# Patient Record
Sex: MALE | Race: WHITE | Employment: FULL TIME | URBAN - METROPOLITAN AREA
[De-identification: names, ages, dates, MRNs, and addresses within clinical notes are randomized per-mention and may not be internally consistent; named-entity substitution may affect disease eponyms.]

---

## 2017-01-19 ENCOUNTER — GENERIC CONVERSION - ENCOUNTER (OUTPATIENT)
Dept: OTHER | Facility: OTHER | Age: 62
End: 2017-01-19

## 2017-06-20 ENCOUNTER — ALLSCRIPTS OFFICE VISIT (OUTPATIENT)
Dept: OTHER | Facility: OTHER | Age: 62
End: 2017-06-20

## 2017-06-20 DIAGNOSIS — I10 ESSENTIAL (PRIMARY) HYPERTENSION: ICD-10-CM

## 2017-09-08 ENCOUNTER — GENERIC CONVERSION - ENCOUNTER (OUTPATIENT)
Dept: OTHER | Facility: OTHER | Age: 62
End: 2017-09-08

## 2017-11-06 ENCOUNTER — GENERIC CONVERSION - ENCOUNTER (OUTPATIENT)
Dept: OTHER | Facility: OTHER | Age: 62
End: 2017-11-06

## 2017-11-06 LAB
A/G RATIO (HISTORICAL): 2 (ref 1.2–2.2)
ALBUMIN SERPL BCP-MCNC: 4.3 G/DL (ref 3.6–4.8)
ALP SERPL-CCNC: 45 IU/L (ref 39–117)
ALT SERPL W P-5'-P-CCNC: 27 IU/L (ref 0–44)
AST SERPL W P-5'-P-CCNC: 22 IU/L (ref 0–40)
BASOPHILS # BLD AUTO: 0 %
BASOPHILS # BLD AUTO: 0 X10E3/UL (ref 0–0.2)
BILIRUB SERPL-MCNC: 0.2 MG/DL (ref 0–1.2)
BUN SERPL-MCNC: 14 MG/DL (ref 8–27)
BUN/CREA RATIO (HISTORICAL): 16 (ref 10–24)
CALCIUM SERPL-MCNC: 8.8 MG/DL (ref 8.6–10.2)
CHLORIDE SERPL-SCNC: 101 MMOL/L (ref 96–106)
CO2 SERPL-SCNC: 25 MMOL/L (ref 18–29)
CREAT SERPL-MCNC: 0.86 MG/DL (ref 0.76–1.27)
DEPRECATED RDW RBC AUTO: 13.7 % (ref 12.3–15.4)
EGFR AFRICAN AMERICAN (HISTORICAL): 107 ML/MIN/1.73
EGFR-AMERICAN CALC (HISTORICAL): 92 ML/MIN/1.73
EOSINOPHIL # BLD AUTO: 0.1 X10E3/UL (ref 0–0.4)
EOSINOPHIL # BLD AUTO: 2 %
GLUCOSE SERPL-MCNC: 104 MG/DL (ref 65–99)
HCT VFR BLD AUTO: 42.6 % (ref 37.5–51)
HGB BLD-MCNC: 14.5 G/DL (ref 12.6–17.7)
IMM.GRANULOCYTES (CD4/8) (HISTORICAL): 0 %
IMM.GRANULOCYTES (CD4/8) (HISTORICAL): 0 X10E3/UL (ref 0–0.1)
LYMPHOCYTES # BLD AUTO: 2.3 X10E3/UL (ref 0.7–3.1)
LYMPHOCYTES # BLD AUTO: 36 %
MCH RBC QN AUTO: 31.9 PG (ref 26.6–33)
MCHC RBC AUTO-ENTMCNC: 34 G/DL (ref 31.5–35.7)
MCV RBC AUTO: 94 FL (ref 79–97)
MONOCYTES # BLD AUTO: 0.7 X10E3/UL (ref 0.1–0.9)
MONOCYTES (HISTORICAL): 11 %
NEUTROPHILS # BLD AUTO: 3.2 X10E3/UL (ref 1.4–7)
NEUTROPHILS # BLD AUTO: 51 %
PLATELET # BLD AUTO: 234 X10E3/UL (ref 150–379)
POTASSIUM SERPL-SCNC: 4.8 MMOL/L (ref 3.5–5.2)
RBC (HISTORICAL): 4.54 X10E6/UL (ref 4.14–5.8)
SODIUM SERPL-SCNC: 141 MMOL/L (ref 134–144)
TOT. GLOBULIN, SERUM (HISTORICAL): 2.1 G/DL (ref 1.5–4.5)
TOTAL PROTEIN (HISTORICAL): 6.4 G/DL (ref 6–8.5)
WBC # BLD AUTO: 6.3 X10E3/UL (ref 3.4–10.8)

## 2017-11-07 LAB
BACTERIA UR QL AUTO: NORMAL
BILIRUB UR QL STRIP: NEGATIVE
CHOLEST SERPL-MCNC: 224 MG/DL (ref 100–199)
CHOLEST/HDLC SERPL: 6.4 RATIO UNITS (ref 0–5)
COLOR UR: YELLOW
COMMENT (HISTORICAL): CLEAR
FECAL OCCULT BLOOD DIAGNOSTIC (HISTORICAL): NEGATIVE
GLUCOSE (HISTORICAL): NEGATIVE
HDLC SERPL-MCNC: 35 MG/DL
KETONES UR STRIP-MCNC: NEGATIVE MG/DL
LDLC SERPL CALC-MCNC: 161 MG/DL (ref 0–99)
LEUKOCYTE ESTERASE UR QL STRIP: NEGATIVE
MICROSCOPIC EXAMINATION (HISTORICAL): NORMAL
MICROSCOPIC EXAMINATION (HISTORICAL): NORMAL
MUCUS THREADS (HISTORICAL): PRESENT
NITRITE UR QL STRIP: NEGATIVE
NON-SQ EPI CELLS URNS QL MICRO: NORMAL /HPF
PH UR STRIP.AUTO: 5 [PH] (ref 5–7.5)
PROT UR STRIP-MCNC: NEGATIVE MG/DL
RBC (HISTORICAL): NORMAL /HPF
SP GR UR STRIP.AUTO: 1.02 (ref 1–1.03)
TRIGL SERPL-MCNC: 140 MG/DL (ref 0–149)
TSH SERPL DL<=0.05 MIU/L-ACNC: 1.21 UIU/ML (ref 0.45–4.5)
URINALYSIS (UA) (HISTORICAL): NORMAL
UROBILINOGEN UR QL STRIP.AUTO: 0.2 EU/DL (ref 0.2–1)
VLDLC SERPL CALC-MCNC: 28 MG/DL (ref 5–40)
WBC # BLD AUTO: NORMAL /HPF

## 2017-12-11 ENCOUNTER — ALLSCRIPTS OFFICE VISIT (OUTPATIENT)
Dept: OTHER | Facility: OTHER | Age: 62
End: 2017-12-11

## 2017-12-12 NOTE — PROGRESS NOTES
Assessment    1  Essential hypertension (401 9) (I10)    Plan  Essential hypertension    · Lisinopril 40 MG Oral Tablet; TAKE 1 TABLET DAILY   · (1) COMPREHENSIVE METABOLIC PANEL; Status:Active; Requested JEP:[id number];    · (1) LDL,DIRECT; Status:Active; Requested ANI:[id number];    · (1) LIPID PANEL, FASTING; Status:Active; Requested [id number]:[id number];    · Follow-up visit in 6 months Evaluation and Treatment  Follow-up  Status: Complete Done: [id number]    Discussion/Summary    The patient's blood pressure stable on his current medication  We have made no changes today  His cholesterol was elevated and he admits to not following his diet  He is going to work on cutting back on the saturated fats in decreasing his portion sizes as well as eating more fruits and vegetables  We will plan on seeing him back as scheduled in 6 months and he will get the lab work done prior to that visit  We will see him back as scheduled  Possible side effects of new medications were reviewed with the patient/guardian today  The treatment plan was reviewed with the patient/guardian  The patient/guardian understands and agrees with the treatment plan      Chief Complaint  Check up Hypertension  Patient is here today for follow up of chronic conditions described in HPI  History of Present Illness  Oliver Ro is 58-year-old male who presents today for follow-up of his hypertension  He is feeling well  The patient denies any chest pain, shortness of breath, or palpitations  There is no edema  There are no headaches or visual changes  There is no lightheadedness, dizziness, or fainting spells  did not take his medication today  are no GI problems  diet has not changed, he is not exercising  is no chest pain or shortness of breath with exertion  The patient presents for follow-up of essential hypertension  The patient states he has been stable with his blood pressure control since the last visit  He has no comorbid illnesses   He has no significant interval events  Symptoms: denies impaired vision,-- denies dyspnea,-- denies chest pain,-- denies intermittent leg claudication,-- denies lower extremity edema-- and-- denies   Associated symptoms include no headache,-- no focal neurologic deficits-- and-- no memory loss  Home monitoring: The patient checks his blood pressure regularly  Blood pressure control has been good  Medications: the patient is adherent with his medication regimen  -- He denies medication side effects  Review of Systems   Constitutional: No fever or chills, feels well, no tiredness, no recent weight gain or weight loss  Eyes: No complaints of eye pain, no red eyes, no discharge from eyes, no itchy eyes  ENT: no complaints of earache, no hearing loss, no nosebleeds, no nasal discharge, no sore throat, no hoarseness  Cardiovascular: No complaints of slow heart rate, no fast heart rate, no chest pain, no palpitations, no leg claudication, no lower extremity  Respiratory: No complaints of shortness of breath, no wheezing, no cough, no SOB on exertion, no orthopnea or PND  Gastrointestinal: No complaints of abdominal pain, no constipation, no nausea or vomiting, no diarrhea or bloody stools  Genitourinary: No complaints of dysuria, no incontinence, no hesitancy, no nocturia, no genital lesion, no testicular pain  Musculoskeletal: No complaints of arthralgia, no myalgias, no joint swelling or stiffness, no limb pain or swelling  Integumentary: No complaints of skin rash or skin lesions, no itching, no skin wound, no dry skin  Neurological: No compliants of headache, no confusion, no convulsions, no numbness or tingling, no dizziness or fainting, no limb weakness, no difficulty walking  Psychiatric: Is not suicidal, no sleep disturbances, no anxiety or depression, no change in personality, no emotional problems    Endocrine: No complaints of proptosis, no hot flashes, no muscle weakness, no erectile dysfunction, no deepening of the voice, no feelings of weakness  Hematologic/Lymphatic: No complaints of swollen glands, no swollen glands in the neck, does not bleed easily, no easy bruising  Active Problems  1  Benign localized hyperplasia of prostate (600 20) (N40 0)   2  Essential hypertension (401 9) (I10)   3  Hyperlipidemia, unspecified (272 4) (E78 5)   4  Nicotine dependence (305 1) (F17 200)   5  Nummular dermatitis (692 9) (L30 0)    Past Medical History  1  History of Colon cancer screening (V76 51) (Z12 11)   2  History of Flu vaccine need (V04 81) (Z23)   3  History of Need for Tdap vaccination (V06 1) (Z23)   4  History of Screening for prostate cancer (V76 44) (Z12 5)   5  History of Vaccine for VZV (varicella-zoster virus) (V05 4) (Z23)    The active problems and past medical history were reviewed and updated today  Family History  Mother    1  Family history of hypertension (V17 49) (Z82 49)  Father    2  Family history of diabetes mellitus (V18 0) (Z83 3)   3  Family history of Parkinsonism (V17 2) (Z82 0)   4  No family history of mental disorder  Sister    5  Family history of CHD (coronary heart disease)   6  Family history of myocardial infarction (V17 3) (Z82 49)    The family history was reviewed and updated today  Social History     · Cigar smoker (305 1) (F17 290)   · History of Current Every Day Smoker (305 1)   · Denies alcohol use causing problems   · Marital History - Currently    · Sedentary Lifestyle (V69 0)   · Working Full Time  The social history was reviewed and updated today  The social history was reviewed and is unchanged  Current Meds   1  Lisinopril 40 MG Oral Tablet; TAKE 1 TABLET DAILY; Therapy: [id number] to (Evaluate:13Jan2018)  Requested for: 75NUT8855; Last Rx:[id number] Ordered    The medication list was reviewed and updated today  Allergies  1   No Known Drug Allergies    Vitals  Vital Signs    Recorded: 58QYK1820 10:45AM   Heart Rate 72   Systolic 140, RUE, Sitting   Diastolic 88, RUE, Sitting   Height 5 ft 7 in   Weight 179 lb    BMI Calculated 28 04   BSA Calculated 1 93       Physical Exam   Constitutional  General appearance: No acute distress, well appearing and well nourished  Eyes  Conjunctiva and lids: No swelling, erythema, or discharge  Pupils and irises: Equal, round and reactive to light  Ears, Nose, Mouth, and Throat  External inspection of ears and nose: Normal    Otoscopic examination: Tympanic membrance translucent with normal light reflex  Canals patent without erythema  Nasal mucosa, septum, and turbinates: Normal without edema or erythema  Oropharynx: Normal with no erythema, edema, exudate or lesions  Pulmonary  Respiratory effort: No increased work of breathing or signs of respiratory distress  Auscultation of lungs: Clear to auscultation, equal breath sounds bilaterally, no wheezes, no rales, no rhonci  Cardiovascular  Palpation of heart: Normal PMI, no thrills  Auscultation of heart: Normal rate and rhythm, normal S1 and S2, without murmurs  Examination of extremities for edema and/or varicosities: Normal    Carotid pulses: Normal    Abdomen  Abdomen: Non-tender, no masses  Liver and spleen: No hepatomegaly or splenomegaly  Lymphatic  Palpation of lymph nodes in neck: No lymphadenopathy  Musculoskeletal  Gait and station: Normal    Digits and nails: Normal without clubbing or cyanosis  Inspection/palpation of joints, bones, and muscles: Normal    Skin  Skin and subcutaneous tissue: Normal without rashes or lesions  Neurologic  Cranial nerves: Cranial nerves 2-12 intact  Reflexes: 2+ and symmetric  Sensation: No sensory loss     Psychiatric  Orientation to person, place and time: Normal    Mood and affect: Normal          Results/Data  (1) CBC/PLT/DIFF [id number] 09:03AM Mikaelagarrett Morenitaeliseo     Test Name Result Flag Reference   WBC 6 3 x10E3/uL  3 4-10 8   RBC 4 54 x10E6/uL  4 14-5 80   Hemoglobin 14 5 g/dL 12 6-17 7   Hematocrit 42 6 %  37 5-51 0   MCV 94 fL  79-97   MCH 31 9 pg  26 6-33 0   MCHC 34 0 g/dL  31 5-35 7   RDW 13 7 %  12 3-15 4   Platelets 630 T62B1/VS  150-379   Neutrophils 51 %  Not Estab  Lymphs 36 %  Not Estab  Monocytes 11 %  Not Estab  Eos 2 %  Not Estab  Basos 0 %  Not Estab  Neutrophils (Absolute) 3 2 x10E3/uL  1 4-7 0   Lymphs (Absolute) 2 3 x10E3/uL  0 7-3 1   Monocytes(Absolute) 0 7 x10E3/uL  0 1-0 9   Eos (Absolute) 0 1 x10E3/uL  0 0-0 4   Baso (Absolute) 0 0 x10E3/uL  0 0-0 2   Immature Granulocytes 0 %  Not Estab  Immature Grans (Abs) 0 0 x10E3/uL  0 0-0 1     (1) COMPREHENSIVE METABOLIC PANEL [id number] 63:14GM Lurlean Stage     Test Name Result Flag Reference   Glucose, Serum 104 mg/dL H 65-99   BUN 14 mg/dL  8-27   Creatinine, Serum 0 86 mg/dL  0 76-1 27   BUN/Creatinine Ratio 16  10-24   Sodium, Serum 141 mmol/L  134-144   Potassium, Serum 4 8 mmol/L  3 5-5 2   Chloride, Serum 101 mmol/L     Carbon Dioxide, Total 25 mmol/L  18-29   Calcium, Serum 8 8 mg/dL  8 6-10 2   Protein, Total, Serum 6 4 g/dL  6 0-8 5   Albumin, Serum 4 3 g/dL  3 6-4 8   Globulin, Total 2 1 g/dL  1 5-4 5   A/G Ratio 2 0  1 2-2 2   Bilirubin, Total 0 2 mg/dL  0 0-1 2   Alkaline Phosphatase, S 45 IU/L     AST (SGOT) 22 IU/L  0-40   ALT (SGPT) 27 IU/L  0-44   eGFR If NonAfricn Am 92 mL/min/1 73  >59   eGFR If Africn Am 107 mL/min/1 73  >59     (1) LIPID PANEL, FASTING [id number] 09:03AM Lurlean Stage     Test Name Result Flag Reference   Cholesterol, Total 224 mg/dL H 100-199   Triglycerides 140 mg/dL  0-149   HDL Cholesterol 35 mg/dL L >39   VLDL Cholesterol Roni 28 mg/dL  5-40   LDL Cholesterol Calc 161 mg/dL H 0-99   T  Chol/HDL Ratio 6 4 ratio units H 0 0-5 0     T  Chol/HDL Ratio                                                            Men  Women                                              1/2 Avg  Risk  3 4    3 3                                                  Avg Risk  5 0    4 4 2X Avg  Risk  9 6    7 1                                               3X Avg  Risk 23 4   11 0     (1) TSH [id number] 09:03AM Mao Pitts     Test Name Result Flag Reference   TSH 1 210 uIU/mL  0 450-4 500       Future Appointments    Date/Time Provider Specialty Site   06/11/2018 09:30 AM Jim Bingham DO Family Medicine [facility], [place], operated by [facility]       Signatures   Electronically signed by : Solange Wright DO; Dec 11 2017  4:55PM EST                       (Author)

## 2018-01-12 VITALS
BODY MASS INDEX: 27.31 KG/M2 | SYSTOLIC BLOOD PRESSURE: 132 MMHG | HEART RATE: 70 BPM | WEIGHT: 174 LBS | HEIGHT: 67 IN | DIASTOLIC BLOOD PRESSURE: 78 MMHG

## 2018-01-22 VITALS
SYSTOLIC BLOOD PRESSURE: 140 MMHG | DIASTOLIC BLOOD PRESSURE: 88 MMHG | WEIGHT: 179 LBS | BODY MASS INDEX: 28.09 KG/M2 | HEART RATE: 72 BPM | HEIGHT: 67 IN

## 2018-06-02 LAB
ALBUMIN SERPL-MCNC: 4.1 G/DL (ref 3.6–4.8)
ALBUMIN/GLOB SERPL: 2.1 {RATIO} (ref 1.2–2.2)
ALP SERPL-CCNC: 42 IU/L (ref 39–117)
ALT SERPL-CCNC: 39 IU/L (ref 0–44)
AST SERPL-CCNC: 41 IU/L (ref 0–40)
BILIRUB SERPL-MCNC: <0.2 MG/DL (ref 0–1.2)
BUN SERPL-MCNC: 15 MG/DL (ref 8–27)
BUN/CREAT SERPL: 19 (ref 10–24)
CALCIUM SERPL-MCNC: 8.7 MG/DL (ref 8.6–10.2)
CHLORIDE SERPL-SCNC: 100 MMOL/L (ref 96–106)
CHOLEST SERPL-MCNC: 157 MG/DL (ref 100–199)
CHOLEST/HDLC SERPL: 4.2 RATIO (ref 0–5)
CO2 SERPL-SCNC: 24 MMOL/L (ref 18–29)
CREAT SERPL-MCNC: 0.79 MG/DL (ref 0.76–1.27)
GLOBULIN SER-MCNC: 2 G/DL (ref 1.5–4.5)
GLUCOSE SERPL-MCNC: 109 MG/DL (ref 65–99)
HDLC SERPL-MCNC: 37 MG/DL
LDLC SERPL CALC-MCNC: 99 MG/DL (ref 0–99)
LDLC SERPL DIRECT ASSAY-MCNC: 98 MG/DL (ref 0–99)
POTASSIUM SERPL-SCNC: 4.6 MMOL/L (ref 3.5–5.2)
PROT SERPL-MCNC: 6.1 G/DL (ref 6–8.5)
SL AMB EGFR AFRICAN AMERICAN: 110 ML/MIN/1.73
SL AMB EGFR NON AFRICAN AMERICAN: 95 ML/MIN/1.73
SL AMB VLDL CHOLESTEROL CALC: 21 MG/DL (ref 5–40)
SODIUM SERPL-SCNC: 139 MMOL/L (ref 134–144)
TRIGL SERPL-MCNC: 103 MG/DL (ref 0–149)

## 2018-06-08 RX ORDER — LISINOPRIL 40 MG/1
TABLET ORAL
Qty: 90 TABLET | Refills: 1 | OUTPATIENT
Start: 2018-06-08

## 2018-06-11 ENCOUNTER — OFFICE VISIT (OUTPATIENT)
Dept: FAMILY MEDICINE CLINIC | Facility: CLINIC | Age: 63
End: 2018-06-11
Payer: COMMERCIAL

## 2018-06-11 VITALS
HEIGHT: 67 IN | DIASTOLIC BLOOD PRESSURE: 84 MMHG | HEART RATE: 64 BPM | BODY MASS INDEX: 28.56 KG/M2 | SYSTOLIC BLOOD PRESSURE: 140 MMHG | WEIGHT: 182 LBS

## 2018-06-11 DIAGNOSIS — E78.2 MIXED HYPERLIPIDEMIA: ICD-10-CM

## 2018-06-11 DIAGNOSIS — F17.290 OTHER TOBACCO PRODUCT NICOTINE DEPENDENCE, UNCOMPLICATED: ICD-10-CM

## 2018-06-11 DIAGNOSIS — I10 ESSENTIAL HYPERTENSION: Primary | ICD-10-CM

## 2018-06-11 PROCEDURE — 99213 OFFICE O/P EST LOW 20 MIN: CPT | Performed by: FAMILY MEDICINE

## 2018-06-11 PROCEDURE — 3008F BODY MASS INDEX DOCD: CPT | Performed by: FAMILY MEDICINE

## 2018-06-11 RX ORDER — LISINOPRIL 40 MG/1
1 TABLET ORAL DAILY
COMMUNITY
Start: 2011-09-28 | End: 2018-07-13 | Stop reason: SDUPTHER

## 2018-06-11 NOTE — PATIENT INSTRUCTIONS

## 2018-06-11 NOTE — PROGRESS NOTES
Assessment/Plan:  Essential hypertension  Hypertension-the patient will continue with his current medication  He does have some element of white coat syndrome  He is getting lower blood pressure readings at home  He will continue to monitor it and will purchase a blood pressure cuff  He will call with readings consistently greater than 140/90  He was again encouraged to quit smoking  We will increase his exercise  We will see him back in the office as scheduled  Hyperlipidemia-patient's lipids look good on his current lab work  He will continue with his diet and exercise  All of his other labs look normal      Diagnoses and all orders for this visit:    Essential hypertension    Mixed hyperlipidemia    Other tobacco product nicotine dependence, uncomplicated    Other orders  -     lisinopril (ZESTRIL) 40 mg tablet; Take 1 tablet by mouth daily       Return in about 3 months (around 9/11/2018)  Subjective:   Chief Complaint   Patient presents with    Follow-up     6 month checkup        Patient ID: Tamara Spring is a 57 y o  male presents today for follow-up of his hypertension  Tamara Spring is a 57 y o  male who presents today for checkup of his hypertension  He is not checking his BP  The patient denies any chest pain, shortness of breath, or palpitations  There is no edema  There are no headaches or visual changes  There is no lightheadedness, dizziness, or fainting spells  There are no headaches  He is watching his diet  There are no GI problems  He is watching his diet  Hypertension   This is a chronic problem  The current episode started more than 1 year ago  The problem is unchanged  The problem is controlled  Pertinent negatives include no anxiety, blurred vision, chest pain, headaches, malaise/fatigue, neck pain, orthopnea, palpitations, peripheral edema, PND, shortness of breath or sweats  Risk factors for coronary artery disease include dyslipidemia   Past treatments include ACE inhibitors  The current treatment provides moderate improvement  The following portions of the patient's history were reviewed and updated as appropriate: allergies, current medications, past family history, past medical history, past social history, past surgical history and problem list   Patient Active Problem List   Diagnosis    Benign localized hyperplasia of prostate    Essential hypertension    Hyperlipidemia, unspecified    Nicotine dependence    Nummular dermatitis     History reviewed  No pertinent past medical history  History reviewed  No pertinent surgical history  No Known Allergies  Family History   Problem Relation Age of Onset    Hypertension Mother     Diabetes Father     Parkinsonism Father     Heart disease Sister      coronary heart disease(CHD)    Heart attack Sister 62     myocardial infarction     Social History     Social History    Marital status:      Spouse name: N/A    Number of children: N/A    Years of education: N/A     Occupational History    Not on file  Social History Main Topics    Smoking status: Current Every Day Smoker     Types: Cigars    Smokeless tobacco: Never Used    Alcohol use No    Drug use: Unknown    Sexual activity: Not on file     Other Topics Concern    Not on file     Social History Narrative    Sendentary lifestyle    , per [employer]    Working full-time         No current outpatient prescriptions on file prior to visit  No current facility-administered medications on file prior to visit  Review of Systems   Constitutional: Negative  Negative for malaise/fatigue  HENT: Negative  Eyes: Negative  Negative for blurred vision  Respiratory: Negative  Negative for shortness of breath  Cardiovascular: Negative  Negative for chest pain, palpitations, orthopnea and PND  Gastrointestinal: Negative  Endocrine: Negative  Genitourinary: Negative  Musculoskeletal: Negative    Negative for neck pain  Skin: Negative  Allergic/Immunologic: Negative  Neurological: Negative  Negative for headaches  Hematological: Negative  Psychiatric/Behavioral: Negative  Objective:  Vitals:    06/11/18 0928 06/11/18 1000   BP: 146/80 140/84   BP Location: Right arm    Patient Position: Sitting    Cuff Size: Standard    Pulse: 66 64   Weight: 82 6 kg (182 lb)    Height: 5' 7" (1 702 m)      Body mass index is 28 51 kg/m²  Wt Readings from Last 3 Encounters:   06/11/18 82 6 kg (182 lb)   12/11/17 81 2 kg (179 lb)   06/20/17 78 9 kg (174 lb)     Temp Readings from Last 3 Encounters:   12/12/16 98 °F (36 7 °C) (Tympanic)   06/01/16 98 5 °F (36 9 °C) (Tympanic)   06/01/15 98 5 °F (36 9 °C) (Tympanic)     BP Readings from Last 3 Encounters:   06/11/18 140/84   12/11/17 140/88   06/20/17 132/78     Pulse Readings from Last 3 Encounters:   06/11/18 64   12/11/17 72   06/20/17 70        Physical Exam   Constitutional: He is oriented to person, place, and time  He appears well-developed and well-nourished  No distress  Eyes: EOM are normal  Pupils are equal, round, and reactive to light  Neck: Normal range of motion  Neck supple  No JVD present  No tracheal deviation present  No thyromegaly present  Cardiovascular: Normal rate, regular rhythm and normal heart sounds  Exam reveals no gallop and no friction rub  No murmur heard  Pulmonary/Chest: Effort normal and breath sounds normal  No stridor  No respiratory distress  He has no wheezes  He has no rales  He exhibits no tenderness  Abdominal: Soft  Bowel sounds are normal  He exhibits no distension and no mass  There is no tenderness  There is no rebound and no guarding  Musculoskeletal: Normal range of motion  Lymphadenopathy:     He has no cervical adenopathy  Neurological: He is alert and oriented to person, place, and time  He has normal reflexes  No cranial nerve deficit  He exhibits normal muscle tone   Coordination normal    Skin: He is not diaphoretic  Nursing note and vitals reviewed  Orders Only on 06/01/2018   Component Date Value    SL AMB GLUCOSE 06/01/2018 109*    BUN 06/01/2018 15     Creatinine, Serum 06/01/2018 0 79     eGFR Non African American 06/01/2018 95     SL AMB EGFR  AMER* 06/01/2018 110     SL AMB BUN/CREATININE RA* 06/01/2018 19     SL AMB SODIUM 06/01/2018 139     SL AMB POTASSIUM 06/01/2018 4 6     SL AMB CHLORIDE 06/01/2018 100     SL AMB CARBON DIOXIDE 06/01/2018 24     CALCIUM 06/01/2018 8 7     SL AMB PROTEIN, TOTAL 06/01/2018 6 1     Serum Albumin 06/01/2018 4 1     Globulin, Total 06/01/2018 2 0     SL AMB ALBUMIN/GLOBULIN * 06/01/2018 2 1     SL AMB BILIRUBIN, TOTAL 06/01/2018 <0 2     Alk Phos Isoenzymes 06/01/2018 42     SL AMB AST 06/01/2018 41*    SL AMB ALT 06/01/2018 39     Cholesterol, Total 06/01/2018 157     Triglycerides 06/01/2018 103     SL AMB HDL CHOLESTEROL 06/01/2018 37*    SL AMB VLDL CHOLESTEROL * 06/01/2018 21     SL AMB LDL-CHOLESTEROL 06/01/2018 99     T   Chol/HDL Ratio 06/01/2018 4 2     LDL Cholesterol 06/01/2018 98

## 2018-06-12 NOTE — ASSESSMENT & PLAN NOTE
Hypertension-the patient will continue with his current medication  He does have some element of white coat syndrome  He is getting lower blood pressure readings at home  He will continue to monitor it and will purchase a blood pressure cuff  He will call with readings consistently greater than 140/90  He was again encouraged to quit smoking  We will increase his exercise  We will see him back in the office as scheduled

## 2018-07-11 ENCOUNTER — TELEPHONE (OUTPATIENT)
Dept: FAMILY MEDICINE CLINIC | Facility: CLINIC | Age: 63
End: 2018-07-11

## 2018-07-11 NOTE — TELEPHONE ENCOUNTER
You saw Sade Paiges 6/11 and asked him to keep an eye on his blood pressure and he is just calling to let you know that it has stayed pretty much the same 152/78 high and 117/62 low    Is there anything you want him to do?      [phone]

## 2018-07-13 DIAGNOSIS — I10 ESSENTIAL HYPERTENSION: Primary | ICD-10-CM

## 2018-07-13 RX ORDER — LISINOPRIL 40 MG/1
40 TABLET ORAL DAILY
Qty: 90 TABLET | Refills: 1 | Status: SHIPPED | OUTPATIENT
Start: 2018-07-13 | End: 2019-01-08 | Stop reason: SDUPTHER

## 2018-07-13 NOTE — TELEPHONE ENCOUNTER
I tried calling the patient on 7/11/2018 and 7/13/2018 to respond to his question  I was connected with his voicemail both times and left a message for him to the call the office

## 2018-09-14 ENCOUNTER — OFFICE VISIT (OUTPATIENT)
Dept: FAMILY MEDICINE CLINIC | Facility: CLINIC | Age: 63
End: 2018-09-14
Payer: COMMERCIAL

## 2018-09-14 VITALS
DIASTOLIC BLOOD PRESSURE: 88 MMHG | SYSTOLIC BLOOD PRESSURE: 140 MMHG | HEART RATE: 64 BPM | WEIGHT: 179.6 LBS | BODY MASS INDEX: 28.13 KG/M2 | TEMPERATURE: 97.4 F

## 2018-09-14 DIAGNOSIS — I10 ESSENTIAL HYPERTENSION: Primary | ICD-10-CM

## 2018-09-14 PROCEDURE — 99213 OFFICE O/P EST LOW 20 MIN: CPT | Performed by: FAMILY MEDICINE

## 2018-09-14 RX ORDER — AMLODIPINE BESYLATE 2.5 MG/1
2.5 TABLET ORAL DAILY
Qty: 30 TABLET | Refills: 5 | Status: SHIPPED | OUTPATIENT
Start: 2018-09-14 | End: 2018-11-08 | Stop reason: SDUPTHER

## 2018-09-14 NOTE — PROGRESS NOTES
Assessment/Plan:  Essential hypertension  The patient's blood pressure is not fully controlled with his current dose of the lisinopril  We are going add on the amlodipine 2 5 mg daily to help stabilize his blood pressure  He will continue to monitor his readings at home and will call us with readings consistently greater than 140/90  We will see him back in the office as scheduled  Diagnoses and all orders for this visit:    Essential hypertension  -     amLODIPine (NORVASC) 2 5 mg tablet; Take 1 tablet (2 5 mg total) by mouth daily       Return in about 3 months (around 12/14/2018) for Recheck  Subjective:   Chief Complaint   Patient presents with    Follow-up     Follow up Hypertension        Patient ID: Elina Chan is a 57 y o  male presents today for a routine checkup of his hypertension  Winifred Caballero is a 57 y o  Male who presents today for a checkup of his hypertension  He was seen by Dr Chyna Urena this week and his reading was 130/80/  He is getting fluctuating readings in the 130-160/70 range at home  The patient denies any chest pain, shortness of breath, or palpitations  There is no edema  There are no headaches or visual changes  There is no lightheadedness, dizziness, or fainting spells  There are no palpitations  He does not need a refill  Hypertension   This is a chronic problem  The current episode started more than 1 year ago  The problem has been waxing and waning since onset  The problem is controlled  Pertinent negatives include no anxiety, blurred vision, chest pain, headaches, malaise/fatigue, neck pain, orthopnea, palpitations, peripheral edema, PND, shortness of breath or sweats  Past treatments include ACE inhibitors  The current treatment provides significant improvement  There are no compliance problems        The following portions of the patient's history were reviewed and updated as appropriate: allergies, current medications, past family history, past medical history, past social history, past surgical history and problem list   Patient Active Problem List   Diagnosis    Benign localized hyperplasia of prostate    Essential hypertension    Hyperlipidemia, unspecified    Nicotine dependence    Nummular dermatitis     History reviewed  No pertinent past medical history  History reviewed  No pertinent surgical history  No Known Allergies  Family History   Problem Relation Age of Onset    Hypertension Mother     Diabetes Father     Parkinsonism Father     Heart disease Sister         coronary heart disease(CHD)    Heart attack Sister 62        myocardial infarction     Social History     Social History    Marital status:      Spouse name: N/A    Number of children: N/A    Years of education: N/A     Occupational History    Not on file  Social History Main Topics    Smoking status: Current Every Day Smoker     Types: Cigars    Smokeless tobacco: Never Used    Alcohol use No    Drug use: Unknown    Sexual activity: Not on file     Other Topics Concern    Not on file     Social History Narrative    Sendentary lifestyle    , per [employer]    Working full-time         Current Outpatient Prescriptions on File Prior to Visit   Medication Sig Dispense Refill    lisinopril (ZESTRIL) 40 mg tablet Take 1 tablet (40 mg total) by mouth daily 90 tablet 1     No current facility-administered medications on file prior to visit  Review of Systems   Constitutional: Negative  Negative for malaise/fatigue  HENT: Negative  Eyes: Negative  Negative for blurred vision  Respiratory: Negative  Negative for shortness of breath  Cardiovascular: Negative  Negative for chest pain, palpitations, orthopnea and PND  Gastrointestinal: Negative  Endocrine: Negative  Genitourinary: Negative  Musculoskeletal: Negative  Negative for neck pain  Skin: Negative  Allergic/Immunologic: Negative  Neurological: Negative  Negative for headaches  Hematological: Negative  Psychiatric/Behavioral: Negative  Objective:  Vitals:    09/14/18 0828   BP: 140/88   BP Location: Right arm   Patient Position: Sitting   Cuff Size: Standard   Pulse: 64   Temp: (!) 97 4 °F (36 3 °C)   TempSrc: Tympanic   Weight: 81 5 kg (179 lb 9 6 oz)     Body mass index is 28 13 kg/m²  Wt Readings from Last 3 Encounters:   09/14/18 81 5 kg (179 lb 9 6 oz)   06/11/18 82 6 kg (182 lb)   12/11/17 81 2 kg (179 lb)     Temp Readings from Last 3 Encounters:   09/14/18 (!) 97 4 °F (36 3 °C) (Tympanic)   12/12/16 98 °F (36 7 °C) (Tympanic)   06/01/16 98 5 °F (36 9 °C) (Tympanic)     BP Readings from Last 3 Encounters:   09/14/18 140/88   06/11/18 140/84   12/11/17 140/88     Pulse Readings from Last 3 Encounters:   09/14/18 64   06/11/18 64   12/11/17 72        Physical Exam   Constitutional: He is oriented to person, place, and time  He appears well-developed and well-nourished  No distress  Eyes: EOM are normal  Pupils are equal, round, and reactive to light  Neck: Normal range of motion  Neck supple  No JVD present  No tracheal deviation present  No thyromegaly present  Cardiovascular: Normal rate, regular rhythm, S1 normal, S2 normal, normal heart sounds, intact distal pulses and normal pulses  Exam reveals no gallop and no friction rub  No murmur heard  Pulmonary/Chest: Effort normal and breath sounds normal  No stridor  No respiratory distress  He has no wheezes  He has no rales  He exhibits no tenderness  Abdominal: Soft  Bowel sounds are normal  He exhibits no distension and no mass  There is no tenderness  There is no rebound and no guarding  Musculoskeletal: Normal range of motion  Lymphadenopathy:     He has no cervical adenopathy  Neurological: He is alert and oriented to person, place, and time  He has normal reflexes  No cranial nerve deficit  He exhibits normal muscle tone  Coordination normal    Skin: He is not diaphoretic  Nursing note and vitals reviewed  No visits with results within 2 Month(s) from this visit  Latest known visit with results is:   Orders Only on 06/01/2018   Component Date Value    Glucose 06/01/2018 109*    BUN 06/01/2018 15     Creatinine 06/01/2018 0 79     eGFR Non African American 06/01/2018 95     SL AMB EGFR  AMER* 06/01/2018 110     SL AMB BUN/CREATININE RA* 06/01/2018 19     Sodium 06/01/2018 139     SL AMB POTASSIUM 06/01/2018 4 6     Chloride 06/01/2018 100     CO2 06/01/2018 24     CALCIUM 06/01/2018 8 7     SL AMB PROTEIN, TOTAL 06/01/2018 6 1     Albumin 06/01/2018 4 1     Globulin, Total 06/01/2018 2 0     SL AMB ALBUMIN/GLOBULIN * 06/01/2018 2 1     TOTAL BILIRUBIN 06/01/2018 <0 2     Alk Phos Isoenzymes 06/01/2018 42     SL AMB AST 06/01/2018 41*    SL AMB ALT 06/01/2018 39     Cholesterol, Total 06/01/2018 157     Triglycerides 06/01/2018 103     HDL 06/01/2018 37*    SL AMB VLDL CHOLESTEROL * 06/01/2018 21     LDL Direct 06/01/2018 99     T   Chol/HDL Ratio 06/01/2018 4 2     LDL Cholesterol 06/01/2018 98

## 2018-09-15 NOTE — ASSESSMENT & PLAN NOTE
The patient's blood pressure is not fully controlled with his current dose of the lisinopril  We are going add on the amlodipine 2 5 mg daily to help stabilize his blood pressure  He will continue to monitor his readings at home and will call us with readings consistently greater than 140/90  We will see him back in the office as scheduled

## 2018-11-08 ENCOUNTER — TELEPHONE (OUTPATIENT)
Dept: FAMILY MEDICINE CLINIC | Facility: CLINIC | Age: 63
End: 2018-11-08

## 2018-11-08 DIAGNOSIS — I10 ESSENTIAL HYPERTENSION: ICD-10-CM

## 2018-11-08 RX ORDER — AMLODIPINE BESYLATE 2.5 MG/1
2.5 TABLET ORAL DAILY
Qty: 90 TABLET | Refills: 1 | Status: SHIPPED | OUTPATIENT
Start: 2018-11-08 | End: 2018-12-13 | Stop reason: SDUPTHER

## 2018-11-08 NOTE — TELEPHONE ENCOUNTER
PT NEEDS AMLODIPINE 2 5MG SEND TO [facility] PT NEEDS 90 DAYS WITH REFILLS

## 2018-12-13 ENCOUNTER — OFFICE VISIT (OUTPATIENT)
Dept: FAMILY MEDICINE CLINIC | Facility: CLINIC | Age: 63
End: 2018-12-13
Payer: COMMERCIAL

## 2018-12-13 VITALS
BODY MASS INDEX: 28.72 KG/M2 | TEMPERATURE: 97.9 F | HEART RATE: 56 BPM | SYSTOLIC BLOOD PRESSURE: 140 MMHG | HEIGHT: 67 IN | WEIGHT: 183 LBS | DIASTOLIC BLOOD PRESSURE: 84 MMHG

## 2018-12-13 DIAGNOSIS — I10 ESSENTIAL HYPERTENSION: Primary | ICD-10-CM

## 2018-12-13 PROCEDURE — 3008F BODY MASS INDEX DOCD: CPT | Performed by: FAMILY MEDICINE

## 2018-12-13 PROCEDURE — 99213 OFFICE O/P EST LOW 20 MIN: CPT | Performed by: FAMILY MEDICINE

## 2018-12-13 RX ORDER — AMLODIPINE BESYLATE 2.5 MG/1
5 TABLET ORAL DAILY
Qty: 180 TABLET | Refills: 0 | OUTPATIENT
Start: 2018-12-13 | End: 2019-01-14 | Stop reason: SDUPTHER

## 2018-12-13 NOTE — PROGRESS NOTES
Assessment/Plan:  Essential hypertension  The patient's blood pressure is still not fully controlled with his current medication  I am going to have him increase the amlodipine to a total of 5 mg daily-he will take 2 of his 2 5 mg pills daily  He will continue to monitor his blood pressure at home and will call us with his readings in 2 weeks  He will continue with his healthy diet and exercise and will try to lose some weight  We will see him back as scheduled  Diagnoses and all orders for this visit:    Essential hypertension  -     amLODIPine (NORVASC) 2 5 mg tablet; Take 2 tablets (5 mg total) by mouth daily       Return in about 3 months (around 3/13/2019) for Recheck  Subjective:   Chief Complaint   Patient presents with    Follow-up     Check up 3 months Hypertension        Patient ID: Eduin Vu is a 70 y o  male presents today for follow-up of his hypertension  Eduin Vu is a 70 y o  male who presents today for follow-up of his hypertension  At his last visit, we added on amlodipine 2 5 mg to improve his blood pressure control  He is here today for follow-up  His BP at home is in the 130-150/80  The patient denies any chest pain, shortness of breath, or palpitations  There is no edema  There are no headaches or visual changes  There is no lightheadedness, dizziness, or fainting spells  There is no edema  There are no headaches  There are no other problems  There are no GI problems  Hypertension   This is a chronic problem  The current episode started more than 1 year ago  The problem is unchanged  The problem is controlled  Pertinent negatives include no anxiety, blurred vision, chest pain, headaches, malaise/fatigue, neck pain, orthopnea, palpitations, peripheral edema, PND, shortness of breath or sweats  The current treatment provides significant improvement  There are no compliance problems        The following portions of the patient's history were reviewed and updated as appropriate: allergies, current medications, past family history, past medical history, past social history, past surgical history and problem list   Patient Active Problem List   Diagnosis    Benign localized hyperplasia of prostate    Essential hypertension    Hyperlipidemia, unspecified    Nicotine dependence    Nummular dermatitis     History reviewed  No pertinent past medical history  History reviewed  No pertinent surgical history  No Known Allergies  Family History   Problem Relation Age of Onset    Hypertension Mother     Diabetes Father     Parkinsonism Father     Heart disease Sister         coronary heart disease(CHD)    Heart attack Sister 62        myocardial infarction     Social History     Social History    Marital status:      Spouse name: N/A    Number of children: N/A    Years of education: N/A     Occupational History    Not on file  Social History Main Topics    Smoking status: Current Every Day Smoker     Types: Cigars    Smokeless tobacco: Never Used    Alcohol use No    Drug use: Unknown    Sexual activity: Not on file     Other Topics Concern    Not on file     Social History Narrative    Sendentary lifestyle    , per [employer]    Working full-time         Current Outpatient Prescriptions on File Prior to Visit   Medication Sig Dispense Refill    lisinopril (ZESTRIL) 40 mg tablet Take 1 tablet (40 mg total) by mouth daily 90 tablet 1     No current facility-administered medications on file prior to visit  Review of Systems   Constitutional: Negative  Negative for malaise/fatigue  HENT: Negative  Eyes: Negative  Negative for blurred vision  Respiratory: Negative  Negative for shortness of breath  Cardiovascular: Negative  Negative for chest pain, palpitations, orthopnea and PND  Gastrointestinal: Negative  Endocrine: Negative  Genitourinary: Negative  Musculoskeletal: Negative  Negative for neck pain  Skin: Negative  Allergic/Immunologic: Negative  Neurological: Negative  Negative for headaches  Hematological: Negative  Psychiatric/Behavioral: Negative  Objective:  Vitals:    12/13/18 0846   BP: 140/84   BP Location: Right arm   Patient Position: Sitting   Cuff Size: Standard   Pulse: 56   Temp: 97 9 °F (36 6 °C)   TempSrc: Tympanic   Weight: 83 kg (183 lb)   Height: 5' 7" (1 702 m)     Body mass index is 28 66 kg/m²  Wt Readings from Last 3 Encounters:   12/13/18 83 kg (183 lb)   09/14/18 81 5 kg (179 lb 9 6 oz)   06/11/18 82 6 kg (182 lb)     Temp Readings from Last 3 Encounters:   12/13/18 97 9 °F (36 6 °C) (Tympanic)   09/14/18 (!) 97 4 °F (36 3 °C) (Tympanic)   12/12/16 98 °F (36 7 °C) (Tympanic)     BP Readings from Last 3 Encounters:   12/13/18 140/84   09/14/18 140/88   06/11/18 140/84     Pulse Readings from Last 3 Encounters:   12/13/18 56   09/14/18 64   06/11/18 64        Physical Exam   Constitutional: He is oriented to person, place, and time  He appears well-developed and well-nourished  No distress  Eyes: Pupils are equal, round, and reactive to light  EOM are normal    Neck: Normal range of motion  Neck supple  No JVD present  No tracheal deviation present  No thyromegaly present  Cardiovascular: Normal rate, regular rhythm and normal heart sounds  Exam reveals no gallop and no friction rub  No murmur heard  Pulmonary/Chest: Effort normal and breath sounds normal  No stridor  No respiratory distress  He has no wheezes  He has no rales  He exhibits no tenderness  Abdominal: Soft  Bowel sounds are normal  He exhibits no distension and no mass  There is no tenderness  There is no rebound and no guarding  Musculoskeletal: Normal range of motion  Lymphadenopathy:     He has no cervical adenopathy  Neurological: He is alert and oriented to person, place, and time  He has normal reflexes  No cranial nerve deficit  He exhibits normal muscle tone   Coordination normal    Skin: Skin is warm and dry  No rash noted  He is not diaphoretic  No erythema  No pallor  Nursing note and vitals reviewed  No visits with results within 2 Month(s) from this visit  Latest known visit with results is:   Orders Only on 06/01/2018   Component Date Value    Glucose, Random 06/01/2018 109*    BUN 06/01/2018 15     Creatinine 06/01/2018 0 79     eGFR Non African American 06/01/2018 95     SL AMB EGFR  AMER* 06/01/2018 110     SL AMB BUN/CREATININE RA* 06/01/2018 19     Sodium 06/01/2018 139     Potassium 06/01/2018 4 6     Chloride 06/01/2018 100     CO2 06/01/2018 24     CALCIUM 06/01/2018 8 7     SL AMB PROTEIN, TOTAL 06/01/2018 6 1     Albumin 06/01/2018 4 1     Globulin, Total 06/01/2018 2 0     Albumin/Globulin Ratio 06/01/2018 2 1     TOTAL BILIRUBIN 06/01/2018 <0 2     Alk Phos Isoenzymes 06/01/2018 42     SL AMB AST 06/01/2018 41*    SL AMB ALT 06/01/2018 39     Cholesterol, Total 06/01/2018 157     Triglycerides 06/01/2018 103     HDL 06/01/2018 37*    VLDL Cholesterol Calcula* 06/01/2018 21     LDL Direct 06/01/2018 99     T   Chol/HDL Ratio 06/01/2018 4 2     LDL Cholesterol 06/01/2018 98

## 2018-12-14 NOTE — ASSESSMENT & PLAN NOTE
The patient's blood pressure is still not fully controlled with his current medication  I am going to have him increase the amlodipine to a total of 5 mg daily-he will take 2 of his 2 5 mg pills daily  He will continue to monitor his blood pressure at home and will call us with his readings in 2 weeks  He will continue with his healthy diet and exercise and will try to lose some weight  We will see him back as scheduled

## 2019-01-08 DIAGNOSIS — I10 ESSENTIAL HYPERTENSION: ICD-10-CM

## 2019-01-08 RX ORDER — LISINOPRIL 40 MG/1
TABLET ORAL
Qty: 90 TABLET | Refills: 1 | Status: SHIPPED | OUTPATIENT
Start: 2019-01-08 | End: 2019-08-05 | Stop reason: SDUPTHER

## 2019-01-14 DIAGNOSIS — I10 ESSENTIAL HYPERTENSION: ICD-10-CM

## 2019-01-14 RX ORDER — AMLODIPINE BESYLATE 5 MG/1
5 TABLET ORAL DAILY
Qty: 90 TABLET | Refills: 1 | Status: SHIPPED | OUTPATIENT
Start: 2019-01-14 | End: 2019-07-18 | Stop reason: SDUPTHER

## 2019-03-18 ENCOUNTER — OFFICE VISIT (OUTPATIENT)
Dept: FAMILY MEDICINE CLINIC | Facility: CLINIC | Age: 64
End: 2019-03-18
Payer: COMMERCIAL

## 2019-03-18 VITALS
HEIGHT: 67 IN | SYSTOLIC BLOOD PRESSURE: 134 MMHG | HEART RATE: 64 BPM | WEIGHT: 181 LBS | RESPIRATION RATE: 18 BRPM | DIASTOLIC BLOOD PRESSURE: 80 MMHG | BODY MASS INDEX: 28.41 KG/M2 | TEMPERATURE: 98.6 F

## 2019-03-18 DIAGNOSIS — E78.2 MIXED HYPERLIPIDEMIA: ICD-10-CM

## 2019-03-18 DIAGNOSIS — Z11.59 ENCOUNTER FOR HEPATITIS C SCREENING TEST FOR LOW RISK PATIENT: ICD-10-CM

## 2019-03-18 DIAGNOSIS — F17.290 OTHER TOBACCO PRODUCT NICOTINE DEPENDENCE, UNCOMPLICATED: ICD-10-CM

## 2019-03-18 DIAGNOSIS — I10 ESSENTIAL HYPERTENSION: Primary | ICD-10-CM

## 2019-03-18 DIAGNOSIS — Z23 NEED FOR VACCINATION: ICD-10-CM

## 2019-03-18 DIAGNOSIS — E66.3 OVERWEIGHT (BMI 25.0-29.9): ICD-10-CM

## 2019-03-18 PROCEDURE — 3079F DIAST BP 80-89 MM HG: CPT | Performed by: FAMILY MEDICINE

## 2019-03-18 PROCEDURE — 99214 OFFICE O/P EST MOD 30 MIN: CPT | Performed by: FAMILY MEDICINE

## 2019-03-18 PROCEDURE — 3075F SYST BP GE 130 - 139MM HG: CPT | Performed by: FAMILY MEDICINE

## 2019-03-18 PROCEDURE — 1160F RVW MEDS BY RX/DR IN RCRD: CPT

## 2019-03-18 PROCEDURE — 90471 IMMUNIZATION ADMIN: CPT

## 2019-03-18 PROCEDURE — 3008F BODY MASS INDEX DOCD: CPT | Performed by: FAMILY MEDICINE

## 2019-03-18 PROCEDURE — 90670 PCV13 VACCINE IM: CPT

## 2019-03-18 PROCEDURE — 1101F PT FALLS ASSESS-DOCD LE1/YR: CPT | Performed by: FAMILY MEDICINE

## 2019-03-18 NOTE — ASSESSMENT & PLAN NOTE
The patient will go for the lab work as ordered  He will continue work on improving his diet and exercise  We will see him back as scheduled

## 2019-03-18 NOTE — ASSESSMENT & PLAN NOTE
The patient's blood pressure is reasonably controlled with his current medication  He is getting normal readings in the morning but it is going up after smoking cigars  He is going to work on smoking cessation and that should help to improve his blood pressure  We will continue him on the current medication  He will continue to work on watching his diet and increasing his exercise  He will go for the lab work as ordered and we will follow up with the results  We will see him back in the office as scheduled

## 2019-03-18 NOTE — ASSESSMENT & PLAN NOTE
The patient will go for the lung cancer screening CT as ordered  He was again instructed to quit smoking as follows: You should stop smoking  Smoking increases your risk for cardiovascular disease, lung cancer, and chronic lung disease (COPD)  There are many strategies that can be used including behavioral modification, nicotine replacement, and medications  When you are ready to stop please ask your provider for assistance to develop a personalized plan

## 2019-03-18 NOTE — PROGRESS NOTES
Assessment/Plan:  Essential hypertension  The patient's blood pressure is reasonably controlled with his current medication  He is getting normal readings in the morning but it is going up after smoking cigars  He is going to work on smoking cessation and that should help to improve his blood pressure  We will continue him on the current medication  He will continue to work on watching his diet and increasing his exercise  He will go for the lab work as ordered and we will follow up with the results  We will see him back in the office as scheduled  Hyperlipidemia, unspecified  The patient will go for the lab work as ordered  He will continue work on improving his diet and exercise  We will see him back as scheduled  Nicotine dependence  The patient will go for the lung cancer screening CT as ordered  He was again instructed to quit smoking as follows: You should stop smoking  Smoking increases your risk for cardiovascular disease, lung cancer, and chronic lung disease (COPD)  There are many strategies that can be used including behavioral modification, nicotine replacement, and medications  When you are ready to stop please ask your provider for assistance to develop a personalized plan  Diagnoses and all orders for this visit:    Essential hypertension  -     Comprehensive metabolic panel; Future  -     LDL cholesterol, direct; Future  -     Lipid panel; Future  -     CBC and differential; Future  -     TSH, 3rd generation with Free T4 reflex; Future  -     Urinalysis with reflex to microscopic; Future  -     Comprehensive metabolic panel  -     LDL cholesterol, direct  -     Lipid panel  -     CBC and differential  -     TSH, 3rd generation with Free T4 reflex  -     Urinalysis with reflex to microscopic    Mixed hyperlipidemia  -     Comprehensive metabolic panel; Future  -     LDL cholesterol, direct; Future  -     Lipid panel;  Future  -     CBC and differential; Future  -     TSH, 3rd generation with Free T4 reflex; Future  -     Urinalysis with reflex to microscopic; Future  -     Comprehensive metabolic panel  -     LDL cholesterol, direct  -     Lipid panel  -     CBC and differential  -     TSH, 3rd generation with Free T4 reflex  -     Urinalysis with reflex to microscopic    Other tobacco product nicotine dependence, uncomplicated  -     CT lung screening program; Future    Need for vaccination  -     PNEUMOCOCCAL CONJUGATE VACCINE 13-VALENT GREATER THAN 6 MONTHS    Encounter for hepatitis C screening test for low risk patient  -     Hepatitis C antibody; Future  -     Hepatitis C antibody    Overweight (BMI 25 0-29  9)    Other orders  -     Cancel: PSA, ultrasensitive; Future       Return in about 4 months (around 7/18/2019) for Recheck  Subjective:   Chief Complaint   Patient presents with    Follow-up     3 month checkup        Patient ID: Mal Rosario is a 70 y o  male presents today for a routine checkup  Mal Rosario is a 70 y o  male who presents today for checkup of his hypertension hyperlipidemia  At his last visit, we increased his amlodipine to 5 mg daily to better control his hypertension  He is here today for follow-up  He is checking his BP at home and it is 120/80 in the AM   It will be high during the day up to160/80 at work- after smoking  The patient denies any chest pain, shortness of breath, or palpitations  There is no edema  There are no headaches or visual changes  There is no lightheadedness, dizziness, or fainting spells  There are no headaches or blurry vision  He is feeling well otherwise  He is actively trying to quit smoking and wants to get down on his cigar use  Hypertension   This is a chronic problem  The current episode started more than 1 year ago  The problem is unchanged  The problem is controlled   Pertinent negatives include no anxiety, blurred vision, chest pain, headaches, malaise/fatigue, neck pain, orthopnea, palpitations, peripheral edema, PND, shortness of breath or sweats  Past treatments include ACE inhibitors and calcium channel blockers  The current treatment provides significant improvement  There are no compliance problems  The following portions of the patient's history were reviewed and updated as appropriate: allergies, current medications, past family history, past medical history, past social history, past surgical history and problem list   Patient Active Problem List   Diagnosis    Benign localized hyperplasia of prostate    Essential hypertension    Hyperlipidemia, unspecified    Nicotine dependence    Nummular dermatitis     History reviewed  No pertinent past medical history  History reviewed  No pertinent surgical history  No Known Allergies  Family History   Problem Relation Age of Onset    Hypertension Mother     Diabetes Father     Parkinsonism Father     Heart disease Sister         coronary heart disease(CHD)    Heart attack Sister 62        myocardial infarction     Social History     Socioeconomic History    Marital status:      Spouse name: Not on file    Number of children: Not on file    Years of education: Not on file    Highest education level: Not on file   Occupational History    Not on file   Social Needs    Financial resource strain: Not on file    Food insecurity:     Worry: Not on file     Inability: Not on file    Transportation needs:     Medical: Not on file     Non-medical: Not on file   Tobacco Use    Smoking status: Current Every Day Smoker     Types: Cigars    Smokeless tobacco: Never Used    Tobacco comment: 5 cigars a day for 30 years       Substance and Sexual Activity    Alcohol use: Not on file    Drug use: Never    Sexual activity: Not on file   Lifestyle    Physical activity:     Days per week: Not on file     Minutes per session: Not on file    Stress: Not on file   Relationships    Social connections:     Talks on phone: Not on file     Gets together: Not on file     Attends [employer] service: Not on file     Active member of club or organization: Not on file     Attends meetings of clubs or organizations: Not on file     Relationship status: Not on file    Intimate partner violence:     Fear of current or ex partner: Not on file     Emotionally abused: Not on file     Physically abused: Not on file     Forced sexual activity: Not on file   Other Topics Concern    Not on file   Social History Narrative    Sendentary lifestyle    , per [employer]    Working full-time     Current Outpatient Medications on File Prior to Visit   Medication Sig Dispense Refill    amLODIPine (NORVASC) 5 mg tablet Take 1 tablet (5 mg total) by mouth daily 90 tablet 1    lisinopril (ZESTRIL) 40 mg tablet TAKE 1 TABLET BY MOUTH EVERY DAY 90 tablet 1     No current facility-administered medications on file prior to visit  Review of Systems   Constitutional: Negative  Negative for malaise/fatigue  HENT: Negative  Eyes: Negative  Negative for blurred vision  Respiratory: Negative  Negative for shortness of breath  Cardiovascular: Negative  Negative for chest pain, palpitations, orthopnea and PND  Gastrointestinal: Negative  Endocrine: Negative  Genitourinary: Negative  Musculoskeletal: Negative  Negative for neck pain  Skin: Negative  Allergic/Immunologic: Negative  Neurological: Negative  Negative for headaches  Hematological: Negative  Psychiatric/Behavioral: Negative  Objective:  Vitals:    03/18/19 0845 03/18/19 0905   BP: 140/80 134/80   BP Location: Left arm    Patient Position: Sitting    Cuff Size: Standard    Pulse: 78 64   Resp: 18    Temp: 98 6 °F (37 °C)    TempSrc: Tympanic    Weight: 82 1 kg (181 lb)    Height: 5' 7" (1 702 m)      Body mass index is 28 35 kg/m²    Wt Readings from Last 3 Encounters:   03/18/19 82 1 kg (181 lb)   12/13/18 83 kg (183 lb)   09/14/18 81 5 kg (179 lb 9 6 oz)     Temp Readings from Last 3 Encounters:   03/18/19 98 6 °F (37 °C) (Tympanic)   12/13/18 97 9 °F (36 6 °C) (Tympanic)   09/14/18 (!) 97 4 °F (36 3 °C) (Tympanic)     BP Readings from Last 3 Encounters:   03/18/19 134/80   12/13/18 140/84   09/14/18 140/88     Pulse Readings from Last 3 Encounters:   03/18/19 64   12/13/18 56   09/14/18 64        Physical Exam   Constitutional: He is oriented to person, place, and time  He appears well-developed and well-nourished  No distress  Eyes: Pupils are equal, round, and reactive to light  EOM are normal    Neck: Normal range of motion  Neck supple  No JVD present  No tracheal deviation present  No thyromegaly present  Cardiovascular: Normal rate, regular rhythm and normal heart sounds  Exam reveals no gallop and no friction rub  No murmur heard  Pulmonary/Chest: Effort normal and breath sounds normal  No stridor  No respiratory distress  He has no wheezes  He has no rales  He exhibits no tenderness  Abdominal: Soft  Bowel sounds are normal  He exhibits no distension and no mass  There is no tenderness  There is no rebound and no guarding  Musculoskeletal: Normal range of motion  Lymphadenopathy:     He has no cervical adenopathy  Neurological: He is alert and oriented to person, place, and time  He has normal reflexes  He displays normal reflexes  No cranial nerve deficit  He exhibits normal muscle tone  Coordination normal    Skin: Skin is warm and dry  No rash noted  He is not diaphoretic  No erythema  No pallor  Nursing note and vitals reviewed  No visits with results within 2 Month(s) from this visit     Latest known visit with results is:   Orders Only on 06/01/2018   Component Date Value    Glucose, Random 06/01/2018 109*    BUN 06/01/2018 15     Creatinine 06/01/2018 0 79     eGFR Non African American 06/01/2018 95     eGFR  06/01/2018 110     SL AMB BUN/CREATININE RA* 06/01/2018 19     Sodium 06/01/2018 139     Potassium 06/01/2018 4 6  Chloride 06/01/2018 100     CO2 06/01/2018 24     CALCIUM 06/01/2018 8 7     Protein, Total 06/01/2018 6 1     Albumin 06/01/2018 4 1     Globulin, Total 06/01/2018 2 0     Albumin/Globulin Ratio 06/01/2018 2 1     TOTAL BILIRUBIN 06/01/2018 <0 2     Alk Phos Isoenzymes 06/01/2018 42     AST 06/01/2018 41*    ALT 06/01/2018 39     Cholesterol, Total 06/01/2018 157     Triglycerides 06/01/2018 103     HDL 06/01/2018 37*    VLDL Cholesterol Calcula* 06/01/2018 21     LDL Direct 06/01/2018 99     T  Chol/HDL Ratio 06/01/2018 4 2     LDL Cholesterol 06/01/2018 98       BMI Counseling: Body mass index is 28 35 kg/m²  Discussed the patient's BMI with him  The BMI is above average  BMI counseling and education was provided to the patient  Nutrition recommendations include reducing portion sizes, decreasing overall calorie intake, 3-5 servings of fruits/vegetables daily, consuming healthier snacks, moderation in carbohydrate intake, increasing intake of lean protein, reducing intake of saturated fat and trans fat and reducing intake of cholesterol  Exercise recommendations include exercising 3-5 times per week and strength training exercises

## 2019-05-30 LAB
ALBUMIN SERPL-MCNC: 4.6 G/DL (ref 3.6–4.8)
ALBUMIN/GLOB SERPL: 2 {RATIO} (ref 1.2–2.2)
ALP SERPL-CCNC: 46 IU/L (ref 39–117)
ALT SERPL-CCNC: 28 IU/L (ref 0–44)
APPEARANCE UR: CLEAR
AST SERPL-CCNC: 25 IU/L (ref 0–40)
BASOPHILS # BLD AUTO: 0 X10E3/UL (ref 0–0.2)
BASOPHILS NFR BLD AUTO: 0 %
BILIRUB SERPL-MCNC: 0.3 MG/DL (ref 0–1.2)
BILIRUB UR QL STRIP: NEGATIVE
BUN SERPL-MCNC: 16 MG/DL (ref 8–27)
BUN/CREAT SERPL: 18 (ref 10–24)
CALCIUM SERPL-MCNC: 9.1 MG/DL (ref 8.6–10.2)
CHLORIDE SERPL-SCNC: 101 MMOL/L (ref 96–106)
CHOLEST SERPL-MCNC: 201 MG/DL (ref 100–199)
CHOLEST/HDLC SERPL: 5.7 RATIO (ref 0–5)
CO2 SERPL-SCNC: 24 MMOL/L (ref 20–29)
COLOR UR: YELLOW
CREAT SERPL-MCNC: 0.9 MG/DL (ref 0.76–1.27)
EOSINOPHIL # BLD AUTO: 0.2 X10E3/UL (ref 0–0.4)
EOSINOPHIL NFR BLD AUTO: 2 %
ERYTHROCYTE [DISTWIDTH] IN BLOOD BY AUTOMATED COUNT: 13.9 % (ref 12.3–15.4)
GLOBULIN SER-MCNC: 2.3 G/DL (ref 1.5–4.5)
GLUCOSE SERPL-MCNC: 100 MG/DL (ref 65–99)
GLUCOSE UR QL: NEGATIVE
HCT VFR BLD AUTO: 40.8 % (ref 37.5–51)
HCV AB S/CO SERPL IA: <0.1 S/CO RATIO (ref 0–0.9)
HDLC SERPL-MCNC: 35 MG/DL
HGB BLD-MCNC: 13.7 G/DL (ref 13–17.7)
HGB UR QL STRIP: NEGATIVE
IMM GRANULOCYTES # BLD: 0 X10E3/UL (ref 0–0.1)
IMM GRANULOCYTES NFR BLD: 1 %
KETONES UR QL STRIP: NEGATIVE
LDLC SERPL CALC-MCNC: 138 MG/DL (ref 0–99)
LDLC SERPL DIRECT ASSAY-MCNC: 134 MG/DL (ref 0–99)
LEUKOCYTE ESTERASE UR QL STRIP: NEGATIVE
LYMPHOCYTES # BLD AUTO: 2.9 X10E3/UL (ref 0.7–3.1)
LYMPHOCYTES NFR BLD AUTO: 33 %
MCH RBC QN AUTO: 31.2 PG (ref 26.6–33)
MCHC RBC AUTO-ENTMCNC: 33.6 G/DL (ref 31.5–35.7)
MCV RBC AUTO: 93 FL (ref 79–97)
MICRO URNS: NORMAL
MONOCYTES # BLD AUTO: 0.9 X10E3/UL (ref 0.1–0.9)
MONOCYTES NFR BLD AUTO: 10 %
NEUTROPHILS # BLD AUTO: 4.8 X10E3/UL (ref 1.4–7)
NEUTROPHILS NFR BLD AUTO: 54 %
NITRITE UR QL STRIP: NEGATIVE
PH UR STRIP: 5.5 [PH] (ref 5–7.5)
PLATELET # BLD AUTO: 265 X10E3/UL (ref 150–450)
POTASSIUM SERPL-SCNC: 4.4 MMOL/L (ref 3.5–5.2)
PROT SERPL-MCNC: 6.9 G/DL (ref 6–8.5)
PROT UR QL STRIP: NEGATIVE
RBC # BLD AUTO: 4.39 X10E6/UL (ref 4.14–5.8)
SL AMB EGFR AFRICAN AMERICAN: 103 ML/MIN/1.73
SL AMB EGFR NON AFRICAN AMERICAN: 89 ML/MIN/1.73
SL AMB VLDL CHOLESTEROL CALC: 28 MG/DL (ref 5–40)
SODIUM SERPL-SCNC: 138 MMOL/L (ref 134–144)
SP GR UR: 1.02 (ref 1–1.03)
TRIGL SERPL-MCNC: 141 MG/DL (ref 0–149)
TSH SERPL DL<=0.005 MIU/L-ACNC: 3.18 UIU/ML (ref 0.45–4.5)
UROBILINOGEN UR STRIP-ACNC: 0.2 EU/DL (ref 0.2–1)
WBC # BLD AUTO: 8.8 X10E3/UL (ref 3.4–10.8)

## 2019-07-11 DIAGNOSIS — I10 ESSENTIAL HYPERTENSION: ICD-10-CM

## 2019-07-11 RX ORDER — LISINOPRIL 40 MG/1
TABLET ORAL
Qty: 90 TABLET | Refills: 1 | OUTPATIENT
Start: 2019-07-11

## 2019-07-11 RX ORDER — AMLODIPINE BESYLATE 5 MG/1
TABLET ORAL
Qty: 90 TABLET | Refills: 1 | OUTPATIENT
Start: 2019-07-11

## 2019-07-18 ENCOUNTER — OFFICE VISIT (OUTPATIENT)
Dept: FAMILY MEDICINE CLINIC | Facility: CLINIC | Age: 64
End: 2019-07-18
Payer: COMMERCIAL

## 2019-07-18 VITALS
RESPIRATION RATE: 98 BRPM | DIASTOLIC BLOOD PRESSURE: 78 MMHG | BODY MASS INDEX: 27.91 KG/M2 | TEMPERATURE: 96.2 F | HEIGHT: 67 IN | HEART RATE: 68 BPM | SYSTOLIC BLOOD PRESSURE: 130 MMHG | WEIGHT: 177.8 LBS

## 2019-07-18 DIAGNOSIS — E78.2 MIXED HYPERLIPIDEMIA: ICD-10-CM

## 2019-07-18 DIAGNOSIS — F17.290 OTHER TOBACCO PRODUCT NICOTINE DEPENDENCE, UNCOMPLICATED: ICD-10-CM

## 2019-07-18 DIAGNOSIS — I10 ESSENTIAL HYPERTENSION: Primary | ICD-10-CM

## 2019-07-18 DIAGNOSIS — Z23 NEED FOR VACCINATION: ICD-10-CM

## 2019-07-18 PROCEDURE — 90471 IMMUNIZATION ADMIN: CPT | Performed by: FAMILY MEDICINE

## 2019-07-18 PROCEDURE — 99214 OFFICE O/P EST MOD 30 MIN: CPT | Performed by: FAMILY MEDICINE

## 2019-07-18 PROCEDURE — 4004F PT TOBACCO SCREEN RCVD TLK: CPT | Performed by: FAMILY MEDICINE

## 2019-07-18 PROCEDURE — 3075F SYST BP GE 130 - 139MM HG: CPT | Performed by: FAMILY MEDICINE

## 2019-07-18 PROCEDURE — 3008F BODY MASS INDEX DOCD: CPT | Performed by: FAMILY MEDICINE

## 2019-07-18 PROCEDURE — 90715 TDAP VACCINE 7 YRS/> IM: CPT | Performed by: FAMILY MEDICINE

## 2019-07-18 PROCEDURE — 1160F RVW MEDS BY RX/DR IN RCRD: CPT | Performed by: FAMILY MEDICINE

## 2019-07-18 RX ORDER — AMLODIPINE BESYLATE 5 MG/1
5 TABLET ORAL DAILY
Qty: 90 TABLET | Refills: 1 | Status: SHIPPED | OUTPATIENT
Start: 2019-07-18 | End: 2020-01-28 | Stop reason: SDUPTHER

## 2019-07-18 NOTE — PROGRESS NOTES
Assessment/Plan:  Essential hypertension  The patient's blood pressure is under good control with his current medication  He will continue on same medication  He will continue to work on his diet and to reduce his carbohydrate intake and increase his exercise  He will continue with his same medications  We will see him back in the office as scheduled  Hyperlipidemia, unspecified  Patient's lipids were elevated on his current lab work  His calculated 10 year ASCVD risk is 25 1% which puts him at moderate risk  I did advise him that he should be on a statin for his hyperlipidemia due to his strong family history with first-degree relative as well as his elevated score  The patient adamantly refuses to take a statin and wants to manage his cholesterol with lifestyle modification  He is aware of his increased risk for a heart attack or stroke based on his numbers but still refuses to take a statin  We will continue to work on his diet and exercise  Nicotine dependence  Tobacco Cessation Counseling: Tobacco cessation counseling and education was provided  The patient is sincerely urged to quit consumption of tobacco  He is not ready to quit tobacco  The numerous health risks of tobacco consumption were discussed  If he decides to quit, there are  anumber of helpful adjunctive aids, and he can see me to discuss nicotine replacement therapy, chantix, or bupropion anytime in the future  The patient is aware that he needs to quit smoking  He still refuses to do this  Patient will follow up as scheduled     Diagnoses and all orders for this visit:    Essential hypertension  -     amLODIPine (NORVASC) 5 mg tablet; Take 1 tablet (5 mg total) by mouth daily    Mixed hyperlipidemia    Other tobacco product nicotine dependence, uncomplicated    Need for vaccination  -     TDAP VACCINE GREATER THAN OR EQUAL TO 6YO IM       Return in about 6 months (around 1/18/2020) for Recheck       Subjective:   Chief Complaint   Patient presents with    Follow-up     4 month follow up, patient had blood work done         Patient ID: Iggy Deutsch is a 70 y o  male presents today for a routine checkup  Iggy Deutsch is a 70 y o  male who presents today to follow-up on his hypertension, hyperlipidemia, and nicotine dependence  He is here today to review his recent blood work  The patient denies any chest pain, shortness of breath, or palpitations  There is no edema  There are no headaches or visual changes  There is no lightheadedness, dizziness, or fainting spells  There are no problems with the medications  He is still smoking 1 ppd  He needs refills today  Hypertension   This is a chronic problem  The current episode started more than 1 year ago  The problem is unchanged  The problem is controlled  Pertinent negatives include no anxiety, blurred vision, chest pain, headaches, malaise/fatigue, neck pain, orthopnea, palpitations, peripheral edema, PND, shortness of breath or sweats  Past treatments include calcium channel blockers and ACE inhibitors  The current treatment provides significant improvement  The following portions of the patient's history were reviewed and updated as appropriate: allergies, current medications, past family history, past medical history, past social history, past surgical history and problem list   Patient Active Problem List   Diagnosis    Benign localized hyperplasia of prostate    Essential hypertension    Hyperlipidemia, unspecified    Nicotine dependence    Nummular dermatitis     No past medical history on file  No past surgical history on file    No Known Allergies  Family History   Problem Relation Age of Onset    Hypertension Mother     Diabetes Father     Parkinsonism Father     Heart disease Sister         coronary heart disease(CHD)    Heart attack Sister 62        myocardial infarction     Social History     Socioeconomic History    Marital status:  Spouse name: Not on file    Number of children: Not on file    Years of education: Not on file    Highest education level: Not on file   Occupational History    Not on file   Social Needs    Financial resource strain: Not on file    Food insecurity:     Worry: Not on file     Inability: Not on file    Transportation needs:     Medical: Not on file     Non-medical: Not on file   Tobacco Use    Smoking status: Current Every Day Smoker     Types: Cigars    Smokeless tobacco: Never Used    Tobacco comment: 5 cigars a day for 30 years  Substance and Sexual Activity    Alcohol use: Yes     Alcohol/week: 7 0 standard drinks     Types: 7 Cans of beer per week     Frequency: Monthly or less     Drinks per session: 3 or 4     Binge frequency: Less than monthly    Drug use: Never    Sexual activity: Not Currently   Lifestyle    Physical activity:     Days per week: Not on file     Minutes per session: Not on file    Stress: Not on file   Relationships    Social connections:     Talks on phone: Not on file     Gets together: Not on file     Attends Yarsani service: Not on file     Active member of club or organization: Not on file     Attends meetings of clubs or organizations: Not on file     Relationship status: Not on file    Intimate partner violence:     Fear of current or ex partner: Not on file     Emotionally abused: Not on file     Physically abused: Not on file     Forced sexual activity: Not on file   Other Topics Concern    Not on file   Social History Narrative    Sendentary lifestyle    , per [employer]    Working full-time     Current Outpatient Medications on File Prior to Visit   Medication Sig Dispense Refill    lisinopril (ZESTRIL) 40 mg tablet TAKE 1 TABLET BY MOUTH EVERY DAY 90 tablet 1    [DISCONTINUED] amLODIPine (NORVASC) 5 mg tablet Take 1 tablet (5 mg total) by mouth daily 90 tablet 1     No current facility-administered medications on file prior to visit  Review of Systems   Constitutional: Negative  Negative for malaise/fatigue  HENT: Negative  Eyes: Negative  Negative for blurred vision  Respiratory: Negative  Negative for shortness of breath  Cardiovascular: Negative  Negative for chest pain, palpitations, orthopnea and PND  Gastrointestinal: Negative  Endocrine: Negative  Genitourinary: Negative  Musculoskeletal: Negative  Negative for neck pain  Skin: Negative  Allergic/Immunologic: Negative  Neurological: Negative  Negative for headaches  Hematological: Negative  Psychiatric/Behavioral: Negative  Objective:  Vitals:    07/18/19 0853 07/18/19 0937   BP: 132/84 130/78   BP Location: Right arm    Patient Position: Sitting    Cuff Size: Standard    Pulse: 62 68   Resp: (!) 98    Temp: (!) 96 2 °F (35 7 °C)    TempSrc: Tympanic    Weight: 80 6 kg (177 lb 12 8 oz)    Height: 5' 7" (1 702 m)      Body mass index is 27 85 kg/m²  Physical Exam   Constitutional: He is oriented to person, place, and time  He appears well-developed and well-nourished  No distress  Eyes: Pupils are equal, round, and reactive to light  EOM are normal    Neck: Normal range of motion  Neck supple  No JVD present  No tracheal deviation present  No thyromegaly present  Cardiovascular: Normal rate, regular rhythm and normal heart sounds  Exam reveals no gallop and no friction rub  No murmur heard  Pulmonary/Chest: Effort normal and breath sounds normal  No stridor  No respiratory distress  He has no wheezes  He has no rales  He exhibits no tenderness  Abdominal: Soft  Bowel sounds are normal  He exhibits no distension and no mass  There is no tenderness  There is no rebound and no guarding  Musculoskeletal: Normal range of motion  Lymphadenopathy:     He has no cervical adenopathy  Neurological: He is alert and oriented to person, place, and time  He has normal reflexes  He displays normal reflexes  No cranial nerve deficit   He exhibits normal muscle tone  Coordination normal    Skin: Skin is warm and dry  No rash noted  He is not diaphoretic  No erythema  No pallor  Nursing note and vitals reviewed  No visits with results within 2 Month(s) from this visit  Latest known visit with results is:   Office Visit on 03/18/2019   Component Date Value    Glucose, Random 05/29/2019 100*    BUN 05/29/2019 16     Creatinine 05/29/2019 0 90     eGFR Non African American 05/29/2019 89     eGFR  05/29/2019 103     SL AMB BUN/CREATININE RA* 05/29/2019 18     Sodium 05/29/2019 138     Potassium 05/29/2019 4 4     Chloride 05/29/2019 101     CO2 05/29/2019 24     CALCIUM 05/29/2019 9 1     Protein, Total 05/29/2019 6 9     Albumin 05/29/2019 4 6     Globulin, Total 05/29/2019 2 3     Albumin/Globulin Ratio 05/29/2019 2 0     TOTAL BILIRUBIN 05/29/2019 0 3     Alk Phos Isoenzymes 05/29/2019 46     AST 05/29/2019 25     ALT 05/29/2019 28     LDL Cholesterol 05/29/2019 134*    Cholesterol, Total 05/29/2019 201*    Triglycerides 05/29/2019 141     HDL 05/29/2019 35*    VLDL Cholesterol Calcula* 05/29/2019 28     LDL Direct 05/29/2019 138*    T   Chol/HDL Ratio 05/29/2019 5 7*    White Blood Cell Count 05/29/2019 8 8     Red Blood Cell Count 05/29/2019 4 39     Hemoglobin 05/29/2019 13 7     HCT 05/29/2019 40 8     MCV 05/29/2019 93     MCH 05/29/2019 31 2     MCHC 05/29/2019 33 6     RDW 05/29/2019 13 9     Platelet Count 17/30/7092 265     Neutrophils 05/29/2019 54     Lymphocytes 05/29/2019 33     Monocytes 05/29/2019 10     Eosinophils 05/29/2019 2     Basophils PCT 05/29/2019 0     Neutrophils (Absolute) 05/29/2019 4 8     Lymphocytes (Absolute) 05/29/2019 2 9     Monocytes (Absolute) 05/29/2019 0 9     Eosinophils (Absolute) 05/29/2019 0 2     Basophils ABS 05/29/2019 0 0     Immature Granulocytes 05/29/2019 1     Immature Granulocytes (A* 05/29/2019 0 0     TSH 05/29/2019 3 180     Specific Gravity 05/29/2019 1 020     Ph 05/29/2019 5 5     Color UA 05/29/2019 Yellow     Urine Appearance 05/29/2019 Clear     Leukocyte Esterase 05/29/2019 Negative     Protein 05/29/2019 Negative     Glucose, 24 HR Urine 05/29/2019 Negative     Ketone, Urine 05/29/2019 Negative     Blood, Urine 05/29/2019 Negative     Bilirubin, Urine 05/29/2019 Negative     Urobilinogen Urine 05/29/2019 0 2     SL AMB NITRITES URINE, Q* 05/29/2019 Negative     Microscopic Examination 05/29/2019 Comment     HEP C AB 05/29/2019 <0 1       The 10-year ASCVD risk score (Jose R Styles et al , 2013) is: 25 1%    Values used to calculate the score:      Age: 70 years      Sex: Male      Is Non- : No      Diabetic: No      Tobacco smoker: Yes      Systolic Blood Pressure: 719 mmHg      Is BP treated: Yes      HDL Cholesterol: 35 mg/dL      Total Cholesterol: 201 mg/dL

## 2019-07-18 NOTE — ASSESSMENT & PLAN NOTE
The patient's blood pressure is under good control with his current medication  He will continue on same medication  He will continue to work on his diet and to reduce his carbohydrate intake and increase his exercise  He will continue with his same medications  We will see him back in the office as scheduled

## 2019-07-18 NOTE — ASSESSMENT & PLAN NOTE
Tobacco Cessation Counseling: Tobacco cessation counseling and education was provided  The patient is sincerely urged to quit consumption of tobacco  He is not ready to quit tobacco  The numerous health risks of tobacco consumption were discussed  If he decides to quit, there are  anumber of helpful adjunctive aids, and he can see me to discuss nicotine replacement therapy, chantix, or bupropion anytime in the future  The patient is aware that he needs to quit smoking  He still refuses to do this

## 2019-07-18 NOTE — ASSESSMENT & PLAN NOTE
Patient's lipids were elevated on his current lab work  His calculated 10 year ASCVD risk is 25 1% which puts him at moderate risk  I did advise him that he should be on a statin for his hyperlipidemia due to his strong family history with first-degree relative as well as his elevated score  The patient adamantly refuses to take a statin and wants to manage his cholesterol with lifestyle modification  He is aware of his increased risk for a heart attack or stroke based on his numbers but still refuses to take a statin  We will continue to work on his diet and exercise

## 2019-07-18 NOTE — PATIENT INSTRUCTIONS
Heart Healthy Diet   WHAT YOU NEED TO KNOW:   A heart healthy diet is an eating plan low in total fat, unhealthy fats, and sodium (salt)  A heart healthy diet helps decrease your risk for heart disease and stroke  Limit the amount of fat you eat to 25% to 35% of your total daily calories  Limit sodium to less than 2,300 mg each day  DISCHARGE INSTRUCTIONS:   Healthy fats:  Healthy fats can help improve cholesterol levels  The risk for heart disease is decreased when cholesterol levels are normal  Choose healthy fats, such as the following:  · Unsaturated fat  is found in foods such as soybean, canola, olive, corn, and safflower oils  It is also found in soft tub margarine that is made with liquid vegetable oil  · Omega-3 fat  is found in certain fish, such as salmon, tuna, and trout, and in walnuts and flaxseed  Unhealthy fats:  Unhealthy fats can cause unhealthy cholesterol levels in your blood and increase your risk of heart disease  Limit unhealthy fats, such as the following:  · Cholesterol  is found in animal foods, such as eggs and lobster, and in dairy products made from whole milk  Limit cholesterol to less than 300 milligrams (mg) each day  You may need to limit cholesterol to 200 mg each day if you have heart disease  · Saturated fat  is found in meats, such as hercules and hamburger  It is also found in chicken or turkey skin, whole milk, and butter  Limit saturated fat to less than 7% of your total daily calories  Limit saturated fat to less than 6% if you have heart disease or are at increased risk for it  · Trans fat  is found in packaged foods, such as potato chips and cookies  It is also in hard margarine, some fried foods, and shortening  Avoid trans fats as much as possible    Heart healthy foods and drinks to include:  Ask your dietitian or healthcare provider how many servings to have from each of the following food groups:  · Grains:      ¨ Whole-wheat breads, cereals, and pastas, and brown rice    ¨ Low-fat, low-sodium crackers and chips    · Vegetables:      ¨ Broccoli, green beans, green peas, and spinach    ¨ Collards, kale, and lima beans    ¨ Carrots, sweet potatoes, tomatoes, and peppers    ¨ Canned vegetables with no salt added    · Fruits:      ¨ Bananas, peaches, pears, and pineapple    ¨ Grapes, raisins, and dates    ¨ Oranges, tangerines, grapefruit, orange juice, and grapefruit juice    ¨ Apricots, mangoes, melons, and papaya    ¨ Raspberries and strawberries    ¨ Canned fruit with no added sugar    · Low-fat dairy products:      ¨ Nonfat (skim) milk, 1% milk, and low-fat almond, cashew, or soy milks fortified with calcium    ¨ Low-fat cheese, regular or frozen yogurt, and cottage cheese    · Meats and proteins , such as lean cuts of beef and pork (loin, leg, round), skinless chicken and turkey, legumes, soy products, egg whites, and nuts  Foods and drinks to limit or avoid:  Ask your dietitian or healthcare provider about these and other foods that are high in unhealthy fat, sodium, and sugar:  · Snack or packaged foods , such as frozen dinners, cookies, macaroni and cheese, and cereals with more than 300 mg of sodium per serving    · Canned or dry mixes  for cakes, soups, sauces, or gravies    · Vegetables with added sodium , such as instant potatoes, vegetables with added sauces, or regular canned vegetables    · Other foods high in sodium , such as ketchup, barbecue sauce, salad dressing, pickles, olives, soy sauce, and miso    · High-fat dairy foods  such as whole or 2% milk, cream cheese, or sour cream, and cheeses     · High-fat protein foods  such as high-fat cuts of beef (T-bone steaks, ribs), chicken or turkey with skin, and organ meats, such as liver    · Cured or smoked meats , such as hot dogs, hercules, and sausage    · Unhealthy fats and oils , such as butter, stick margarine, shortening, and cooking oils such as coconut or palm oil    · Food and drinks high in sugar , such as soft drinks (soda), sports drinks, sweetened tea, candy, cake, cookies, pies, and doughnuts  Other diet guidelines to follow:   · Eat more foods containing omega-3 fats  Eat fish high in omega-3 fats at least 2 times a week  · Limit alcohol  Too much alcohol can damage your heart and raise your blood pressure  Women should limit alcohol to 1 drink a day  Men should limit alcohol to 2 drinks a day  A drink of alcohol is 12 ounces of beer, 5 ounces of wine, or 1½ ounces of liquor  · Choose low-sodium foods  High-sodium foods can lead to high blood pressure  Add little or no salt to food you prepare  Use herbs and spices in place of salt  · Eat more fiber  to help lower cholesterol levels  Eat at least 5 servings of fruits and vegetables each day  Eat 3 ounces of whole-grain foods each day  Legumes (beans) are also a good source of fiber  Lifestyle guidelines:   · Do not smoke  Nicotine and other chemicals in cigarettes and cigars can cause lung and heart damage  Ask your healthcare provider for information if you currently smoke and need help to quit  E-cigarettes or smokeless tobacco still contain nicotine  Talk to your healthcare provider before you use these products  · Exercise regularly  to help you maintain a healthy weight and improve your blood pressure and cholesterol levels  Ask your healthcare provider about the best exercise plan for you  Do not start an exercise program without asking your healthcare provider  Follow up with your healthcare provider as directed:  Write down your questions so you remember to ask them during your visits  © 2017 [employer] [employer] Information is for End User's use only and may not be sold, redistributed or otherwise used for commercial purposes  All illustrations and images included in CareNotes® are the copyrighted property of A D [employer] M [employer] Inc  or Nish [employer]wood  The above information is an  only   It is not intended as medical advice for individual conditions or treatments  Talk to your doctor, nurse or pharmacist before following any medical regimen to see if it is safe and effective for you

## 2019-08-05 DIAGNOSIS — I10 ESSENTIAL HYPERTENSION: ICD-10-CM

## 2019-08-05 RX ORDER — LISINOPRIL 40 MG/1
40 TABLET ORAL DAILY
Qty: 90 TABLET | Refills: 1 | Status: SHIPPED | OUTPATIENT
Start: 2019-08-05 | End: 2020-01-28 | Stop reason: SDUPTHER

## 2019-08-05 NOTE — TELEPHONE ENCOUNTER
pcp dr Cassi Bernstein, last seen with her on 7/18/19  Refill needs to go to [facility]    Thank you

## 2020-01-20 ENCOUNTER — OFFICE VISIT (OUTPATIENT)
Dept: FAMILY MEDICINE CLINIC | Facility: CLINIC | Age: 65
End: 2020-01-20
Payer: COMMERCIAL

## 2020-01-20 VITALS
TEMPERATURE: 97.2 F | BODY MASS INDEX: 29.51 KG/M2 | WEIGHT: 188 LBS | SYSTOLIC BLOOD PRESSURE: 120 MMHG | HEIGHT: 67 IN | HEART RATE: 68 BPM | DIASTOLIC BLOOD PRESSURE: 78 MMHG | RESPIRATION RATE: 12 BRPM

## 2020-01-20 DIAGNOSIS — Z12.5 SCREENING FOR PROSTATE CANCER: ICD-10-CM

## 2020-01-20 DIAGNOSIS — E78.2 MIXED HYPERLIPIDEMIA: ICD-10-CM

## 2020-01-20 DIAGNOSIS — F17.290 OTHER TOBACCO PRODUCT NICOTINE DEPENDENCE, UNCOMPLICATED: ICD-10-CM

## 2020-01-20 DIAGNOSIS — I10 ESSENTIAL HYPERTENSION: Primary | ICD-10-CM

## 2020-01-20 PROCEDURE — 3008F BODY MASS INDEX DOCD: CPT | Performed by: FAMILY MEDICINE

## 2020-01-20 PROCEDURE — 4004F PT TOBACCO SCREEN RCVD TLK: CPT | Performed by: FAMILY MEDICINE

## 2020-01-20 PROCEDURE — 1160F RVW MEDS BY RX/DR IN RCRD: CPT | Performed by: FAMILY MEDICINE

## 2020-01-20 PROCEDURE — 3078F DIAST BP <80 MM HG: CPT | Performed by: FAMILY MEDICINE

## 2020-01-20 PROCEDURE — 3074F SYST BP LT 130 MM HG: CPT | Performed by: FAMILY MEDICINE

## 2020-01-20 PROCEDURE — 99214 OFFICE O/P EST MOD 30 MIN: CPT | Performed by: FAMILY MEDICINE

## 2020-01-20 NOTE — ASSESSMENT & PLAN NOTE
Patient will continue to work on improving his diet and exercise    We will see him back as scheduled for his physical

## 2020-01-20 NOTE — ASSESSMENT & PLAN NOTE
Tobacco Cessation Counseling: Tobacco cessation counseling and education was provided  The patient is sincerely urged to quit consumption of tobacco  He is not ready to quit tobacco  The numerous health risks of tobacco consumption were discussed  If he decides to quit, there are a number of helpful adjunctive aids, and he can see me to discuss nicotine replacement therapy,   chantix, or bupropion anytime in the future  The patient refuses to quit smoking  He also refuses to go for the lung cancer screening CT as previously ordered    We will see him back as scheduled

## 2020-01-20 NOTE — ASSESSMENT & PLAN NOTE
The patient's blood pressure is well controlled with his current medication  He will continue with the amlodipine and the lisinopril  He was encouraged again to continue with his diet and exercise  We will see him back as scheduled in 6 months for physical and he will get the lab work done prior to that appointment

## 2020-01-20 NOTE — PROGRESS NOTES
Assessment/Plan:  Essential hypertension  The patient's blood pressure is well controlled with his current medication  He will continue with the amlodipine and the lisinopril  He was encouraged again to continue with his diet and exercise  We will see him back as scheduled in 6 months for physical and he will get the lab work done prior to that appointment  Hyperlipidemia, unspecified  Patient will continue to work on improving his diet and exercise  We will see him back as scheduled for his physical     Nicotine dependence  Tobacco Cessation Counseling: Tobacco cessation counseling and education was provided  The patient is sincerely urged to quit consumption of tobacco  He is not ready to quit tobacco  The numerous health risks of tobacco consumption were discussed  If he decides to quit, there are a number of helpful adjunctive aids, and he can see me to discuss nicotine replacement therapy,   chantix, or bupropion anytime in the future  The patient refuses to quit smoking  He also refuses to go for the lung cancer screening CT as previously ordered  We will see him back as scheduled           Diagnoses and all orders for this visit:    Essential hypertension  -     CBC and differential; Future  -     Comprehensive metabolic panel; Future  -     LDL cholesterol, direct; Future  -     Lipid panel; Future  -     TSH, 3rd generation with Free T4 reflex; Future  -     UA (URINE) with reflex to Scope; Future  -     CBC and differential  -     Comprehensive metabolic panel  -     LDL cholesterol, direct  -     Lipid panel  -     TSH, 3rd generation with Free T4 reflex  -     UA (URINE) with reflex to Scope    Mixed hyperlipidemia  -     CBC and differential; Future  -     Comprehensive metabolic panel; Future  -     LDL cholesterol, direct; Future  -     Lipid panel; Future  -     TSH, 3rd generation with Free T4 reflex; Future  -     UA (URINE) with reflex to Scope;  Future  -     CBC and differential  - Comprehensive metabolic panel  -     LDL cholesterol, direct  -     Lipid panel  -     TSH, 3rd generation with Free T4 reflex  -     UA (URINE) with reflex to Scope    Other tobacco product nicotine dependence, uncomplicated    Screening for prostate cancer  -     PSA, ultrasensitive; Future  -     PSA, ultrasensitive       Return in about 6 months (around 7/20/2020) for Annual physical    I believe that the patient's mild shoulder discomfort was a mild strain of the left shoulder  He currently is symptom free  I advised him that we will monitor it at this point and he should follow-up with any recurrent  Subjective:   Chief Complaint   Patient presents with    Follow-up     6 month checkup        Patient ID: Christine Noriega is a 75 y o  male presents today for a routine checkup  Christine Noriega is a 75 y o  male who presents today for follow-up of his hypertension, hyperlipidemia, and nicotine dependence  He has had left shoulder pain on and off for a month- it is aching  There was no injury  There is no loss of range of motion  The strap of his lunch pain irritates it  There is no radiation  He is left hand dominant  He works at [employer]  There is no stiffness  There is no loss of range of motion  The patient denies any chest pain, shortness of breath, or palpitations  There is no edema  There are no headaches or visual changes  There is no lightheadedness, dizziness, or fainting spells  He is still smoking  Hypertension   This is a chronic problem  The current episode started more than 1 year ago  The problem is unchanged  The problem is controlled  Pertinent negatives include no anxiety, blurred vision, chest pain, headaches, malaise/fatigue, neck pain, orthopnea, palpitations, peripheral edema, PND, shortness of breath or sweats       The following portions of the patient's history were reviewed and updated as appropriate: allergies, current medications, past family history, past medical history, past social history, past surgical history and problem list   Patient Active Problem List   Diagnosis    Benign localized hyperplasia of prostate    Essential hypertension    Hyperlipidemia, unspecified    Nicotine dependence    Nummular dermatitis     History reviewed  No pertinent past medical history  History reviewed  No pertinent surgical history  No Known Allergies  Family History   Problem Relation Age of Onset    Hypertension Mother     Diabetes Father     Parkinsonism Father     Heart disease Sister         coronary heart disease(CHD)    Heart attack Sister 62        myocardial infarction     Social History     Socioeconomic History    Marital status:      Spouse name: Not on file    Number of children: Not on file    Years of education: Not on file    Highest education level: Not on file   Occupational History    Not on file   Social Needs    Financial resource strain: Not hard at all   PeopleJam insecurity:     Worry: Never true     Inability: Never true   [facility] needs:     Medical: No     Non-medical: No   Tobacco Use    Smoking status: Current Every Day Smoker     Types: Cigars    Smokeless tobacco: Never Used    Tobacco comment: 5 cigars a day for 30 years  Substance and Sexual Activity    Alcohol use: Yes     Alcohol/week: 7 0 standard drinks     Types: 7 Cans of beer per week     Frequency: Monthly or less     Drinks per session: 3 or 4     Binge frequency: Less than monthly    Drug use: Never    Sexual activity: Not Currently   Lifestyle    Physical activity:     Days per week: 0 days     Minutes per session: 0 min    Stress:  To some extent   Relationships    Social connections:     Talks on phone: More than three times a week     Gets together: More than three times a week     Attends [employer] service: Never     Active member of club or organization: No     Attends meetings of clubs or organizations: Never     Relationship status:     Intimate partner violence:     Fear of current or ex partner: No     Emotionally abused: No     Physically abused: No     Forced sexual activity: No   Other Topics Concern    Not on file   Social History Narrative    Sendentary lifestyle    , per [employer]    Working full-time     Current Outpatient Medications on File Prior to Visit   Medication Sig Dispense Refill    amLODIPine (NORVASC) 5 mg tablet Take 1 tablet (5 mg total) by mouth daily 90 tablet 1    lisinopril (ZESTRIL) 40 mg tablet Take 1 tablet (40 mg total) by mouth daily 90 tablet 1     No current facility-administered medications on file prior to visit  Review of Systems   Constitutional: Negative  Negative for malaise/fatigue  HENT: Negative  Eyes: Negative  Negative for blurred vision  Respiratory: Negative  Negative for shortness of breath  Cardiovascular: Negative  Negative for chest pain, palpitations, orthopnea and PND  Gastrointestinal: Negative  Endocrine: Negative  Genitourinary: Negative  Musculoskeletal: Negative  Negative for neck pain  Skin: Negative  Allergic/Immunologic: Negative  Neurological: Negative  Negative for headaches  Hematological: Negative  Psychiatric/Behavioral: Negative  Objective:  Vitals:    01/20/20 1105 01/20/20 1132   BP: 114/72 120/78   BP Location: Left arm    Patient Position: Sitting    Cuff Size: Standard    Pulse: 66 68   Resp: 12    Temp: (!) 97 2 °F (36 2 °C)    TempSrc: Tympanic    Weight: 85 3 kg (188 lb)    Height: 5' 7" (1 702 m)      Body mass index is 29 44 kg/m²  Physical Exam   Constitutional: He is oriented to person, place, and time  He appears well-developed and well-nourished  No distress  Eyes: Pupils are equal, round, and reactive to light  EOM are normal    Neck: Normal range of motion  Neck supple  No JVD present  No tracheal deviation present  No thyromegaly present     Cardiovascular: Normal rate, regular rhythm and normal heart sounds  Exam reveals no gallop and no friction rub  No murmur heard  Pulmonary/Chest: Effort normal and breath sounds normal  No stridor  No respiratory distress  He has no wheezes  He has no rales  He exhibits no tenderness  Abdominal: Soft  Bowel sounds are normal  He exhibits no distension and no mass  There is no tenderness  There is no rebound and no guarding  Musculoskeletal: Normal range of motion  Lymphadenopathy:     He has no cervical adenopathy  Neurological: He is alert and oriented to person, place, and time  He has normal reflexes  He displays normal reflexes  No cranial nerve deficit  He exhibits normal muscle tone  Coordination normal    Skin: Skin is warm and dry  No rash noted  He is not diaphoretic  No erythema  No pallor  Nursing note and vitals reviewed  No visits with results within 2 Month(s) from this visit  Latest known visit with results is:   Office Visit on 03/18/2019   Component Date Value    Glucose, Random 05/29/2019 100*    BUN 05/29/2019 16     Creatinine 05/29/2019 0 90     eGFR Non African American 05/29/2019 89     eGFR  05/29/2019 103     SL AMB BUN/CREATININE RA* 05/29/2019 18     Sodium 05/29/2019 138     Potassium 05/29/2019 4 4     Chloride 05/29/2019 101     CO2 05/29/2019 24     CALCIUM 05/29/2019 9 1     Protein, Total 05/29/2019 6 9     Albumin 05/29/2019 4 6     Globulin, Total 05/29/2019 2 3     Albumin/Globulin Ratio 05/29/2019 2 0     TOTAL BILIRUBIN 05/29/2019 0 3     Alk Phos Isoenzymes 05/29/2019 46     AST 05/29/2019 25     ALT 05/29/2019 28     LDL Cholesterol 05/29/2019 134*    Cholesterol, Total 05/29/2019 201*    Triglycerides 05/29/2019 141     HDL 05/29/2019 35*    VLDL Cholesterol Calcula* 05/29/2019 28     LDL Direct 05/29/2019 138*    T   Chol/HDL Ratio 05/29/2019 5 7*    White Blood Cell Count 05/29/2019 8 8     Red Blood Cell Count 05/29/2019 4 39     Hemoglobin 05/29/2019 13 7     HCT 05/29/2019 40 8     MCV 05/29/2019 93     MCH 05/29/2019 31 2     MCHC 05/29/2019 33 6     RDW 05/29/2019 13 9     Platelet Count 88/14/1419 265     Neutrophils 05/29/2019 54     Lymphocytes 05/29/2019 33     Monocytes 05/29/2019 10     Eosinophils 05/29/2019 2     Basophils PCT 05/29/2019 0     Neutrophils (Absolute) 05/29/2019 4 8     Lymphocytes (Absolute) 05/29/2019 2 9     Monocytes (Absolute) 05/29/2019 0 9     Eosinophils (Absolute) 05/29/2019 0 2     Basophils ABS 05/29/2019 0 0     Immature Granulocytes 05/29/2019 1     Immature Granulocytes (A* 05/29/2019 0 0     TSH 05/29/2019 3 180     Specific Gravity 05/29/2019 1 020     Ph 05/29/2019 5 5     Color UA 05/29/2019 Yellow     Urine Appearance 05/29/2019 Clear     Leukocyte Esterase 05/29/2019 Negative     Protein 05/29/2019 Negative     Glucose, 24 HR Urine 05/29/2019 Negative     Ketone, Urine 05/29/2019 Negative     Blood, Urine 05/29/2019 Negative     Bilirubin, Urine 05/29/2019 Negative     Urobilinogen Urine 05/29/2019 0 2     SL AMB NITRITES URINE, Q* 05/29/2019 Negative     Microscopic Examination 05/29/2019 Comment     HEP C AB 05/29/2019 <0 1       BMI Counseling: Body mass index is 29 44 kg/m²  The BMI is above normal  Nutrition recommendations include decreasing portion sizes, encouraging healthy choices of fruits and vegetables, decreasing fast food intake, consuming healthier snacks, limiting drinks that contain sugar, moderation in carbohydrate intake, increasing intake of lean protein, reducing intake of saturated and trans fat and reducing intake of cholesterol  Exercise recommendations include exercising 3-5 times per week and strength training exercises  No pharmacotherapy was ordered

## 2020-01-24 DIAGNOSIS — I10 ESSENTIAL HYPERTENSION: ICD-10-CM

## 2020-01-24 RX ORDER — LISINOPRIL 40 MG/1
TABLET ORAL
Qty: 90 TABLET | Refills: 0 | OUTPATIENT
Start: 2020-01-24

## 2020-01-24 RX ORDER — AMLODIPINE BESYLATE 5 MG/1
TABLET ORAL
Qty: 90 TABLET | Refills: 0 | OUTPATIENT
Start: 2020-01-24

## 2020-01-28 DIAGNOSIS — I10 ESSENTIAL HYPERTENSION: ICD-10-CM

## 2020-01-28 RX ORDER — LISINOPRIL 40 MG/1
40 TABLET ORAL DAILY
Qty: 90 TABLET | Refills: 1 | Status: SHIPPED | OUTPATIENT
Start: 2020-01-28 | End: 2020-07-23

## 2020-01-28 RX ORDER — AMLODIPINE BESYLATE 5 MG/1
5 TABLET ORAL DAILY
Qty: 90 TABLET | Refills: 1 | Status: SHIPPED | OUTPATIENT
Start: 2020-01-28 | End: 2020-07-23

## 2020-06-26 LAB
ALBUMIN SERPL-MCNC: 4.8 G/DL (ref 3.8–4.8)
ALBUMIN/GLOB SERPL: 2 {RATIO} (ref 1.2–2.2)
ALP SERPL-CCNC: 46 IU/L (ref 39–117)
ALT SERPL-CCNC: 30 IU/L (ref 0–44)
APPEARANCE UR: CLEAR
AST SERPL-CCNC: 24 IU/L (ref 0–40)
BASOPHILS # BLD AUTO: 0.1 X10E3/UL (ref 0–0.2)
BASOPHILS NFR BLD AUTO: 1 %
BILIRUB SERPL-MCNC: 0.4 MG/DL (ref 0–1.2)
BILIRUB UR QL STRIP: NEGATIVE
BUN SERPL-MCNC: 19 MG/DL (ref 8–27)
BUN/CREAT SERPL: 20 (ref 10–24)
CALCIUM SERPL-MCNC: 9.1 MG/DL (ref 8.6–10.2)
CHLORIDE SERPL-SCNC: 101 MMOL/L (ref 96–106)
CHOLEST SERPL-MCNC: 201 MG/DL (ref 100–199)
CHOLEST/HDLC SERPL: 5.3 RATIO (ref 0–5)
CO2 SERPL-SCNC: 24 MMOL/L (ref 20–29)
COLOR UR: YELLOW
CREAT SERPL-MCNC: 0.96 MG/DL (ref 0.76–1.27)
EOSINOPHIL # BLD AUTO: 0.1 X10E3/UL (ref 0–0.4)
EOSINOPHIL NFR BLD AUTO: 2 %
ERYTHROCYTE [DISTWIDTH] IN BLOOD BY AUTOMATED COUNT: 13.1 % (ref 11.6–15.4)
GLOBULIN SER-MCNC: 2.4 G/DL (ref 1.5–4.5)
GLUCOSE SERPL-MCNC: 110 MG/DL (ref 65–99)
GLUCOSE UR QL: NEGATIVE
HCT VFR BLD AUTO: 43 % (ref 37.5–51)
HDLC SERPL-MCNC: 38 MG/DL
HGB BLD-MCNC: 14.7 G/DL (ref 13–17.7)
HGB UR QL STRIP: NEGATIVE
IMM GRANULOCYTES # BLD: 0 X10E3/UL (ref 0–0.1)
IMM GRANULOCYTES NFR BLD: 0 %
KETONES UR QL STRIP: NEGATIVE
LDLC SERPL CALC-MCNC: 140 MG/DL (ref 0–99)
LDLC SERPL DIRECT ASSAY-MCNC: 135 MG/DL (ref 0–99)
LEUKOCYTE ESTERASE UR QL STRIP: NEGATIVE
LYMPHOCYTES # BLD AUTO: 2.4 X10E3/UL (ref 0.7–3.1)
LYMPHOCYTES NFR BLD AUTO: 37 %
MCH RBC QN AUTO: 32.1 PG (ref 26.6–33)
MCHC RBC AUTO-ENTMCNC: 34.2 G/DL (ref 31.5–35.7)
MCV RBC AUTO: 94 FL (ref 79–97)
MICRO URNS: NORMAL
MONOCYTES # BLD AUTO: 0.8 X10E3/UL (ref 0.1–0.9)
MONOCYTES NFR BLD AUTO: 12 %
NEUTROPHILS # BLD AUTO: 3.1 X10E3/UL (ref 1.4–7)
NEUTROPHILS NFR BLD AUTO: 48 %
NITRITE UR QL STRIP: NEGATIVE
PH UR STRIP: 5 [PH] (ref 5–7.5)
PLATELET # BLD AUTO: 264 X10E3/UL (ref 150–450)
POTASSIUM SERPL-SCNC: 4.3 MMOL/L (ref 3.5–5.2)
PROT SERPL-MCNC: 7.2 G/DL (ref 6–8.5)
PROT UR QL STRIP: NEGATIVE
PSA SERPL DL<=0.01 NG/ML-MCNC: 8.99 NG/ML (ref 0–4)
RBC # BLD AUTO: 4.58 X10E6/UL (ref 4.14–5.8)
SL AMB EGFR AFRICAN AMERICAN: 95 ML/MIN/1.73
SL AMB EGFR NON AFRICAN AMERICAN: 82 ML/MIN/1.73
SL AMB VLDL CHOLESTEROL CALC: 23 MG/DL (ref 5–40)
SODIUM SERPL-SCNC: 140 MMOL/L (ref 134–144)
SP GR UR: 1.02 (ref 1–1.03)
TRIGL SERPL-MCNC: 117 MG/DL (ref 0–149)
TSH SERPL DL<=0.005 MIU/L-ACNC: 1.69 UIU/ML (ref 0.45–4.5)
UROBILINOGEN UR STRIP-ACNC: 0.2 MG/DL (ref 0.2–1)
WBC # BLD AUTO: 6.4 X10E3/UL (ref 3.4–10.8)

## 2020-07-21 ENCOUNTER — OFFICE VISIT (OUTPATIENT)
Dept: FAMILY MEDICINE CLINIC | Facility: CLINIC | Age: 65
End: 2020-07-21
Payer: COMMERCIAL

## 2020-07-21 VITALS
SYSTOLIC BLOOD PRESSURE: 124 MMHG | BODY MASS INDEX: 28.28 KG/M2 | TEMPERATURE: 98.2 F | OXYGEN SATURATION: 97 % | RESPIRATION RATE: 16 BRPM | HEART RATE: 68 BPM | HEIGHT: 67 IN | WEIGHT: 180.2 LBS | DIASTOLIC BLOOD PRESSURE: 78 MMHG

## 2020-07-21 DIAGNOSIS — Z23 NEED FOR VACCINATION: ICD-10-CM

## 2020-07-21 DIAGNOSIS — Z12.11 SCREENING FOR COLON CANCER: ICD-10-CM

## 2020-07-21 DIAGNOSIS — F17.290 OTHER TOBACCO PRODUCT NICOTINE DEPENDENCE, UNCOMPLICATED: ICD-10-CM

## 2020-07-21 DIAGNOSIS — E78.2 MIXED HYPERLIPIDEMIA: Primary | ICD-10-CM

## 2020-07-21 DIAGNOSIS — Z00.00 ANNUAL PHYSICAL EXAM: Primary | ICD-10-CM

## 2020-07-21 PROCEDURE — 1160F RVW MEDS BY RX/DR IN RCRD: CPT | Performed by: FAMILY MEDICINE

## 2020-07-21 PROCEDURE — 3008F BODY MASS INDEX DOCD: CPT | Performed by: FAMILY MEDICINE

## 2020-07-21 PROCEDURE — 1101F PT FALLS ASSESS-DOCD LE1/YR: CPT | Performed by: FAMILY MEDICINE

## 2020-07-21 PROCEDURE — 3288F FALL RISK ASSESSMENT DOCD: CPT | Performed by: FAMILY MEDICINE

## 2020-07-21 PROCEDURE — 3074F SYST BP LT 130 MM HG: CPT | Performed by: FAMILY MEDICINE

## 2020-07-21 PROCEDURE — 90471 IMMUNIZATION ADMIN: CPT | Performed by: FAMILY MEDICINE

## 2020-07-21 PROCEDURE — 4040F PNEUMOC VAC/ADMIN/RCVD: CPT | Performed by: FAMILY MEDICINE

## 2020-07-21 PROCEDURE — 99397 PER PM REEVAL EST PAT 65+ YR: CPT | Performed by: FAMILY MEDICINE

## 2020-07-21 PROCEDURE — 3078F DIAST BP <80 MM HG: CPT | Performed by: FAMILY MEDICINE

## 2020-07-21 PROCEDURE — 90732 PPSV23 VACC 2 YRS+ SUBQ/IM: CPT | Performed by: FAMILY MEDICINE

## 2020-07-21 RX ORDER — ROSUVASTATIN CALCIUM 10 MG/1
10 TABLET, COATED ORAL DAILY
Qty: 30 TABLET | Refills: 5 | Status: SHIPPED | OUTPATIENT
Start: 2020-07-21 | End: 2021-01-22 | Stop reason: SDUPTHER

## 2020-07-21 NOTE — PROGRESS NOTES
[address] [facility], [place], operated by [facility]    NAME: Rosario Mejia  AGE: 75 y o  SEX: male  : 1955     DATE: 2020     Assessment and Plan:     Problem List Items Addressed This Visit        Other    Nicotine dependence     Tobacco Cessation Counseling: Tobacco cessation counseling and education was provided  The patient is sincerely urged to quit consumption of tobacco  He is not ready to quit tobacco  The numerous health risks of tobacco consumption were discussed  If he decides to quit, there are a number of helpful adjunctive aids, and he can see me to discuss nicotine replacement therapy, chantix, or bupropion anytime in the future  Relevant Orders    CT lung screening program      Other Visit Diagnoses     Annual physical exam    -  Primary    Need for vaccination        Relevant Orders    PNEUMOCOCCAL POLYSACCHARIDE VACCINE 23-VALENT =>1YO SQ IM (Completed)      The patient had a normal exam today in the office  He was again strongly advised to quit smoking  He is retiring next month and is going to work on quitting  He is also going to work on improving his diet and exercise  His lipids were again markedly elevated and his calculated ASCVD risk is high at 23 2  I did advise statin therapy for him  We will be starting him on Crestor 10 mg daily  We will plan on rechecking his lipids and LFTs again in 2 months  We will see him back as scheduled in 6 months  The patient is going to be following up with his urologist and will have a full exam at that time  He is refusing the Shingrix shingles vaccine  Immunizations and preventive care screenings were discussed with patient today  Appropriate education was printed on patient's after visit summary  Counseling:  Dental Health: discussed importance of regular tooth brushing, flossing, and dental visits    Injury prevention: discussed safety/seat belts, safety helmets, smoke detectors, carbon dioxide detectors, and smoking near bedding or upholstery  · Exercise: the importance of regular exercise/physical activity was discussed  Recommend exercise 3-5 times per week for at least 30 minutes  Return in about 6 months (around 1/21/2021) for Recheck  Chief Complaint:     Chief Complaint   Patient presents with    Physical Exam     No new or ongoing issues to be addressed today per patient  History of Present Illness:     Adult Annual Physical   Patient here for a comprehensive physical exam  The patient reports no problems  Kaiden Smith is a 75 y o  male who presents today for complete physical  he   has been feeling well and has no complaints today  The patient denies any chest pain, shortness of breath, or palpitations  There is no edema  There are no headaches or visual changes  There is no lightheadedness, dizziness, or fainting spells  There are no GI symptoms  The patient goes for dental exams every 6 months and sees his eye doctor  The patient is watching his diet and follows a regular exercise program    He is going to be retiring in August 2020  Diet and Physical Activity  · Diet/Nutrition: well balanced diet, frequent junk food, low fat diet and consuming 3-5 servings of fruits/vegetables daily  · Exercise: no formal exercise  Depression Screening  PHQ-9 Depression Screening    PHQ-9:    Frequency of the following problems over the past two weeks:       Little interest or pleasure in doing things:  0 - not at all  Feeling down, depressed, or hopeless:  0 - not at all  PHQ-2 Score:  0       General Health  · Sleep: sleeps well  · Hearing: normal - bilateral   · Vision: no vision problems, goes for regular eye exams and most recent eye exam >1 year ago  · Dental: regular dental visits and brushes teeth twice daily   Health  · Symptoms include: none     Review of Systems:     Review of Systems   Constitutional: Negative  HENT: Negative  Eyes: Negative  Respiratory: Negative  Cardiovascular: Negative  Gastrointestinal: Negative  Endocrine: Negative  Genitourinary: Negative  Musculoskeletal: Negative  Skin: Negative  Allergic/Immunologic: Negative  Neurological: Negative  Hematological: Negative  Psychiatric/Behavioral: Negative  Past Medical History:     History reviewed  No pertinent past medical history  Past Surgical History:     History reviewed  No pertinent surgical history  Family History:     Family History   Problem Relation Age of Onset   [facility] Hypertension Mother     Diabetes Father     Parkinsonism Father     Heart disease Sister         coronary heart disease(CHD)    Heart attack Sister 62        myocardial infarction      Social History:     E-Cigarette/Vaping    E-Cigarette Use Never User      E-Cigarette/Vaping Substances    Nicotine No     THC No     CBD No     Flavoring No     Other No     Unknown No      Social History     Socioeconomic History    Marital status:      Spouse name: None    Number of children: 2    Years of education: None    Highest education level: None   Occupational History    Occupation: [employer]   Social Needs    Financial resource strain: Not hard at all   Andela insecurity:     Worry: Never true     Inability: Never true    Transportation needs:     Medical: No     Non-medical: No   Tobacco Use    Smoking status: Current Every Day Smoker     Types: Cigars    Smokeless tobacco: Never Used    Tobacco comment: 5 cigars a day for 30 years  Substance and Sexual Activity    Alcohol use:  Yes     Alcohol/week: 7 0 standard drinks     Types: 7 Cans of beer per week     Frequency: Monthly or less     Drinks per session: 3 or 4     Binge frequency: Less than monthly    Drug use: Never    Sexual activity: Not Currently   Lifestyle    Physical activity:     Days per week: 0 days     Minutes per session: 0 min    Stress: Not at all   Relationships    Social connections:     Talks on phone: More than three times a week     Gets together: Once a week     Attends Sikh service: Never     Active member of club or organization: No     Attends meetings of clubs or organizations: Never     Relationship status:     Intimate partner violence:     Fear of current or ex partner: No     Emotionally abused: No     Physically abused: No     Forced sexual activity: No   Other Topics Concern    None   Social History Narrative    Sendentary lifestyle    , per [employer]    Working full-time      Current Medications:     Current Outpatient Medications   Medication Sig Dispense Refill    amLODIPine (NORVASC) 5 mg tablet Take 1 tablet (5 mg total) by mouth daily 90 tablet 1    lisinopril (ZESTRIL) 40 mg tablet Take 1 tablet (40 mg total) by mouth daily 90 tablet 1    rosuvastatin (CRESTOR) 10 MG tablet Take 1 tablet (10 mg total) by mouth daily 30 tablet 5     No current facility-administered medications for this visit  Allergies:     No Known Allergies   Physical Exam:     /78   Pulse 68   Temp 98 2 °F (36 8 °C) (Tympanic)   Resp 16   Ht 5' 7" (1 702 m)   Wt 81 7 kg (180 lb 3 2 oz)   SpO2 97%   BMI 28 22 kg/m²     Physical Exam   Constitutional: He is oriented to person, place, and time  He appears well-developed and well-nourished  No distress  Eyes: Pupils are equal, round, and reactive to light  EOM are normal    Neck: Normal range of motion  Neck supple  No JVD present  No tracheal deviation present  No thyromegaly present  Cardiovascular: Normal rate, regular rhythm and normal heart sounds  Exam reveals no gallop and no friction rub  No murmur heard  Pulmonary/Chest: Effort normal and breath sounds normal  No stridor  No respiratory distress  He has no wheezes  He has no rales  He exhibits no tenderness  Abdominal: Soft  Bowel sounds are normal  He exhibits no distension and no mass  There is no tenderness  There is no rebound and no guarding  Musculoskeletal: Normal range of motion  Lymphadenopathy:     He has no cervical adenopathy  Neurological: He is alert and oriented to person, place, and time  He has normal reflexes  He displays normal reflexes  No cranial nerve deficit  He exhibits normal muscle tone  Coordination normal    Skin: Skin is warm and dry  No rash noted  He is not diaphoretic  No erythema  No pallor  Nursing note and vitals reviewed        Kavon Mejias DO   [address] [facility]

## 2020-07-21 NOTE — PATIENT INSTRUCTIONS

## 2020-07-23 DIAGNOSIS — I10 ESSENTIAL HYPERTENSION: ICD-10-CM

## 2020-07-23 RX ORDER — LISINOPRIL 40 MG/1
TABLET ORAL
Qty: 90 TABLET | Refills: 1 | Status: SHIPPED | OUTPATIENT
Start: 2020-07-23 | End: 2021-01-22 | Stop reason: SDUPTHER

## 2020-07-23 RX ORDER — AMLODIPINE BESYLATE 5 MG/1
TABLET ORAL
Qty: 90 TABLET | Refills: 1 | Status: SHIPPED | OUTPATIENT
Start: 2020-07-23 | End: 2021-01-22 | Stop reason: SDUPTHER

## 2020-08-27 LAB
ALBUMIN SERPL-MCNC: 4.5 G/DL (ref 3.8–4.8)
ALBUMIN/GLOB SERPL: 2 {RATIO} (ref 1.2–2.2)
ALP SERPL-CCNC: 46 IU/L (ref 39–117)
ALT SERPL-CCNC: 29 IU/L (ref 0–44)
AST SERPL-CCNC: 25 IU/L (ref 0–40)
BILIRUB SERPL-MCNC: 0.3 MG/DL (ref 0–1.2)
BUN SERPL-MCNC: 19 MG/DL (ref 8–27)
BUN/CREAT SERPL: 21 (ref 10–24)
CALCIUM SERPL-MCNC: 9.1 MG/DL (ref 8.6–10.2)
CHLORIDE SERPL-SCNC: 103 MMOL/L (ref 96–106)
CHOLEST SERPL-MCNC: 156 MG/DL (ref 100–199)
CHOLEST/HDLC SERPL: 3.9 RATIO (ref 0–5)
CO2 SERPL-SCNC: 23 MMOL/L (ref 20–29)
CREAT SERPL-MCNC: 0.9 MG/DL (ref 0.76–1.27)
GLOBULIN SER-MCNC: 2.2 G/DL (ref 1.5–4.5)
GLUCOSE SERPL-MCNC: 101 MG/DL (ref 65–99)
HDLC SERPL-MCNC: 40 MG/DL
LDLC SERPL CALC-MCNC: 89 MG/DL (ref 0–99)
LDLC SERPL DIRECT ASSAY-MCNC: 88 MG/DL (ref 0–99)
POTASSIUM SERPL-SCNC: 4.6 MMOL/L (ref 3.5–5.2)
PROT SERPL-MCNC: 6.7 G/DL (ref 6–8.5)
SL AMB EGFR AFRICAN AMERICAN: 103 ML/MIN/1.73
SL AMB EGFR NON AFRICAN AMERICAN: 89 ML/MIN/1.73
SL AMB VLDL CHOLESTEROL CALC: 27 MG/DL (ref 5–40)
SODIUM SERPL-SCNC: 138 MMOL/L (ref 134–144)
TRIGL SERPL-MCNC: 135 MG/DL (ref 0–149)

## 2020-09-12 DIAGNOSIS — E78.2 MIXED HYPERLIPIDEMIA: ICD-10-CM

## 2020-09-12 RX ORDER — ROSUVASTATIN CALCIUM 10 MG/1
TABLET, COATED ORAL
Qty: 90 TABLET | Refills: 2 | OUTPATIENT
Start: 2020-09-12

## 2021-01-10 LAB — EXT SARS-COV-2: NEGATIVE

## 2021-01-15 DIAGNOSIS — I10 ESSENTIAL HYPERTENSION: ICD-10-CM

## 2021-01-15 RX ORDER — AMLODIPINE BESYLATE 5 MG/1
TABLET ORAL
Qty: 90 TABLET | Refills: 1 | OUTPATIENT
Start: 2021-01-15

## 2021-01-15 RX ORDER — LISINOPRIL 40 MG/1
TABLET ORAL
Qty: 90 TABLET | Refills: 1 | OUTPATIENT
Start: 2021-01-15

## 2021-01-22 ENCOUNTER — OFFICE VISIT (OUTPATIENT)
Dept: FAMILY MEDICINE CLINIC | Facility: CLINIC | Age: 66
End: 2021-01-22
Payer: COMMERCIAL

## 2021-01-22 VITALS
BODY MASS INDEX: 30.42 KG/M2 | OXYGEN SATURATION: 100 % | SYSTOLIC BLOOD PRESSURE: 126 MMHG | DIASTOLIC BLOOD PRESSURE: 82 MMHG | WEIGHT: 193.8 LBS | TEMPERATURE: 98.2 F | RESPIRATION RATE: 16 BRPM | HEART RATE: 69 BPM | HEIGHT: 67 IN

## 2021-01-22 DIAGNOSIS — E78.2 MIXED HYPERLIPIDEMIA: ICD-10-CM

## 2021-01-22 DIAGNOSIS — I10 ESSENTIAL HYPERTENSION: Primary | ICD-10-CM

## 2021-01-22 DIAGNOSIS — F17.290 OTHER TOBACCO PRODUCT NICOTINE DEPENDENCE, UNCOMPLICATED: ICD-10-CM

## 2021-01-22 PROCEDURE — 99214 OFFICE O/P EST MOD 30 MIN: CPT | Performed by: FAMILY MEDICINE

## 2021-01-22 PROCEDURE — 1160F RVW MEDS BY RX/DR IN RCRD: CPT | Performed by: FAMILY MEDICINE

## 2021-01-22 PROCEDURE — 3008F BODY MASS INDEX DOCD: CPT | Performed by: FAMILY MEDICINE

## 2021-01-22 PROCEDURE — 4004F PT TOBACCO SCREEN RCVD TLK: CPT | Performed by: FAMILY MEDICINE

## 2021-01-22 PROCEDURE — 3074F SYST BP LT 130 MM HG: CPT | Performed by: FAMILY MEDICINE

## 2021-01-22 PROCEDURE — 3079F DIAST BP 80-89 MM HG: CPT | Performed by: FAMILY MEDICINE

## 2021-01-22 PROCEDURE — 3725F SCREEN DEPRESSION PERFORMED: CPT | Performed by: FAMILY MEDICINE

## 2021-01-22 RX ORDER — LISINOPRIL 40 MG/1
40 TABLET ORAL DAILY
Qty: 90 TABLET | Refills: 1 | Status: SHIPPED | OUTPATIENT
Start: 2021-01-22 | End: 2021-07-29 | Stop reason: SDUPTHER

## 2021-01-22 RX ORDER — ROSUVASTATIN CALCIUM 10 MG/1
10 TABLET, COATED ORAL DAILY
Qty: 30 TABLET | Refills: 5 | Status: SHIPPED | OUTPATIENT
Start: 2021-01-22 | End: 2021-07-29 | Stop reason: SDUPTHER

## 2021-01-22 RX ORDER — AMLODIPINE BESYLATE 5 MG/1
5 TABLET ORAL DAILY
Qty: 90 TABLET | Refills: 1 | Status: SHIPPED | OUTPATIENT
Start: 2021-01-22 | End: 2021-07-29 | Stop reason: SDUPTHER

## 2021-01-22 NOTE — PATIENT INSTRUCTIONS
Tomi piña    We are committed to getting you vaccinated as soon as possible and will be closely following CDC and [facility]  guidelines as they are released and revised  Please refer to our COVID-19 vaccine webpage for the most up to date information on the vaccine and our distribution efforts

## 2021-01-25 NOTE — ASSESSMENT & PLAN NOTE
The patient's blood pressure is well controlled with his current medication  We have made no changes today  He will continue to work on his healthy diet and exercise  We will see him back in the office as scheduled

## 2021-03-04 DIAGNOSIS — Z23 ENCOUNTER FOR IMMUNIZATION: ICD-10-CM

## 2021-03-25 ENCOUNTER — IMMUNIZATIONS (OUTPATIENT)
Dept: FAMILY MEDICINE CLINIC | Facility: HOSPITAL | Age: 66
End: 2021-03-25

## 2021-03-25 DIAGNOSIS — Z23 ENCOUNTER FOR IMMUNIZATION: Primary | ICD-10-CM

## 2021-03-25 PROCEDURE — 0001A SARS-COV-2 / COVID-19 MRNA VACCINE (PFIZER-BIONTECH) 30 MCG: CPT

## 2021-03-25 PROCEDURE — 91300 SARS-COV-2 / COVID-19 MRNA VACCINE (PFIZER-BIONTECH) 30 MCG: CPT

## 2021-04-15 ENCOUNTER — IMMUNIZATIONS (OUTPATIENT)
Dept: FAMILY MEDICINE CLINIC | Facility: HOSPITAL | Age: 66
End: 2021-04-15

## 2021-04-15 DIAGNOSIS — Z23 ENCOUNTER FOR IMMUNIZATION: Primary | ICD-10-CM

## 2021-04-15 PROCEDURE — 0002A SARS-COV-2 / COVID-19 MRNA VACCINE (PFIZER-BIONTECH) 30 MCG: CPT

## 2021-04-15 PROCEDURE — 91300 SARS-COV-2 / COVID-19 MRNA VACCINE (PFIZER-BIONTECH) 30 MCG: CPT

## 2021-06-25 LAB
ALBUMIN SERPL-MCNC: 4.5 G/DL (ref 3.8–4.8)
ALBUMIN/GLOB SERPL: 1.7 {RATIO} (ref 1.2–2.2)
ALP SERPL-CCNC: 51 IU/L (ref 48–121)
ALT SERPL-CCNC: 33 IU/L (ref 0–44)
APPEARANCE UR: CLEAR
AST SERPL-CCNC: 24 IU/L (ref 0–40)
BASOPHILS # BLD AUTO: 0.1 X10E3/UL (ref 0–0.2)
BASOPHILS NFR BLD AUTO: 1 %
BILIRUB SERPL-MCNC: 0.4 MG/DL (ref 0–1.2)
BILIRUB UR QL STRIP: NEGATIVE
BUN SERPL-MCNC: 20 MG/DL (ref 8–27)
BUN/CREAT SERPL: 22 (ref 10–24)
CALCIUM SERPL-MCNC: 9.5 MG/DL (ref 8.6–10.2)
CHLORIDE SERPL-SCNC: 99 MMOL/L (ref 96–106)
CHOLEST SERPL-MCNC: 167 MG/DL (ref 100–199)
CHOLEST/HDLC SERPL: 4.8 RATIO (ref 0–5)
CO2 SERPL-SCNC: 25 MMOL/L (ref 20–29)
COLOR UR: YELLOW
CREAT SERPL-MCNC: 0.93 MG/DL (ref 0.76–1.27)
EOSINOPHIL # BLD AUTO: 0.1 X10E3/UL (ref 0–0.4)
EOSINOPHIL NFR BLD AUTO: 2 %
ERYTHROCYTE [DISTWIDTH] IN BLOOD BY AUTOMATED COUNT: 13.3 % (ref 11.6–15.4)
GLOBULIN SER-MCNC: 2.6 G/DL (ref 1.5–4.5)
GLUCOSE SERPL-MCNC: 107 MG/DL (ref 65–99)
GLUCOSE UR QL: NEGATIVE
HCT VFR BLD AUTO: 43.5 % (ref 37.5–51)
HDLC SERPL-MCNC: 35 MG/DL
HGB BLD-MCNC: 14.7 G/DL (ref 13–17.7)
HGB UR QL STRIP: NEGATIVE
IMM GRANULOCYTES # BLD: 0.1 X10E3/UL (ref 0–0.1)
IMM GRANULOCYTES NFR BLD: 1 %
KETONES UR QL STRIP: NEGATIVE
LDLC SERPL CALC-MCNC: 105 MG/DL (ref 0–99)
LDLC SERPL DIRECT ASSAY-MCNC: 95 MG/DL (ref 0–99)
LEUKOCYTE ESTERASE UR QL STRIP: NEGATIVE
LYMPHOCYTES # BLD AUTO: 3 X10E3/UL (ref 0.7–3.1)
LYMPHOCYTES NFR BLD AUTO: 37 %
MCH RBC QN AUTO: 31.4 PG (ref 26.6–33)
MCHC RBC AUTO-ENTMCNC: 33.8 G/DL (ref 31.5–35.7)
MCV RBC AUTO: 93 FL (ref 79–97)
MICRO URNS: NORMAL
MONOCYTES # BLD AUTO: 0.9 X10E3/UL (ref 0.1–0.9)
MONOCYTES NFR BLD AUTO: 12 %
NEUTROPHILS # BLD AUTO: 3.9 X10E3/UL (ref 1.4–7)
NEUTROPHILS NFR BLD AUTO: 47 %
NITRITE UR QL STRIP: NEGATIVE
PH UR STRIP: 6 [PH] (ref 5–7.5)
PLATELET # BLD AUTO: 283 X10E3/UL (ref 150–450)
POTASSIUM SERPL-SCNC: 5 MMOL/L (ref 3.5–5.2)
PROT SERPL-MCNC: 7.1 G/DL (ref 6–8.5)
PROT UR QL STRIP: NEGATIVE
RBC # BLD AUTO: 4.68 X10E6/UL (ref 4.14–5.8)
SL AMB EGFR AFRICAN AMERICAN: 98 ML/MIN/1.73
SL AMB EGFR NON AFRICAN AMERICAN: 85 ML/MIN/1.73
SL AMB VLDL CHOLESTEROL CALC: 27 MG/DL (ref 5–40)
SODIUM SERPL-SCNC: 137 MMOL/L (ref 134–144)
SP GR UR: 1.01 (ref 1–1.03)
TRIGL SERPL-MCNC: 150 MG/DL (ref 0–149)
TSH SERPL DL<=0.005 MIU/L-ACNC: 1.61 UIU/ML (ref 0.45–4.5)
UROBILINOGEN UR STRIP-ACNC: 0.2 MG/DL (ref 0.2–1)
WBC # BLD AUTO: 8.1 X10E3/UL (ref 3.4–10.8)

## 2021-07-29 ENCOUNTER — OFFICE VISIT (OUTPATIENT)
Dept: FAMILY MEDICINE CLINIC | Facility: CLINIC | Age: 66
End: 2021-07-29
Payer: COMMERCIAL

## 2021-07-29 VITALS
HEART RATE: 68 BPM | BODY MASS INDEX: 30.37 KG/M2 | TEMPERATURE: 96.7 F | WEIGHT: 189 LBS | RESPIRATION RATE: 18 BRPM | HEIGHT: 66 IN | OXYGEN SATURATION: 99 % | DIASTOLIC BLOOD PRESSURE: 70 MMHG | SYSTOLIC BLOOD PRESSURE: 120 MMHG

## 2021-07-29 DIAGNOSIS — Z00.00 WELCOME TO MEDICARE PREVENTIVE VISIT: Primary | ICD-10-CM

## 2021-07-29 DIAGNOSIS — Z13.6 ENCOUNTER FOR SCREENING FOR CARDIOVASCULAR DISORDERS: ICD-10-CM

## 2021-07-29 DIAGNOSIS — I10 ESSENTIAL HYPERTENSION: ICD-10-CM

## 2021-07-29 DIAGNOSIS — E78.2 MIXED HYPERLIPIDEMIA: ICD-10-CM

## 2021-07-29 DIAGNOSIS — Z23 NEED FOR VACCINATION: ICD-10-CM

## 2021-07-29 PROCEDURE — G0402 INITIAL PREVENTIVE EXAM: HCPCS | Performed by: FAMILY MEDICINE

## 2021-07-29 PROCEDURE — 4004F PT TOBACCO SCREEN RCVD TLK: CPT | Performed by: FAMILY MEDICINE

## 2021-07-29 PROCEDURE — 3008F BODY MASS INDEX DOCD: CPT | Performed by: FAMILY MEDICINE

## 2021-07-29 PROCEDURE — 3288F FALL RISK ASSESSMENT DOCD: CPT | Performed by: FAMILY MEDICINE

## 2021-07-29 PROCEDURE — 1125F AMNT PAIN NOTED PAIN PRSNT: CPT | Performed by: FAMILY MEDICINE

## 2021-07-29 PROCEDURE — 1101F PT FALLS ASSESS-DOCD LE1/YR: CPT | Performed by: FAMILY MEDICINE

## 2021-07-29 PROCEDURE — 3074F SYST BP LT 130 MM HG: CPT | Performed by: FAMILY MEDICINE

## 2021-07-29 PROCEDURE — 1160F RVW MEDS BY RX/DR IN RCRD: CPT | Performed by: FAMILY MEDICINE

## 2021-07-29 PROCEDURE — 3725F SCREEN DEPRESSION PERFORMED: CPT | Performed by: FAMILY MEDICINE

## 2021-07-29 PROCEDURE — 1170F FXNL STATUS ASSESSED: CPT | Performed by: FAMILY MEDICINE

## 2021-07-29 PROCEDURE — 3078F DIAST BP <80 MM HG: CPT | Performed by: FAMILY MEDICINE

## 2021-07-29 RX ORDER — ZOSTER VACCINE RECOMBINANT, ADJUVANTED 50 MCG/0.5
KIT INTRAMUSCULAR
Qty: 1 EACH | Refills: 1 | Status: SHIPPED | OUTPATIENT
Start: 2021-07-29 | End: 2022-08-03 | Stop reason: ALTCHOICE

## 2021-07-29 RX ORDER — LISINOPRIL 40 MG/1
40 TABLET ORAL DAILY
Qty: 90 TABLET | Refills: 1 | Status: SHIPPED | OUTPATIENT
Start: 2021-07-29 | End: 2022-01-27 | Stop reason: SDUPTHER

## 2021-07-29 RX ORDER — AMLODIPINE BESYLATE 5 MG/1
5 TABLET ORAL DAILY
Qty: 90 TABLET | Refills: 1 | Status: SHIPPED | OUTPATIENT
Start: 2021-07-29 | End: 2022-01-27 | Stop reason: SDUPTHER

## 2021-07-29 RX ORDER — ROSUVASTATIN CALCIUM 10 MG/1
10 TABLET, COATED ORAL DAILY
Qty: 90 TABLET | Refills: 1 | Status: SHIPPED | OUTPATIENT
Start: 2021-07-29 | End: 2022-01-27 | Stop reason: SDUPTHER

## 2021-07-29 NOTE — PATIENT INSTRUCTIONS
Medicare Preventive Visit Patient Instructions  Thank you for completing your Welcome to Medicare Visit or Medicare Annual Wellness Visit today  Your next wellness visit will be due in one year (7/30/2022)  The screening/preventive services that you may require over the next 5-10 years are detailed below  Some tests may not apply to you based off risk factors and/or age  Screening tests ordered at today's visit but not completed yet may show as past due  Also, please note that scanned in results may not display below  Preventive Screenings:  Service Recommendations Previous Testing/Comments   Colorectal Cancer Screening  · Colonoscopy    · Fecal Occult Blood Test (FOBT)/Fecal Immunochemical Test (FIT)  · Fecal DNA/Cologuard Test  · Flexible Sigmoidoscopy Age: 54-65 years old   Colonoscopy: every 10 years (May be performed more frequently if at higher risk)  OR  FOBT/FIT: every 1 year  OR  Cologuard: every 3 years  OR  Sigmoidoscopy: every 5 years  Screening may be recommended earlier than age 48 if at higher risk for colorectal cancer  Also, an individualized decision between you and your healthcare provider will decide whether screening between the ages of 74-80 would be appropriate   Colonoscopy: 11/28/2011  FOBT/FIT: Not on file  Cologuard: Not on file  Sigmoidoscopy: Not on file          Prostate Cancer Screening Individualized decision between patient and health care provider in men between ages of 53-78   Medicare will cover every 12 months beginning on the day after your 50th birthday PSA: No results in last 5 years           Hepatitis C Screening Once for adults born between 1945 and 1965  More frequently in patients at high risk for Hepatitis C Hep C Antibody: 05/29/2019        Diabetes Screening 1-2 times per year if you're at risk for diabetes or have pre-diabetes Fasting glucose: No results in last 5 years   A1C: No results in last 5 years        Cholesterol Screening Once every 5 years if you don't have a lipid disorder  May order more often based on risk factors  Lipid panel: 06/24/2021           Other Preventive Screenings Covered by Medicare:  1  Abdominal Aortic Aneurysm (AAA) Screening: covered once if your at risk  You're considered to be at risk if you have a family history of AAA or a male between the age of 73-68 who smoking at least 100 cigarettes in your lifetime  2  Lung Cancer Screening: covers low dose CT scan once per year if you meet all of the following conditions: (1) Age 50-69; (2) No signs or symptoms of lung cancer; (3) Current smoker or have quit smoking within the last 15 years; (4) You have a tobacco smoking history of at least 30 pack years (packs per day x number of years you smoked); (5) You get a written order from a healthcare provider  3  Glaucoma Screening: covered annually if you're considered high risk: (1) You have diabetes OR (2) Family history of glaucoma OR (3)  aged 48 and older OR (3)  American aged 72 and older  3  Osteoporosis Screening: covered every 2 years if you meet one of the following conditions: (1) Have a vertebral abnormality; (2) On glucocorticoid therapy for more than 3 months; (3) Have primary hyperparathyroidism; (4) On osteoporosis medications and need to assess response to drug therapy  5  HIV Screening: covered annually if you're between the age of 12-76  Also covered annually if you are younger than 13 and older than 72 with risk factors for HIV infection  For pregnant patients, it is covered up to 3 times per pregnancy      Immunizations:  Immunization Recommendations   Influenza Vaccine Annual influenza vaccination during flu season is recommended for all persons aged >= 6 months who do not have contraindications   Pneumococcal Vaccine (Prevnar and Pneumovax)  * Prevnar = PCV13  * Pneumovax = PPSV23 Adults 25-60 years old: 1-3 doses may be recommended based on certain risk factors  Adults 72 years old: Prevnar (PCV13) vaccine recommended followed by Pneumovax (PPSV23) vaccine  If already received PPSV23 since turning 65, then PCV13 recommended at least one year after PPSV23 dose  Hepatitis B Vaccine 3 dose series if at intermediate or high risk (ex: diabetes, end stage renal disease, liver disease)   Tetanus (Td) Vaccine - COST NOT COVERED BY MEDICARE PART B Following completion of primary series, a booster dose should be given every 10 years to maintain immunity against tetanus  Td may also be given as tetanus wound prophylaxis  Tdap Vaccine - COST NOT COVERED BY MEDICARE PART B Recommended at least once for all adults  For pregnant patients, recommended with each pregnancy  Shingles Vaccine (Shingrix) - COST NOT COVERED BY MEDICARE PART B  2 shot series recommended in those aged 48 and above     Health Maintenance Due:      Topic Date Due    Colorectal Cancer Screening  11/28/2016    Hepatitis C Screening  Completed     Immunizations Due:      Topic Date Due    Influenza Vaccine (1) 09/01/2021     Advance Directives   What are advance directives? Advance directives are legal documents that state your wishes and plans for medical care  These plans are made ahead of time in case you lose your ability to make decisions for yourself  Advance directives can apply to any medical decision, such as the treatments you want, and if you want to donate organs  What are the types of advance directives? There are many types of advance directives, and each state has rules about how to use them  You may choose a combination of any of the following:  · Living will: This is a written record of the treatment you want  You can also choose which treatments you do not want, which to limit, and which to stop at a certain time  This includes surgery, medicine, IV fluid, and tube feedings  · Durable power of  for healthcare [place] [facility]):   This is a written record that states who you want to make healthcare choices for you when you are unable to make them for yourself  This person, called a proxy, is usually a family member or a friend  You may choose more than 1 proxy  · Do not resuscitate (DNR) order:  A DNR order is used in case your heart stops beating or you stop breathing  It is a request not to have certain forms of treatment, such as CPR  A DNR order may be included in other types of advance directives  · Medical directive: This covers the care that you want if you are in a coma, near death, or unable to make decisions for yourself  You can list the treatments you want for each condition  Treatment may include pain medicine, surgery, blood transfusions, dialysis, IV or tube feedings, and a ventilator (breathing machine)  · Values history: This document has questions about your views, beliefs, and how you feel and think about life  This information can help others choose the care that you would choose  Why are advance directives important? An advance directive helps you control your care  Although spoken wishes may be used, it is better to have your wishes written down  Spoken wishes can be misunderstood, or not followed  Treatments may be given even if you do not want them  An advance directive may make it easier for your family to make difficult choices about your care  Cigarette Smoking and Your Health   Risks to your health if you smoke:  Nicotine and other chemicals found in tobacco damage every cell in your body  Even if you are a light smoker, you have an increased risk for cancer, heart disease, and lung disease  If you are pregnant or have diabetes, smoking increases your risk for complications  Benefits to your health if you stop smoking:   · You decrease respiratory symptoms such as coughing, wheezing, and shortness of breath  · You reduce your risk for cancers of the lung, mouth, throat, kidney, bladder, pancreas, stomach, and cervix  If you already have cancer, you increase the benefits of chemotherapy   You also reduce your risk for cancer returning or a second cancer from developing  · You reduce your risk for heart disease, blood clots, heart attack, and stroke  · You reduce your risk for lung infections, and diseases such as pneumonia, asthma, chronic bronchitis, and emphysema  · Your circulation improves  More oxygen can be delivered to your body  If you have diabetes, you lower your risk for complications, such as kidney, artery, and eye diseases  You also lower your risk for nerve damage  Nerve damage can lead to amputations, poor vision, and blindness  · You improve your body's ability to heal and to fight infections  For more information and support to stop smoking:   · [employer]  Phone: 5- 885 - [phone]  Web Address: An[place]  Weight Management   Why it is important to manage your weight:  Being overweight increases your risk of health conditions such as heart disease, high blood pressure, type 2 diabetes, and certain types of cancer  It can also increase your risk for osteoarthritis, sleep apnea, and other respiratory problems  Aim for a slow, steady weight loss  Even a small amount of weight loss can lower your risk of health problems  How to lose weight safely:  A safe and healthy way to lose weight is to eat fewer calories and get regular exercise  You can lose up about 1 pound a week by decreasing the number of calories you eat by 500 calories each day  Healthy meal plan for weight management:  A healthy meal plan includes a variety of foods, contains fewer calories, and helps you stay healthy  A healthy meal plan includes the following:  · Eat whole-grain foods more often  A healthy meal plan should contain fiber  Fiber is the part of grains, fruits, and vegetables that is not broken down by your body  Whole-grain foods are healthy and provide extra fiber in your diet  Some examples of whole-grain foods are whole-wheat breads and pastas, oatmeal, brown rice, and bulgur    · Eat a variety of vegetables every day  Include dark, leafy greens such as spinach, kale, polo greens, and mustard greens  Eat yellow and orange vegetables such as carrots, sweet potatoes, and winter squash  · Eat a variety of fruits every day  Choose fresh or canned fruit (canned in its own juice or light syrup) instead of juice  Fruit juice has very little or no fiber  · Eat low-fat dairy foods  Drink fat-free (skim) milk or 1% milk  Eat fat-free yogurt and low-fat cottage cheese  Try low-fat cheeses such as mozzarella and other reduced-fat cheeses  · Choose meat and other protein foods that are low in fat  Choose beans or other legumes such as split peas or lentils  Choose fish, skinless poultry (chicken or turkey), or lean cuts of red meat (beef or pork)  Before you cook meat or poultry, cut off any visible fat  · Use less fat and oil  Try baking foods instead of frying them  Add less fat, such as margarine, sour cream, regular salad dressing and mayonnaise to foods  Eat fewer high-fat foods  Some examples of high-fat foods include french fries, doughnuts, ice cream, and cakes  · Eat fewer sweets  Limit foods and drinks that are high in sugar  This includes candy, cookies, regular soda, and sweetened drinks  Exercise:  Exercise at least 30 minutes per day on most days of the week  Some examples of exercise include walking, biking, dancing, and swimming  You can also fit in more physical activity by taking the stairs instead of the elevator or parking farther away from stores  Ask your healthcare provider about the best exercise plan for you  © Copyright [employer] 2018 Information is for End User's use only and may not be sold, redistributed or otherwise used for commercial purposes   All illustrations and images included in CareNotes® are the copyrighted property of A D A M , [employer]  or [address] [place] [address] [employer]

## 2021-07-29 NOTE — PROGRESS NOTES
Assessment and Plan:     Problem List Items Addressed This Visit        Cardiovascular and Mediastinum    Essential hypertension    Relevant Medications    lisinopril (ZESTRIL) 40 mg tablet    amLODIPine (NORVASC) 5 mg tablet    rosuvastatin (CRESTOR) 10 MG tablet       Other    Hyperlipidemia, unspecified    Relevant Medications    rosuvastatin (CRESTOR) 10 MG tablet      Other Visit Diagnoses     Welcome to Medicare preventive visit    -  Primary    Encounter for screening for cardiovascular disorders        Relevant Orders    POCT ECG (Completed)    Ambulatory referral for colonoscopy    Need for vaccination        Relevant Medications    Zoster Vac Recomb Adjuvanted (Shingrix) 50 MCG/0 5ML SUSR      The patient is stable on his current medications  We have made no changes today  We will see him back in the office as scheduled  Preventive health issues were discussed with patient, and age appropriate screening tests were ordered as noted in patient's After Visit Summary  Personalized health advice and appropriate referrals for health education or preventive services given if needed, as noted in patient's After Visit Summary  History of Present Illness:     Patient presents for Welcome to Medicare visit  Ciara Muller is a 77 y o  male who presents today for a complete physical  he   has been feeling well and has no complaints today  The patient denies any chest pain, shortness of breath, or palpitations  There is no edema  There are no headaches or visual changes  There is no lightheadedness, dizziness, or fainting spells  There are no GI symptoms  The patient goes for dental exams every 6 months and sees his eye doctor  The patient is watching his diet and follows a regular exercise program    The patient currently denies any nausea, vomiting, or GERD symptoms  he has normal bowel movements and normal urine output  he has a normal appetite    He continues to see his urologist regularly  Patient Care Team:  Arjun Esqueda DO as PCP - General  Amarilis Sanderson MD (Urology)     Review of Systems:     Review of Systems   Constitutional: Negative  HENT: Negative  Eyes: Negative  Respiratory: Negative  Cardiovascular: Negative  Gastrointestinal: Negative  Endocrine: Negative  Genitourinary: Negative  Musculoskeletal: Negative  Skin: Negative  Allergic/Immunologic: Negative  Neurological: Negative  Hematological: Negative  Psychiatric/Behavioral: Negative  Problem List:     Patient Active Problem List   Diagnosis    Benign localized hyperplasia of prostate    Essential hypertension    Hyperlipidemia, unspecified    Nicotine dependence    Nummular dermatitis      Past Medical and Surgical History:     History reviewed  No pertinent past medical history  History reviewed  No pertinent surgical history  Family History:     Family History   Problem Relation Age of Onset    Hypertension Mother     Diabetes Father     Parkinsonism Father     Heart disease Sister         coronary heart disease(CHD)    Heart attack Sister 62        myocardial infarction      Social History:     Social History     Socioeconomic History    Marital status:      Spouse name: None    Number of children: 2    Years of education: None    Highest education level: None   Occupational History    Occupation: [employer]   Tobacco Use    Smoking status: Current Every Day Smoker     Types: Cigars    Smokeless tobacco: Never Used    Tobacco comment: 5 cigars a day for 30 years  Vaping Use    Vaping Use: Never used   Substance and Sexual Activity    Alcohol use:  Yes     Alcohol/week: 7 0 standard drinks     Types: 7 Cans of beer per week    Drug use: Never    Sexual activity: Not Currently   Other Topics Concern    None   Social History Narrative    Sendentary lifestyle    , per [employer]    Working full-time     Social Determinants of Health     Financial Resource Strain: Low Risk     Difficulty of Paying Living Expenses: Not hard at all   Food Insecurity: No Food Insecurity    Worried About Running Out of Food in the Last Year: Never true    Shari of Food in the Last Year: Never true   Transportation Needs: No Transportation Needs    Lack of Transportation (Medical): No    Lack of Transportation (Non-Medical): No   Physical Activity: Inactive    Days of Exercise per Week: 0 days    Minutes of Exercise per Session: 0 min   Stress: No Stress Concern Present    Feeling of Stress : Not at all   Social Connections: Socially Isolated    Frequency of Communication with Friends and Family: More than three times a week    Frequency of Social Gatherings with Friends and Family: Once a week    Attends Anabaptism Services: Never    Active Member of Clubs or Organizations: No    Attends Club or Organization Meetings: Never    Marital Status:    Intimate Partner Violence: Not At Risk    Fear of Current or Ex-Partner: No    Emotionally Abused: No    Physically Abused: No    Sexually Abused: No      Medications and Allergies:     Current Outpatient Medications   Medication Sig Dispense Refill    amLODIPine (NORVASC) 5 mg tablet Take 1 tablet (5 mg total) by mouth daily 90 tablet 1    lisinopril (ZESTRIL) 40 mg tablet Take 1 tablet (40 mg total) by mouth daily 90 tablet 1    rosuvastatin (CRESTOR) 10 MG tablet Take 1 tablet (10 mg total) by mouth daily 90 tablet 1    Zoster Vac Recomb Adjuvanted (Shingrix) 50 MCG/0 5ML SUSR Administer 1 dose intramuscularly now and repeat for a 2nd dose in 2-6 months 1 each 1     No current facility-administered medications for this visit       No Known Allergies   Immunizations:     Immunization History   Administered Date(s) Administered    Pneumococcal Conjugate 13-Valent 03/18/2019    Pneumococcal Polysaccharide PPV23 05/27/2014, 07/21/2020    SARS-CoV-2 / COVID-19 mRNA IM (Pfizer-BioNTech) 03/25/2021, 04/15/2021    Tdap 07/18/2019      Health Maintenance:         Topic Date Due    Colorectal Cancer Screening  11/28/2016    Hepatitis C Screening  Completed         Topic Date Due    Influenza Vaccine (1) 09/01/2021      Medicare Screening Tests and Risk Assessments:     Adriane Shepherd is here for his Welcome to Medicare visit  Health Risk Assessment:   Patient rates overall health as very good  Patient feels that their physical health rating is same  Patient is satisfied with their life  Eyesight was rated as same  Hearing was rated as same  Patient feels that their emotional and mental health rating is same  Patients states they are never, rarely angry  Patient states they are sometimes unusually tired/fatigued  Pain experienced in the last 7 days has been none  Depression Screening:   PHQ-2 Score: 0      Fall Risk Screening: In the past year, patient has experienced: no history of falling in past year      Home Safety:  Patient does not have trouble with stairs inside or outside of their home  Patient has working smoke alarms and has working carbon monoxide detector  Home safety hazards include: none  Nutrition:   Current diet is Regular, Limited junk food and Low Carb  Medications:   Patient is currently taking over-the-counter supplements  OTC medications include: see medication list  Patient is able to manage medications  Activities of Daily Living (ADLs)/Instrumental Activities of Daily Living (IADLs):   Walk and transfer into and out of bed and chair?: Yes  Dress and groom yourself?: Yes    Bathe or shower yourself?: Yes    Feed yourself? Yes  Do your laundry/housekeeping?: Yes  Manage your money, pay your bills and track your expenses?: Yes  Make your own meals?: Yes    Do your own shopping?: Yes    Previous Hospitalizations:   Any hospitalizations or ED visits within the last 12 months?: No      Advance Care Planning:   Living will: No    Durable POA for healthcare:  No Advanced directive: No    Advanced directive counseling given: Yes    Five wishes given: Yes    Patient declined ACP directive: No    End of Life Decisions reviewed with patient: No    Provider agrees with end of life decisions: No      Cognitive Screening:   Provider or family/friend/caregiver concerned regarding cognition?: No    PREVENTIVE SCREENINGS      Cardiovascular Screening:    General: Screening Not Indicated and History Lipid Disorder      Diabetes Screening:     General: Screening Current      Colorectal Cancer Screening:     General: Screening Current      Prostate Cancer Screening:    General: Risks and Benefits Discussed and Screening Current      Osteoporosis Screening:    General: Screening Not Indicated      Abdominal Aortic Aneurysm (AAA) Screening:    Risk factors include: age between 73-69 yo and tobacco use        General: Screening Not Indicated      Lung Cancer Screening:     General: Screening Not Indicated      Hepatitis C Screening:    General: Screening Current    Screening, Brief Intervention, and Referral to Treatment (SBIRT)    Screening  Typical number of drinks in a day: 1  Typical number of drinks in a week: 7  Interpretation: Low risk drinking behavior  AUDIT-C Screenin) How often did you have a drink containing alcohol in the past year? 2 to 3 times a week  2) How many drinks did you have on a typical day when you were drinking in the past year? 1 to 2  3) How often did you have 6 or more drinks on one occasion in the past year? never    AUDIT-C Score: 3  Interpretation: Score 0-3 (male): Negative screen for alcohol misuse    Single Item Drug Screening:  How often have you used an illegal drug (including marijuana) or a prescription medication for non-medical reasons in the past year? never    Single Item Drug Screen Score: 0  Interpretation: Negative screen for possible drug use disorder    Brief Intervention  Alcohol & drug use screenings were reviewed   No concerns regarding substance use disorder identified  Other Counseling Topics:   Car/seat belt/driving safety, skin self-exam, sunscreen and calcium and vitamin D intake and regular weightbearing exercise  Visual Acuity Screening    Right eye Left eye Both eyes   Without correction:      With correction: 20/25 20/40 20/25   Comments: Has reading glasses- needs to see the eye doctor  He needs to see them  Physical Exam:     /70 (BP Location: Right arm, Patient Position: Sitting, Cuff Size: Standard)   Pulse 68   Temp (!) 96 7 °F (35 9 °C) (Tympanic)   Resp 18   Ht 5' 6" (1 676 m)   Wt 85 7 kg (189 lb)   SpO2 99%   BMI 30 51 kg/m²     Physical Exam  Vitals and nursing note reviewed  Constitutional:       Appearance: He is well-developed  HENT:      Head: Normocephalic and atraumatic  Right Ear: External ear normal       Left Ear: External ear normal       Nose: Nose normal       Mouth/Throat:      Pharynx: No oropharyngeal exudate  Eyes:      Conjunctiva/sclera: Conjunctivae normal       Pupils: Pupils are equal, round, and reactive to light  Neck:      Thyroid: No thyromegaly  Trachea: No tracheal deviation  Cardiovascular:      Rate and Rhythm: Normal rate and regular rhythm  Heart sounds: Normal heart sounds  No murmur heard  No friction rub  No gallop  Pulmonary:      Effort: Pulmonary effort is normal  No respiratory distress  Breath sounds: Normal breath sounds  No stridor  No wheezing or rales  Chest:      Chest wall: No tenderness  Abdominal:      General: Bowel sounds are normal  There is no distension  Palpations: Abdomen is soft  There is no mass  Tenderness: There is no abdominal tenderness  There is no guarding or rebound  Hernia: No hernia is present  Genitourinary:     Penis: Normal  No tenderness  Prostate: Normal       Rectum: Normal  Guaiac result negative     Musculoskeletal:         General: No tenderness or deformity  Normal range of motion  Cervical back: Normal range of motion and neck supple  Lymphadenopathy:      Cervical: No cervical adenopathy  Skin:     General: Skin is warm and dry  Coloration: Skin is not pale  Findings: No erythema or rash  Neurological:      Mental Status: He is alert and oriented to person, place, and time  Cranial Nerves: No cranial nerve deficit  Sensory: No sensory deficit  Motor: No abnormal muscle tone  Coordination: Coordination normal       Deep Tendon Reflexes: Reflexes normal    Psychiatric:         Behavior: Behavior normal          Thought Content: Thought content normal          Judgment: Judgment normal         EKG: normal EKG, normal sinus rhythm, unchanged from previous tracings, LBBB      King Verar, DO

## 2021-07-30 PROCEDURE — G0403 EKG FOR INITIAL PREVENT EXAM: HCPCS | Performed by: FAMILY MEDICINE

## 2022-01-20 ENCOUNTER — RA CDI HCC (OUTPATIENT)
Dept: OTHER | Facility: HOSPITAL | Age: 67
End: 2022-01-20

## 2022-01-20 NOTE — PROGRESS NOTES
[facility] 73  coding opportunities       Chart reviewed, no opportunity found: CHART REVIEWED, NO OPPORTUNITY FOUND                        Patients insurance company: [employer] (Medicare Advantage and Commercial)

## 2022-01-27 ENCOUNTER — OFFICE VISIT (OUTPATIENT)
Dept: FAMILY MEDICINE CLINIC | Facility: CLINIC | Age: 67
End: 2022-01-27
Payer: COMMERCIAL

## 2022-01-27 VITALS
DIASTOLIC BLOOD PRESSURE: 78 MMHG | TEMPERATURE: 98 F | BODY MASS INDEX: 31.76 KG/M2 | OXYGEN SATURATION: 98 % | SYSTOLIC BLOOD PRESSURE: 124 MMHG | HEIGHT: 66 IN | HEART RATE: 68 BPM | RESPIRATION RATE: 18 BRPM | WEIGHT: 197.6 LBS

## 2022-01-27 DIAGNOSIS — F17.290 OTHER TOBACCO PRODUCT NICOTINE DEPENDENCE, UNCOMPLICATED: ICD-10-CM

## 2022-01-27 DIAGNOSIS — I10 ESSENTIAL HYPERTENSION: Primary | ICD-10-CM

## 2022-01-27 DIAGNOSIS — E78.2 MIXED HYPERLIPIDEMIA: ICD-10-CM

## 2022-01-27 PROCEDURE — 3078F DIAST BP <80 MM HG: CPT | Performed by: FAMILY MEDICINE

## 2022-01-27 PROCEDURE — 99214 OFFICE O/P EST MOD 30 MIN: CPT | Performed by: FAMILY MEDICINE

## 2022-01-27 PROCEDURE — 3008F BODY MASS INDEX DOCD: CPT | Performed by: FAMILY MEDICINE

## 2022-01-27 PROCEDURE — 3074F SYST BP LT 130 MM HG: CPT | Performed by: FAMILY MEDICINE

## 2022-01-27 PROCEDURE — 1160F RVW MEDS BY RX/DR IN RCRD: CPT | Performed by: FAMILY MEDICINE

## 2022-01-27 PROCEDURE — 4004F PT TOBACCO SCREEN RCVD TLK: CPT | Performed by: FAMILY MEDICINE

## 2022-01-27 PROCEDURE — 3725F SCREEN DEPRESSION PERFORMED: CPT | Performed by: FAMILY MEDICINE

## 2022-01-27 RX ORDER — ROSUVASTATIN CALCIUM 10 MG/1
10 TABLET, COATED ORAL DAILY
Qty: 90 TABLET | Refills: 1 | Status: SHIPPED | OUTPATIENT
Start: 2022-01-27 | End: 2022-07-31

## 2022-01-27 RX ORDER — AMLODIPINE BESYLATE 5 MG/1
5 TABLET ORAL DAILY
Qty: 90 TABLET | Refills: 1 | Status: SHIPPED | OUTPATIENT
Start: 2022-01-27 | End: 2022-07-31

## 2022-01-27 RX ORDER — LISINOPRIL 40 MG/1
40 TABLET ORAL DAILY
Qty: 90 TABLET | Refills: 1 | Status: SHIPPED | OUTPATIENT
Start: 2022-01-27 | End: 2022-07-31

## 2022-01-27 NOTE — PROGRESS NOTES
Assessment/Plan:  Problem List Items Addressed This Visit        Cardiovascular and Mediastinum    Essential hypertension - Primary     The patient's blood pressure is well controlled on his current medication  We have made no changes today  He will continue to work on improving his diet and exercise  We will see him back as scheduled  Relevant Medications    amLODIPine (NORVASC) 5 mg tablet    lisinopril (ZESTRIL) 40 mg tablet    rosuvastatin (CRESTOR) 10 MG tablet    Other Relevant Orders    Comprehensive metabolic panel    LDL cholesterol, direct    Lipid panel       Other    Hyperlipidemia, unspecified     The patient will continue with the current dose of the rosuvastatin  We have made no changes today  We will see him back as scheduled  Relevant Medications    rosuvastatin (CRESTOR) 10 MG tablet    Other Relevant Orders    Comprehensive metabolic panel    LDL cholesterol, direct    Lipid panel    Nicotine dependence     Tobacco Cessation Counseling: Tobacco cessation counseling and education was provided  The patient is sincerely urged to quit consumption of tobacco  He is not ready to quit tobacco  The numerous health risks of tobacco consumption were discussed  If he decides to quit, there are a number of helpful adjunctive aids, and he can see me to discuss nicotine replacement therapy, chantix, or bupropion anytime in the future  Return in about 6 months (around 7/27/2022) for Annual physical    I spent 20 minutes during the visit reviewing the history from the patient, performing the examination, discussing the findings with the patient, providing counseling and education, and making a plan  I spent 5 minutes ordering referrals and testing and documenting  Subjective:   Chief Complaint   Patient presents with    Follow-up     Routine 6 month follow up  No new issues  Pt's mom recently passed away          Patient ID: Waqar Hartmann is a 74 y o  male presents today for a routine checkup  Kailyn Myers is a 74 y o  male who presents today for a follow-up of his hypertension and  hyperlipidemia  He is tolerating his medication well  Unfortunately, he is dealing with the recent death of his mother  The patient denies any chest pain, shortness of breath, or palpitations  There is no edema  There are no headaches or visual changes  There is no lightheadedness, dizziness, or fainting spells  The patient currently denies any nausea, vomiting, or GERD symptoms  he has normal bowel movements and normal urine output  he has a normal appetite  Hypertension  The current episode started more than 1 year ago  The problem is unchanged  The problem is controlled  Pertinent negatives include no anxiety, blurred vision, chest pain, headaches, malaise/fatigue, neck pain, orthopnea, palpitations, peripheral edema, PND, shortness of breath or sweats  The current treatment provides significant improvement  There are no compliance problems  The following portions of the patient's history were reviewed and updated as appropriate: allergies, current medications, past family history, past medical history, past social history, past surgical history and problem list   Patient Active Problem List   Diagnosis    Benign localized hyperplasia of prostate    Essential hypertension    Hyperlipidemia, unspecified    Nicotine dependence    Nummular dermatitis     History reviewed  No pertinent past medical history  History reviewed  No pertinent surgical history    No Known Allergies  Family History   Problem Relation Age of Onset    Hypertension Mother     Diabetes Father     Parkinsonism Father     Heart disease Sister         coronary heart disease(CHD)    Heart attack Sister 62        myocardial infarction     Social History     Socioeconomic History    Marital status:      Spouse name: Not on file    Number of children: 2    Years of education: Not on file    Highest education level: Not on file   Occupational History    Occupation: [employer]   Tobacco Use    Smoking status: Current Every Day Smoker     Types: Cigars    Smokeless tobacco: Never Used    Tobacco comment: 5 cigars a day for 30 years  Vaping Use    Vaping Use: Never used   Substance and Sexual Activity    Alcohol use: Yes     Alcohol/week: 7 0 standard drinks     Types: 7 Cans of beer per week    Drug use: Never    Sexual activity: Not Currently   Other Topics Concern    Not on file   Social History Narrative    Sendentary lifestyle    , per [employer]    Working full-time     Social [employer]     Financial Resource Strain: Not on file   Food Insecurity: Not on file   Transportation Needs: Not on file   Physical Activity: Not on file   Stress: Not on file   Social Connections: Not on file   Intimate Partner Violence: Not At Risk    Fear of Current or Ex-Partner: No    Emotionally Abused: No    Physically Abused: No    Sexually Abused: No   Housing Stability: Not on file     Current Outpatient Medications on File Prior to Visit   Medication Sig Dispense Refill    Zoster Vac Recomb Adjuvanted (Shingrix) 50 MCG/0 5ML SUSR Administer 1 dose intramuscularly now and repeat for a 2nd dose in 2-6 months 1 each 1     No current facility-administered medications on file prior to visit  Review of Systems   Constitutional: Negative  Negative for malaise/fatigue  HENT: Negative  Eyes: Negative  Negative for blurred vision  Respiratory: Negative  Negative for shortness of breath  Cardiovascular: Negative  Negative for chest pain, palpitations, orthopnea and PND  Gastrointestinal: Negative  Endocrine: Negative  Genitourinary: Negative  Musculoskeletal: Negative  Negative for neck pain  Skin: Negative  Allergic/Immunologic: Negative  Neurological: Negative  Negative for headaches  Hematological: Negative  Psychiatric/Behavioral: Negative  Objective:  Vitals:    01/27/22 0849 01/27/22 0921   BP: 130/78 124/78   BP Location: Right arm    Patient Position: Sitting    Cuff Size: Standard    Pulse: 69 68   Resp: 18    Temp: 98 °F (36 7 °C)    TempSrc: Tympanic    SpO2: 98%    Weight: 89 6 kg (197 lb 9 6 oz)    Height: 5' 6" (1 676 m)      Body mass index is 31 89 kg/m²  Physical Exam  Vitals and nursing note reviewed  Constitutional:       General: He is not in acute distress  Appearance: He is well-developed  He is not diaphoretic  Eyes:      Pupils: Pupils are equal, round, and reactive to light  Neck:      Thyroid: No thyromegaly  Vascular: No JVD  Trachea: No tracheal deviation  Cardiovascular:      Rate and Rhythm: Normal rate and regular rhythm  Heart sounds: Normal heart sounds  No murmur heard  No friction rub  No gallop  Pulmonary:      Effort: Pulmonary effort is normal  No respiratory distress  Breath sounds: Normal breath sounds  No stridor  No wheezing or rales  Chest:      Chest wall: No tenderness  Abdominal:      General: Bowel sounds are normal  There is no distension  Palpations: Abdomen is soft  There is no mass  Tenderness: There is no abdominal tenderness  There is no guarding or rebound  Musculoskeletal:         General: Normal range of motion  Cervical back: Normal range of motion and neck supple  Lymphadenopathy:      Cervical: No cervical adenopathy  Skin:     General: Skin is warm and dry  Coloration: Skin is not pale  Findings: No erythema or rash  Neurological:      Mental Status: He is alert and oriented to person, place, and time  Cranial Nerves: No cranial nerve deficit  Motor: No abnormal muscle tone  Coordination: Coordination normal       Deep Tendon Reflexes: Reflexes are normal and symmetric  Reflexes normal            BMI Counseling: Body mass index is 31 89 kg/m²   The BMI is above normal  Nutrition recommendations include decreasing portion sizes, encouraging healthy choices of fruits and vegetables, decreasing fast food intake, consuming healthier snacks, limiting drinks that contain sugar, moderation in carbohydrate intake, increasing intake of lean protein, reducing intake of saturated and trans fat and reducing intake of cholesterol  Exercise recommendations include exercising 3-5 times per week and strength training exercises  Rationale for BMI follow-up plan is due to patient being overweight or obese  Depression Screening and Follow-up Plan: Patient was screened for depression during today's encounter  They screened negative with a PHQ-2 score of 1

## 2022-01-31 NOTE — ASSESSMENT & PLAN NOTE
The patient's blood pressure is well controlled on his current medication  We have made no changes today  He will continue to work on improving his diet and exercise  We will see him back as scheduled

## 2022-01-31 NOTE — ASSESSMENT & PLAN NOTE
The patient will continue with the current dose of the rosuvastatin  We have made no changes today  We will see him back as scheduled

## 2022-04-05 NOTE — PROGRESS NOTES
Assessment/Plan:  Problem List Items Addressed This Visit        Cardiovascular and Mediastinum    Essential hypertension - Primary     The patient's blood pressure is well controlled with his current medication  We have made no changes today  He will continue to work on his healthy diet and exercise  We will see him back in the office as scheduled  Relevant Medications    amLODIPine (NORVASC) 5 mg tablet    lisinopril (ZESTRIL) 40 mg tablet    rosuvastatin (CRESTOR) 10 MG tablet    Other Relevant Orders    CBC and differential    Comprehensive metabolic panel    LDL cholesterol, direct    Lipid panel    TSH, 3rd generation with Free T4 reflex    UA (URINE) with reflex to Scope       Other    Hyperlipidemia, unspecified     The patient will continue on the current dose of his rosuvastatin along with his healthy lifestyle  We will see him back in the office as scheduled  Relevant Medications    rosuvastatin (CRESTOR) 10 MG tablet    Other Relevant Orders    CBC and differential    Comprehensive metabolic panel    LDL cholesterol, direct    Lipid panel    TSH, 3rd generation with Free T4 reflex    UA (URINE) with reflex to Scope    Nicotine dependence     Tobacco Cessation Counseling: Tobacco cessation counseling and education was provided  The patient is sincerely urged to quit consumption of tobacco  He is not ready to quit tobacco  The numerous health risks of tobacco consumption were discussed  If he decides to quit, there are a number of helpful adjunctive aids, and he can see me to discuss nicotine replacement therapy, chantix, or bupropion anytime in the future  Return in about 6 months (around 7/22/2021) for Annual physical-AWV  Subjective:   Chief Complaint   Patient presents with    Follow-up     No new or ongoing issues to be addressed today per patient   Medication Refill     Patient needs refills for his Amlodepine - he's not sure about the other two meds  Patient ID: Yina Junior is a 77 y o  male presents today for a routine checkup  Yina Junior is a 77 y o  male who presents today for routine checkup of his hypertension and hyperlipidemia  The patient denies any chest pain, shortness of breath, or palpitations  There is no edema  There are no headaches or visual changes  There is no lightheadedness, dizziness, or fainting spells  He is still smoking  The patient currently denies any nausea, vomiting, or GERD symptoms  he has normal bowel movements and normal urine output  he has a normal appetite  Hypertension  This is a chronic problem  The current episode started more than 1 year ago  The problem is unchanged  The problem is controlled  Pertinent negatives include no anxiety, blurred vision, chest pain, headaches, malaise/fatigue, neck pain, orthopnea, palpitations, peripheral edema, PND, shortness of breath or sweats  The following portions of the patient's history were reviewed and updated as appropriate: allergies, current medications, past family history, past medical history, past social history, past surgical history and problem list   Patient Active Problem List   Diagnosis    Benign localized hyperplasia of prostate    Essential hypertension    Hyperlipidemia, unspecified    Nicotine dependence    Nummular dermatitis     No past medical history on file  No past surgical history on file    No Known Allergies  Family History   Problem Relation Age of Onset    Hypertension Mother     Diabetes Father     Parkinsonism Father     Heart disease Sister         coronary heart disease(CHD)    Heart attack Sister 62        myocardial infarction     Social History     Socioeconomic History    Marital status:      Spouse name: Not on file    Number of children: 2    Years of education: Not on file    Highest education level: Not on file   Occupational History    Occupation: [address] [place] [address] resource strain: Not hard at all   Rajiv-[place] insecurity     Worry: Never true     Inability: Never true    Transportation needs     Medical: No     Non-medical: No   Tobacco Use    Smoking status: Current Every Day Smoker     Types: Cigars    Smokeless tobacco: Never Used    Tobacco comment: 5 cigars a day for 30 years  Substance and Sexual Activity    Alcohol use: Yes     Alcohol/week: 7 0 standard drinks     Types: 7 Cans of beer per week     Frequency: Monthly or less     Drinks per session: 3 or 4     Binge frequency: Less than monthly    Drug use: Never    Sexual activity: Not Currently   Lifestyle    Physical activity     Days per week: 0 days     Minutes per session: 0 min    Stress: Not at all   Relationships    Social connections     Talks on phone: More than three times a week     Gets together: Once a week     Attends [employer] service: Never     Active member of club or organization: No     Attends meetings of clubs or organizations: Never     Relationship status:     Intimate partner violence     Fear of current or ex partner: No     Emotionally abused: No     Physically abused: No     Forced sexual activity: No   Other Topics Concern    Not on file   Social History Narrative    Sendentary lifestyle    , per [employer]    Working full-time     No current outpatient medications on file prior to visit  No current facility-administered medications on file prior to visit  Review of Systems   Constitutional: Negative  Negative for malaise/fatigue  HENT: Negative  Eyes: Negative  Negative for blurred vision  Respiratory: Negative  Negative for shortness of breath  Cardiovascular: Negative  Negative for chest pain, palpitations, orthopnea and PND  Gastrointestinal: Negative  Endocrine: Negative  Genitourinary: Negative  Musculoskeletal: Negative  Negative for neck pain  Skin: Negative  Allergic/Immunologic: Negative  Neurological: Negative  Negative for headaches  Hematological: Negative  Psychiatric/Behavioral: Negative  Objective:  Vitals:    01/22/21 0855   BP: 126/82   BP Location: Left arm   Patient Position: Sitting   Cuff Size: Large   Pulse: 69   Resp: 16   Temp: 98 2 °F (36 8 °C)   TempSrc: Tympanic   SpO2: 100%   Weight: 87 9 kg (193 lb 12 8 oz)   Height: 5' 7" (1 702 m)     Body mass index is 30 35 kg/m²  Physical Exam  Vitals signs and nursing note reviewed  Constitutional:       General: He is not in acute distress  Appearance: He is well-developed  He is not diaphoretic  Eyes:      Pupils: Pupils are equal, round, and reactive to light  Neck:      Musculoskeletal: Normal range of motion and neck supple  Thyroid: No thyromegaly  Vascular: No JVD  Trachea: No tracheal deviation  Cardiovascular:      Rate and Rhythm: Normal rate and regular rhythm  Heart sounds: Normal heart sounds  No murmur  No friction rub  No gallop  Pulmonary:      Effort: Pulmonary effort is normal  No respiratory distress  Breath sounds: Normal breath sounds  No stridor  No wheezing or rales  Chest:      Chest wall: No tenderness  Abdominal:      General: Bowel sounds are normal  There is no distension  Palpations: Abdomen is soft  There is no mass  Tenderness: There is no abdominal tenderness  There is no guarding or rebound  Musculoskeletal: Normal range of motion  Lymphadenopathy:      Cervical: No cervical adenopathy  Skin:     General: Skin is warm and dry  Coloration: Skin is not pale  Findings: No erythema or rash  Neurological:      Mental Status: He is alert and oriented to person, place, and time  Cranial Nerves: No cranial nerve deficit  Motor: No abnormal muscle tone  Coordination: Coordination normal       Deep Tendon Reflexes: Reflexes are normal and symmetric  Reflexes normal            BMI Counseling: Body mass index is 30 35 kg/m²   The BMI is above normal  Nutrition recommendations include decreasing portion sizes, encouraging healthy choices of fruits and vegetables, decreasing fast food intake, consuming healthier snacks, limiting drinks that contain sugar, moderation in carbohydrate intake, increasing intake of lean protein, reducing intake of saturated and trans fat and reducing intake of cholesterol  Exercise recommendations include exercising 3-5 times per week and strength training exercises  No pharmacotherapy was ordered  Patient referred to PCP due to patient being overweight  NEGATIVE yes

## 2022-06-18 LAB
ALBUMIN SERPL-MCNC: 4.5 G/DL (ref 3.8–4.8)
ALBUMIN/GLOB SERPL: 2.3 {RATIO} (ref 1.2–2.2)
ALP SERPL-CCNC: 45 IU/L (ref 44–121)
ALT SERPL-CCNC: 32 IU/L (ref 0–44)
AST SERPL-CCNC: 19 IU/L (ref 0–40)
BILIRUB SERPL-MCNC: <0.2 MG/DL (ref 0–1.2)
BUN SERPL-MCNC: 15 MG/DL (ref 8–27)
BUN/CREAT SERPL: 17 (ref 10–24)
CALCIUM SERPL-MCNC: 9.3 MG/DL (ref 8.6–10.2)
CHLORIDE SERPL-SCNC: 103 MMOL/L (ref 96–106)
CHOLEST SERPL-MCNC: 172 MG/DL (ref 100–199)
CHOLEST/HDLC SERPL: 4.5 RATIO (ref 0–5)
CO2 SERPL-SCNC: 24 MMOL/L (ref 20–29)
CREAT SERPL-MCNC: 0.9 MG/DL (ref 0.76–1.27)
EGFR: 93 ML/MIN/1.73
GLOBULIN SER-MCNC: 2 G/DL (ref 1.5–4.5)
GLUCOSE SERPL-MCNC: 106 MG/DL (ref 65–99)
HDLC SERPL-MCNC: 38 MG/DL
LDLC SERPL CALC-MCNC: 106 MG/DL (ref 0–99)
LDLC SERPL DIRECT ASSAY-MCNC: 94 MG/DL (ref 0–99)
POTASSIUM SERPL-SCNC: 4.7 MMOL/L (ref 3.5–5.2)
PROT SERPL-MCNC: 6.5 G/DL (ref 6–8.5)
SL AMB VLDL CHOLESTEROL CALC: 28 MG/DL (ref 5–40)
SODIUM SERPL-SCNC: 140 MMOL/L (ref 134–144)
TRIGL SERPL-MCNC: 157 MG/DL (ref 0–149)

## 2022-07-31 DIAGNOSIS — I10 ESSENTIAL HYPERTENSION: ICD-10-CM

## 2022-07-31 DIAGNOSIS — E78.2 MIXED HYPERLIPIDEMIA: ICD-10-CM

## 2022-07-31 RX ORDER — LISINOPRIL 40 MG/1
TABLET ORAL
Qty: 90 TABLET | Refills: 1 | Status: SHIPPED | OUTPATIENT
Start: 2022-07-31

## 2022-07-31 RX ORDER — ROSUVASTATIN CALCIUM 10 MG/1
TABLET, COATED ORAL
Qty: 90 TABLET | Refills: 1 | Status: SHIPPED | OUTPATIENT
Start: 2022-07-31

## 2022-07-31 RX ORDER — AMLODIPINE BESYLATE 5 MG/1
5 TABLET ORAL DAILY
Qty: 90 TABLET | Refills: 1 | Status: SHIPPED | OUTPATIENT
Start: 2022-07-31

## 2022-08-03 ENCOUNTER — OFFICE VISIT (OUTPATIENT)
Dept: FAMILY MEDICINE CLINIC | Facility: CLINIC | Age: 67
End: 2022-08-03
Payer: COMMERCIAL

## 2022-08-03 VITALS
TEMPERATURE: 98.1 F | DIASTOLIC BLOOD PRESSURE: 70 MMHG | SYSTOLIC BLOOD PRESSURE: 120 MMHG | HEART RATE: 68 BPM | RESPIRATION RATE: 12 BRPM | WEIGHT: 196.4 LBS | HEIGHT: 66 IN | OXYGEN SATURATION: 98 % | BODY MASS INDEX: 31.57 KG/M2

## 2022-08-03 DIAGNOSIS — Z12.11 SCREENING FOR COLON CANCER: ICD-10-CM

## 2022-08-03 DIAGNOSIS — Z00.00 MEDICARE ANNUAL WELLNESS VISIT, SUBSEQUENT: Primary | ICD-10-CM

## 2022-08-03 DIAGNOSIS — F17.290 OTHER TOBACCO PRODUCT NICOTINE DEPENDENCE, UNCOMPLICATED: ICD-10-CM

## 2022-08-03 PROCEDURE — G0438 PPPS, INITIAL VISIT: HCPCS | Performed by: FAMILY MEDICINE

## 2022-08-03 PROCEDURE — 3288F FALL RISK ASSESSMENT DOCD: CPT | Performed by: FAMILY MEDICINE

## 2022-08-03 PROCEDURE — 1160F RVW MEDS BY RX/DR IN RCRD: CPT | Performed by: FAMILY MEDICINE

## 2022-08-03 PROCEDURE — 1125F AMNT PAIN NOTED PAIN PRSNT: CPT | Performed by: FAMILY MEDICINE

## 2022-08-03 PROCEDURE — 1101F PT FALLS ASSESS-DOCD LE1/YR: CPT | Performed by: FAMILY MEDICINE

## 2022-08-03 PROCEDURE — 3725F SCREEN DEPRESSION PERFORMED: CPT | Performed by: FAMILY MEDICINE

## 2022-08-03 PROCEDURE — 1170F FXNL STATUS ASSESSED: CPT | Performed by: FAMILY MEDICINE

## 2022-08-03 NOTE — PATIENT INSTRUCTIONS
Medicare Preventive Visit Patient Instructions  Thank you for completing your Welcome to Medicare Visit or Medicare Annual Wellness Visit today  Your next wellness visit will be due in one year (8/4/2023)  The screening/preventive services that you may require over the next 5-10 years are detailed below  Some tests may not apply to you based off risk factors and/or age  Screening tests ordered at today's visit but not completed yet may show as past due  Also, please note that scanned in results may not display below  Preventive Screenings:  Service Recommendations Previous Testing/Comments   Colorectal Cancer Screening  · Colonoscopy    · Fecal Occult Blood Test (FOBT)/Fecal Immunochemical Test (FIT)  · Fecal DNA/Cologuard Test  · Flexible Sigmoidoscopy Age: 54-65 years old   Colonoscopy: every 10 years (May be performed more frequently if at higher risk)  OR  FOBT/FIT: every 1 year  OR  Cologuard: every 3 years  OR  Sigmoidoscopy: every 5 years  Screening may be recommended earlier than age 48 if at higher risk for colorectal cancer  Also, an individualized decision between you and your healthcare provider will decide whether screening between the ages of 74-80 would be appropriate   Colonoscopy: 11/28/2011  FOBT/FIT: Not on file  Cologuard: Not on file  Sigmoidoscopy: Not on file          Prostate Cancer Screening Individualized decision between patient and health care provider in men between ages of 53-78   Medicare will cover every 12 months beginning on the day after your 50th birthday PSA: No results in last 5 years           Hepatitis C Screening Once for adults born between 1945 and 1965  More frequently in patients at high risk for Hepatitis C Hep C Antibody: 05/29/2019        Diabetes Screening 1-2 times per year if you're at risk for diabetes or have pre-diabetes Fasting glucose: No results in last 5 years   A1C: No results in last 5 years        Cholesterol Screening Once every 5 years if you don't have a lipid disorder  May order more often based on risk factors  Lipid panel: 06/17/2022           Other Preventive Screenings Covered by Medicare:  1  Abdominal Aortic Aneurysm (AAA) Screening: covered once if your at risk  You're considered to be at risk if you have a family history of AAA or a male between the age of 73-68 who smoking at least 100 cigarettes in your lifetime  2  Lung Cancer Screening: covers low dose CT scan once per year if you meet all of the following conditions: (1) Age 50-69; (2) No signs or symptoms of lung cancer; (3) Current smoker or have quit smoking within the last 15 years; (4) You have a tobacco smoking history of at least 30 pack years (packs per day x number of years you smoked); (5) You get a written order from a healthcare provider  3  Glaucoma Screening: covered annually if you're considered high risk: (1) You have diabetes OR (2) Family history of glaucoma OR (3)  aged 48 and older OR (3)  American aged 72 and older  3  Osteoporosis Screening: covered every 2 years if you meet one of the following conditions: (1) Have a vertebral abnormality; (2) On glucocorticoid therapy for more than 3 months; (3) Have primary hyperparathyroidism; (4) On osteoporosis medications and need to assess response to drug therapy  5  HIV Screening: covered annually if you're between the age of 12-76  Also covered annually if you are younger than 13 and older than 72 with risk factors for HIV infection  For pregnant patients, it is covered up to 3 times per pregnancy      Immunizations:  Immunization Recommendations   Influenza Vaccine Annual influenza vaccination during flu season is recommended for all persons aged >= 6 months who do not have contraindications   Pneumococcal Vaccine (Prevnar and Pneumovax)  * Prevnar = PCV13  * Pneumovax = PPSV23 Adults 25-60 years old: 1-3 doses may be recommended based on certain risk factors  Adults 72 years old: Prevnar (PCV13) vaccine recommended followed by Pneumovax (PPSV23) vaccine  If already received PPSV23 since turning 65, then PCV13 recommended at least one year after PPSV23 dose  Hepatitis B Vaccine 3 dose series if at intermediate or high risk (ex: diabetes, end stage renal disease, liver disease)   Tetanus (Td) Vaccine - COST NOT COVERED BY MEDICARE PART B Following completion of primary series, a booster dose should be given every 10 years to maintain immunity against tetanus  Td may also be given as tetanus wound prophylaxis  Tdap Vaccine - COST NOT COVERED BY MEDICARE PART B Recommended at least once for all adults  For pregnant patients, recommended with each pregnancy  Shingles Vaccine (Shingrix) - COST NOT COVERED BY MEDICARE PART B  2 shot series recommended in those aged 48 and above     Health Maintenance Due:      Topic Date Due    Colorectal Cancer Screening  11/28/2016    Hepatitis C Screening  Completed     Immunizations Due:      Topic Date Due    COVID-19 Vaccine (3 - Booster for Pfizer series) 09/15/2021    Influenza Vaccine (1) 09/01/2022     Advance Directives   What are advance directives? Advance directives are legal documents that state your wishes and plans for medical care  These plans are made ahead of time in case you lose your ability to make decisions for yourself  Advance directives can apply to any medical decision, such as the treatments you want, and if you want to donate organs  What are the types of advance directives? There are many types of advance directives, and each state has rules about how to use them  You may choose a combination of any of the following:  · Living will: This is a written record of the treatment you want  You can also choose which treatments you do not want, which to limit, and which to stop at a certain time  This includes surgery, medicine, IV fluid, and tube feedings  · Durable power of  for healthcare [facility]):   This is a written record that states who you want to make healthcare choices for you when you are unable to make them for yourself  This person, called a proxy, is usually a family member or a friend  You may choose more than 1 proxy  · Do not resuscitate (DNR) order:  A DNR order is used in case your heart stops beating or you stop breathing  It is a request not to have certain forms of treatment, such as CPR  A DNR order may be included in other types of advance directives  · Medical directive: This covers the care that you want if you are in a coma, near death, or unable to make decisions for yourself  You can list the treatments you want for each condition  Treatment may include pain medicine, surgery, blood transfusions, dialysis, IV or tube feedings, and a ventilator (breathing machine)  · Values history: This document has questions about your views, beliefs, and how you feel and think about life  This information can help others choose the care that you would choose  Why are advance directives important? An advance directive helps you control your care  Although spoken wishes may be used, it is better to have your wishes written down  Spoken wishes can be misunderstood, or not followed  Treatments may be given even if you do not want them  An advance directive may make it easier for your family to make difficult choices about your care  Cigarette Smoking and Your Health   Risks to your health if you smoke:  Nicotine and other chemicals found in tobacco damage every cell in your body  Even if you are a light smoker, you have an increased risk for cancer, heart disease, and lung disease  If you are pregnant or have diabetes, smoking increases your risk for complications  Benefits to your health if you stop smoking:   · You decrease respiratory symptoms such as coughing, wheezing, and shortness of breath  · You reduce your risk for cancers of the lung, mouth, throat, kidney, bladder, pancreas, stomach, and cervix   If you already have cancer, you increase the benefits of chemotherapy  You also reduce your risk for cancer returning or a second cancer from developing  · You reduce your risk for heart disease, blood clots, heart attack, and stroke  · You reduce your risk for lung infections, and diseases such as pneumonia, asthma, chronic bronchitis, and emphysema  · Your circulation improves  More oxygen can be delivered to your body  If you have diabetes, you lower your risk for complications, such as kidney, artery, and eye diseases  You also lower your risk for nerve damage  Nerve damage can lead to amputations, poor vision, and blindness  · You improve your body's ability to heal and to fight infections  For more information and support to stop smoking:   · [employer]  Phone: 9- 569 - 211-6293  Web Address: [employer]  Weight Management   Why it is important to manage your weight:  Being overweight increases your risk of health conditions such as heart disease, high blood pressure, type 2 diabetes, and certain types of cancer  It can also increase your risk for osteoarthritis, sleep apnea, and other respiratory problems  Aim for a slow, steady weight loss  Even a small amount of weight loss can lower your risk of health problems  How to lose weight safely:  A safe and healthy way to lose weight is to eat fewer calories and get regular exercise  You can lose up about 1 pound a week by decreasing the number of calories you eat by 500 calories each day  Healthy meal plan for weight management:  A healthy meal plan includes a variety of foods, contains fewer calories, and helps you stay healthy  A healthy meal plan includes the following:  · Eat whole-grain foods more often  A healthy meal plan should contain fiber  Fiber is the part of grains, fruits, and vegetables that is not broken down by your body  Whole-grain foods are healthy and provide extra fiber in your diet   Some examples of whole-grain foods are whole-wheat breads and pastas, oatmeal, brown rice, and bulgur  · Eat a variety of vegetables every day  Include dark, leafy greens such as spinach, kale, polo greens, and mustard greens  Eat yellow and orange vegetables such as carrots, sweet potatoes, and winter squash  · Eat a variety of fruits every day  Choose fresh or canned fruit (canned in its own juice or light syrup) instead of juice  Fruit juice has very little or no fiber  · Eat low-fat dairy foods  Drink fat-free (skim) milk or 1% milk  Eat fat-free yogurt and low-fat cottage cheese  Try low-fat cheeses such as mozzarella and other reduced-fat cheeses  · Choose meat and other protein foods that are low in fat  Choose beans or other legumes such as split peas or lentils  Choose fish, skinless poultry (chicken or turkey), or lean cuts of red meat (beef or pork)  Before you cook meat or poultry, cut off any visible fat  · Use less fat and oil  Try baking foods instead of frying them  Add less fat, such as margarine, sour cream, regular salad dressing and mayonnaise to foods  Eat fewer high-fat foods  Some examples of high-fat foods include french fries, doughnuts, ice cream, and cakes  · Eat fewer sweets  Limit foods and drinks that are high in sugar  This includes candy, cookies, regular soda, and sweetened drinks  Exercise:  Exercise at least 30 minutes per day on most days of the week  Some examples of exercise include walking, biking, dancing, and swimming  You can also fit in more physical activity by taking the stairs instead of the elevator or parking farther away from stores  Ask your healthcare provider about the best exercise plan for you  © Copyright [employer] 2018 Information is for End User's use only and may not be sold, redistributed or otherwise used for commercial purposes   All illustrations and images included in CareNotes® are the copyrighted property of [employer]  or [address] [place] [address]

## 2022-08-03 NOTE — PROGRESS NOTES
Assessment and Plan:     Problem List Items Addressed This Visit        Other    Nicotine dependence     Tobacco Cessation Counseling: Tobacco cessation counseling and education was provided  The patient is sincerely urged to quit consumption of tobacco  He is not ready to quit tobacco  The numerous health risks of tobacco consumption were discussed  If he decides to quit, there are a number of helpful adjunctive aids, and he can see me to discuss nicotine replacement therapy, chantix, or bupropion anytime in the future  Relevant Orders    CT lung screening program      Other Visit Diagnoses     Medicare annual wellness visit, subsequent    -  Primary    Screening for colon cancer        Relevant Orders    Ambulatory referral for colonoscopy      The patient is refusing the Shingrix shingles vaccination  I did advise him strongly that he should get his colonoscopy in the near future and he is going to look to schedule this  In addition, he is strongly encouraged to quit smoking and we referred him for the lung cancer screening CT as indicated  He was advised to increase his exercise and continue to watch his diet  He will continue with his current medication  We will see him back as scheduled in 6 months  Depression Screening and Follow-up Plan: Patient was screened for depression during today's encounter  They screened negative with a PHQ-2 score of 0  Preventive health issues were discussed with patient, and age appropriate screening tests were ordered as noted in patient's After Visit Summary  Personalized health advice and appropriate referrals for health education or preventive services given if needed, as noted in patient's After Visit Summary    Chief Complaint   Patient presents with    Medicare Wellness Visit     AWV        History of Present Illness:     Patient presents for a Medicare Wellness Visit    Guilherme Sanz is a 74 y o  male presents today for subsequent Medicare wellness visit   His blood pressure is well controlled on his current medication  He has no complaints today  He continues to smoke cigars on a regular basis  He is doing well on his medication  The patient denies any chest pain, shortness of breath, or palpitations  There is no edema  There are no headaches or visual changes  There is no lightheadedness, dizziness, or fainting spells  The patient currently denies any nausea, vomiting, or GERD symptoms  he has normal bowel movements and normal urine output  he has a normal appetite  There are no GI problems  The patient does see Urology regularly for his elevated PSA and gets his PSA checked twice a year  He will be due to see them again in January 2023  Patient Care Team:  Ami Uribe DO as PCP - General  Ethel Madrid MD (Urology)     Review of Systems:     Review of Systems   Constitutional: Negative  HENT: Negative  Eyes: Negative  Respiratory: Negative  Cardiovascular: Negative  Gastrointestinal: Negative  Endocrine: Negative  Genitourinary: Negative  Musculoskeletal: Negative  Skin: Negative  Allergic/Immunologic: Negative  Neurological: Negative  Hematological: Negative  Psychiatric/Behavioral: Negative  Problem List:     Patient Active Problem List   Diagnosis    Benign localized hyperplasia of prostate    Essential hypertension    Hyperlipidemia, unspecified    Nicotine dependence    Nummular dermatitis      Past Medical and Surgical History:     History reviewed  No pertinent past medical history  History reviewed  No pertinent surgical history     Family History:     Family History   Problem Relation Age of Onset    Hypertension Mother     Diabetes Father     Parkinsonism Father     Heart disease Sister         coronary heart disease(CHD)    Heart attack Sister 62        myocardial infarction      Social History:     Social History     Socioeconomic History    Marital status:      Spouse name: None    Number of children: 2    Years of education: None    Highest education level: None   Occupational History    Occupation: [email or web address]   Tobacco Use    Smoking status: Current Every Day Smoker     Types: Cigars    Smokeless tobacco: Never Used    Tobacco comment: 5 cigars a day for 30 years  Vaping Use    Vaping Use: Never used   Substance and Sexual Activity    Alcohol use: Yes     Alcohol/week: 7 0 standard drinks     Types: 7 Cans of beer per week    Drug use: Never    Sexual activity: Not Currently   Other Topics Concern    None   Social History Narrative    Sendentary lifestyle    , per [employer]    Working full-time     Social Determinants of Health     Financial Resource Strain: Not on file   Food Insecurity: Not on file   Transportation Needs: Not on file   Physical Activity: Not on file   Stress: Not on file   Social Connections: Not on file   Intimate Partner Violence: Not At Risk    Fear of Current or Ex-Partner: No    Emotionally Abused: No    Physically Abused: No    Sexually Abused: No   Housing Stability: Not on file      Medications and Allergies:     Current Outpatient Medications   Medication Sig Dispense Refill    amLODIPine (NORVASC) 5 mg tablet TAKE 1 TABLET (5 MG TOTAL) BY MOUTH DAILY  90 tablet 1    lisinopril (ZESTRIL) 40 mg tablet TAKE 1 TABLET BY MOUTH EVERY DAY 90 tablet 1    rosuvastatin (CRESTOR) 10 MG tablet TAKE 1 TABLET BY MOUTH EVERY DAY 90 tablet 1     No current facility-administered medications for this visit       No Known Allergies   Immunizations:     Immunization History   Administered Date(s) Administered    COVID-19 PFIZER VACCINE 0 3 ML IM 03/25/2021, 04/15/2021    Pneumococcal Conjugate 13-Valent 03/18/2019    Pneumococcal Polysaccharide PPV23 05/27/2014, 07/21/2020    Tdap 07/18/2019      Health Maintenance:         Topic Date Due    Colorectal Cancer Screening  11/28/2016    Hepatitis C Screening Completed         Topic Date Due    COVID-19 Vaccine (3 - Booster for Pfizer series) 09/15/2021    Influenza Vaccine (1) 09/01/2022      Medicare Screening Tests and Risk Assessments:     Yenny Harris is here for his Welcome to Medicare visit  Health Risk Assessment:   Patient rates overall health as very good  Patient feels that their physical health rating is same  Patient is satisfied with their life  Eyesight was rated as same  Hearing was rated as same  Patient feels that their emotional and mental health rating is same  Patients states they are never, rarely angry  Patient states they are sometimes unusually tired/fatigued  Pain experienced in the last 7 days has been none  Patient states that he has experienced no weight loss or gain in last 6 months  Depression Screening:   PHQ-2 Score: 0      Fall Risk Screening: In the past year, patient has experienced: no history of falling in past year      Home Safety:  Patient does not have trouble with stairs inside or outside of their home  Patient has working smoke alarms and has working carbon monoxide detector  Home safety hazards include: none  Nutrition:   Current diet is Regular, Limited junk food and Low Carb  He does walk - there is no formal exercise  Medications:   Patient is currently taking over-the-counter supplements  OTC medications include: see medication list  Patient is able to manage medications  Activities of Daily Living (ADLs)/Instrumental Activities of Daily Living (IADLs):   Walk and transfer into and out of bed and chair?: Yes  Dress and groom yourself?: Yes    Bathe or shower yourself?: Yes    Feed yourself?  Yes  Do your laundry/housekeeping?: Yes  Manage your money, pay your bills and track your expenses?: Yes  Make your own meals?: Yes    Do your own shopping?: Yes    Previous Hospitalizations:   Any hospitalizations or ED visits within the last 12 months?: No      Advance Care Planning:   Living will: No    Durable POA for healthcare: No    Advanced directive: No    Advanced directive counseling given: Yes    Five wishes given: Yes    Patient declined ACP directive: No    End of Life Decisions reviewed with patient: No    Provider agrees with end of life decisions: No      Comments: He is going to look into this  Cognitive Screening:   Provider or family/friend/caregiver concerned regarding cognition?: No    PREVENTIVE SCREENINGS      Cardiovascular Screening:    General: Risks and Benefits Discussed and Screening Current      Diabetes Screening:     General: Screening Current and Risks and Benefits Discussed      Colorectal Cancer Screening:     General: Risks and Benefits Discussed    Due for: Colonoscopy - Low Risk      Prostate Cancer Screening:    General: Risks and Benefits Discussed and Screening Current      Osteoporosis Screening:    General: Screening Not Indicated      Abdominal Aortic Aneurysm (AAA) Screening:    Risk factors include: age between 73-69 yo and tobacco use        General: Screening Not Indicated      Lung Cancer Screening:     General: Screening Not Indicated      Hepatitis C Screening:    General: Screening Current    Screening, Brief Intervention, and Referral to Treatment (SBIRT)    Screening  Typical number of drinks in a day: 1  Typical number of drinks in a week: 7  Interpretation: Low risk drinking behavior  AUDIT-C Screenin) How often did you have a drink containing alcohol in the past year? 4 or more times a week  2) How many drinks did you have on a typical day when you were drinking in the past year? 1 to 2  3) How often did you have 6 or more drinks on one occasion in the past year? never    AUDIT-C Score: 4  Interpretation: Score 4-12 (male): POSITIVE screen for alcohol misuse    AUDIT Screenin) How often during the last year have you found that you were not able to stop drinking once you had started?  0 - never  5) How often during the last year have you failed to do what was normally expected from you because of drinking? 0 - never  6) How often during the last year have you needed a first drink in the morning to get yourself going after a heavy drinking session? 0 - never  7) How often during the last year have you had a feeling of guilt or remorse after drinking? 0 - never  8) How often during the last year have you been unable to remember what happened the night before because you had been drinking? 0 - never  9) Have you or someone else been injured as a result of your drinking? 0 - no  10) Has a relative or friend or a doctor or another health worker been concerned about your drinking or suggested you cut down? 0 - no    AUDIT Score: 4  Interpretation: Low risk alcohol consumption    Single Item Drug Screening:  How often have you used an illegal drug (including marijuana) or a prescription medication for non-medical reasons in the past year? never    Single Item Drug Screen Score: 0  Interpretation: Negative screen for possible drug use disorder    Brief Intervention  Alcohol & drug use screenings were reviewed  No concerns regarding substance use disorder identified  Other Counseling Topics:   Car/seat belt/driving safety, skin self-exam, sunscreen and calcium and vitamin D intake and regular weightbearing exercise  Visual Acuity Screening    Right eye Left eye Both eyes   Without correction: 20/30 20/50 20/40   With correction:      Comments: He wears reading glasses and is going to schedule an eye checkup  Physical Exam:     /70   Pulse 68   Temp 98 1 °F (36 7 °C)   Resp 12   Ht 5' 6 1" (1 679 m)   Wt 89 1 kg (196 lb 6 4 oz)   SpO2 98%   BMI 31 60 kg/m²     Physical Exam  Vitals and nursing note reviewed  Constitutional:       Appearance: He is well-developed  HENT:      Head: Normocephalic and atraumatic        Right Ear: External ear normal       Left Ear: External ear normal       Nose: Nose normal       Mouth/Throat:      Pharynx: No oropharyngeal exudate  Eyes:      Conjunctiva/sclera: Conjunctivae normal       Pupils: Pupils are equal, round, and reactive to light  Neck:      Thyroid: No thyromegaly  Trachea: No tracheal deviation  Cardiovascular:      Rate and Rhythm: Normal rate and regular rhythm  Heart sounds: Normal heart sounds  No murmur heard  No friction rub  No gallop  Pulmonary:      Effort: Pulmonary effort is normal  No respiratory distress  Breath sounds: Normal breath sounds  No stridor  No wheezing or rales  Chest:      Chest wall: No tenderness  Abdominal:      General: Bowel sounds are normal  There is no distension  Palpations: Abdomen is soft  There is no mass  Tenderness: There is no abdominal tenderness  There is no guarding or rebound  Hernia: No hernia is present  Genitourinary:     Penis: No tenderness  Musculoskeletal:         General: No tenderness or deformity  Normal range of motion  Cervical back: Normal range of motion and neck supple  Lymphadenopathy:      Cervical: No cervical adenopathy  Skin:     General: Skin is warm and dry  Coloration: Skin is not pale  Findings: No erythema or rash  Neurological:      Mental Status: He is alert and oriented to person, place, and time  Cranial Nerves: No cranial nerve deficit  Sensory: No sensory deficit  Motor: No abnormal muscle tone  Coordination: Coordination normal       Deep Tendon Reflexes: Reflexes normal    Psychiatric:         Behavior: Behavior normal          Thought Content:  Thought content normal          Judgment: Judgment normal           Estle Blinks, DO

## 2023-02-08 ENCOUNTER — OFFICE VISIT (OUTPATIENT)
Dept: FAMILY MEDICINE CLINIC | Facility: CLINIC | Age: 68
End: 2023-02-08

## 2023-02-08 VITALS
HEIGHT: 66 IN | OXYGEN SATURATION: 99 % | RESPIRATION RATE: 17 BRPM | BODY MASS INDEX: 31.12 KG/M2 | SYSTOLIC BLOOD PRESSURE: 122 MMHG | WEIGHT: 193.6 LBS | TEMPERATURE: 97.4 F | DIASTOLIC BLOOD PRESSURE: 78 MMHG | HEART RATE: 67 BPM

## 2023-02-08 DIAGNOSIS — E78.2 MIXED HYPERLIPIDEMIA: ICD-10-CM

## 2023-02-08 DIAGNOSIS — F17.290 OTHER TOBACCO PRODUCT NICOTINE DEPENDENCE, UNCOMPLICATED: ICD-10-CM

## 2023-02-08 DIAGNOSIS — I10 ESSENTIAL HYPERTENSION: Primary | ICD-10-CM

## 2023-02-08 NOTE — PROGRESS NOTES
Assessment/Plan:  Problem List Items Addressed This Visit        Cardiovascular and Mediastinum    Essential hypertension - Primary     The patient's blood pressure is well controlled with his current medication  We have made no changes today  He will continue to work on improving his diet and exercise  We will see him back in the office as scheduled  Relevant Orders    CBC and differential    Comprehensive metabolic panel    LDL cholesterol, direct    Lipid panel    TSH, 3rd generation with Free T4 reflex    UA (URINE) with reflex to Scope       Other    Hyperlipidemia, unspecified     The patient will continue with the current dose of his rosuvastatin  We have made no changes today  We will see him back in the office as scheduled  Relevant Orders    CBC and differential    Comprehensive metabolic panel    LDL cholesterol, direct    Lipid panel    TSH, 3rd generation with Free T4 reflex    UA (URINE) with reflex to Scope    Nicotine dependence       Tobacco Cessation Counseling: Tobacco cessation counseling and education was provided  The patient is sincerely urged to quit consumption of tobacco  He is not ready to quit tobacco  The numerous health risks of tobacco consumption were discussed  If he decides to quit, there are a number of helpful adjunctive aids, and he can see me to discuss nicotine replacement therapy, chantix, or bupropion anytime in the future  Return in about 6 months (around 8/8/2023) for Annual physical    I spent 20 minutes during the visit reviewing the history from the patient, performing the examination, discussing the findings with the patient, providing counseling and education, and making a plan  I spent 10 minutes ordering referrals and testing and documenting  Subjective:   Chief Complaint   Patient presents with   • 6 month follow-up     Patient needs to see an oral surgeon - he needs a tooth pulled that his dentist couldn't take care of  Patient ID: Feng Deleon is a 69 y o  male presents today for routine checkup  Feng Deleon is a 69 y o  male who presents today for routine checkup of his hypertension and hyperlipidemia  He is doing well overall on his medication  He is scheduled to see the  Oral surgeon on 1/20/2023- he was on Amoxicillin for a week - he feels the pain is coming back - needs to call his dentist     The patient denies any chest pain, shortness of breath, or palpitations  There is no edema  There are no headaches or visual changes  There is no lightheadedness, dizziness, or fainting spells  The patient currently denies any nausea, vomiting, or GERD symptoms  he has normal bowel movements and normal urine output  he has a normal appetite  There are no problems  Hypertension  This is a chronic problem  The current episode started more than 1 year ago  The problem is unchanged  The problem is controlled  Pertinent negatives include no anxiety, blurred vision, chest pain, headaches, malaise/fatigue, neck pain, orthopnea, palpitations, peripheral edema, PND, shortness of breath or sweats  The current treatment provides significant improvement  There are no compliance problems  The following portions of the patient's history were reviewed and updated as appropriate: allergies, current medications, past family history, past medical history, past social history, past surgical history and problem list   Patient Active Problem List   Diagnosis   • Benign localized hyperplasia of prostate   • Essential hypertension   • Hyperlipidemia, unspecified   • Nicotine dependence   • Nummular dermatitis     History reviewed  No pertinent past medical history  History reviewed  No pertinent surgical history    No Known Allergies  Family History   Problem Relation Age of Onset   • Hypertension Mother    • Diabetes Father    • Parkinsonism Father    • Heart disease Sister         coronary heart disease(CHD)   • Heart attack Sister 62 myocardial infarction     Social History     Socioeconomic History   • Marital status:      Spouse name: Not on file   • Number of children: 2   • Years of education: Not on file   • Highest education level: Not on file   Occupational History   • Occupation: [employer]   Tobacco Use   • Smoking status: Every Day     Types: Cigars   • Smokeless tobacco: Never   • Tobacco comments:     5 cigars a day for 30 years  Vaping Use   • Vaping Use: Never used   Substance and Sexual Activity   • Alcohol use: Yes     Alcohol/week: 7 0 standard drinks     Types: 7 Cans of beer per week   • Drug use: Never   • Sexual activity: Not Currently   Other Topics Concern   • Not on file   Social History Narrative    Sendentary lifestyle    , per [employer]    Working full-time     Social Determinants of Health     Financial Resource Strain: Not on file   Food Insecurity: Not on file   Transportation Needs: Not on file   Physical Activity: Not on file   Stress: Not on file   Social Connections: Not on file   Intimate Partner Violence: Not At Risk   • Fear of Current or Ex-Partner: No   • Emotionally Abused: No   • Physically Abused: No   • Sexually Abused: No   Housing Stability: Not on file     Current Outpatient Medications on File Prior to Visit   Medication Sig Dispense Refill   • amLODIPine (NORVASC) 5 mg tablet TAKE 1 TABLET (5 MG TOTAL) BY MOUTH DAILY  90 tablet 1   • lisinopril (ZESTRIL) 40 mg tablet TAKE 1 TABLET BY MOUTH EVERY DAY 90 tablet 1   • rosuvastatin (CRESTOR) 10 MG tablet TAKE 1 TABLET BY MOUTH EVERY DAY 90 tablet 1     No current facility-administered medications on file prior to visit  Review of Systems   Constitutional: Negative  Negative for malaise/fatigue  HENT: Negative  Eyes: Negative  Negative for blurred vision  Respiratory: Negative  Negative for shortness of breath  Cardiovascular: Negative  Negative for chest pain, palpitations, orthopnea and PND  Gastrointestinal: Negative  Endocrine: Negative  Genitourinary: Negative  Musculoskeletal: Negative  Negative for neck pain  Skin: Negative  Allergic/Immunologic: Negative  Neurological: Negative  Negative for headaches  Hematological: Negative  Psychiatric/Behavioral: Negative  Objective:  Vitals:    02/08/23 0816   BP: 122/78   BP Location: Right arm   Patient Position: Sitting   Cuff Size: Standard   Pulse: 67   Resp: 17   Temp: (!) 97 4 °F (36 3 °C)   TempSrc: Tympanic   SpO2: 99%   Weight: 87 8 kg (193 lb 9 6 oz)   Height: 5' 6 1" (1 679 m)     Body mass index is 31 15 kg/m²  Physical Exam  Vitals and nursing note reviewed  Constitutional:       General: He is not in acute distress  Appearance: He is well-developed  He is not diaphoretic  Eyes:      Pupils: Pupils are equal, round, and reactive to light  Neck:      Thyroid: No thyromegaly  Vascular: No JVD  Trachea: No tracheal deviation  Cardiovascular:      Rate and Rhythm: Normal rate and regular rhythm  Heart sounds: Normal heart sounds  No murmur heard  No friction rub  No gallop  Pulmonary:      Effort: Pulmonary effort is normal  No respiratory distress  Breath sounds: Normal breath sounds  No stridor  No wheezing or rales  Chest:      Chest wall: No tenderness  Abdominal:      General: Bowel sounds are normal  There is no distension  Palpations: Abdomen is soft  There is no mass  Tenderness: There is no abdominal tenderness  There is no guarding or rebound  Musculoskeletal:         General: Normal range of motion  Cervical back: Normal range of motion and neck supple  Lymphadenopathy:      Cervical: No cervical adenopathy  Skin:     General: Skin is warm and dry  Coloration: Skin is not pale  Findings: No erythema or rash  Neurological:      Mental Status: He is alert and oriented to person, place, and time        Cranial Nerves: No cranial nerve deficit  Motor: No abnormal muscle tone  Coordination: Coordination normal       Deep Tendon Reflexes: Reflexes are normal and symmetric  Reflexes normal            BMI Counseling: Body mass index is 31 15 kg/m²  The BMI is above normal  Nutrition recommendations include decreasing portion sizes, encouraging healthy choices of fruits and vegetables, decreasing fast food intake, consuming healthier snacks, limiting drinks that contain sugar, moderation in carbohydrate intake, increasing intake of lean protein, reducing intake of saturated and trans fat and reducing intake of cholesterol  Exercise recommendations include exercising 3-5 times per week  Patient referred to PCP  Rationale for BMI follow-up plan is due to patient being overweight or obese  Depression Screening and Follow-up Plan: Patient was screened for depression during today's encounter  They screened negative with a PHQ-2 score of 0

## 2023-02-10 NOTE — ASSESSMENT & PLAN NOTE
Tobacco Cessation Counseling: Tobacco cessation counseling and education was provided  The patient is sincerely urged to quit consumption of tobacco  He is not ready to quit tobacco  The numerous health risks of tobacco consumption were discussed  If he decides to quit, there are a number of helpful adjunctive aids, and he can see me to discuss nicotine replacement therapy, chantix, or bupropion anytime in the future

## 2023-02-10 NOTE — ASSESSMENT & PLAN NOTE
The patient's blood pressure is well controlled with his current medication  We have made no changes today  He will continue to work on improving his diet and exercise  We will see him back in the office as scheduled

## 2023-02-10 NOTE — ASSESSMENT & PLAN NOTE
The patient will continue with the current dose of his rosuvastatin  We have made no changes today  We will see him back in the office as scheduled

## 2023-07-18 LAB
ALBUMIN SERPL-MCNC: 4.3 G/DL (ref 3.9–4.9)
ALBUMIN/GLOB SERPL: 2 {RATIO} (ref 1.2–2.2)
ALP SERPL-CCNC: 47 IU/L (ref 44–121)
ALT SERPL-CCNC: 29 IU/L (ref 0–44)
APPEARANCE UR: CLEAR
AST SERPL-CCNC: 22 IU/L (ref 0–40)
BASOPHILS # BLD AUTO: 0.1 X10E3/UL (ref 0–0.2)
BASOPHILS NFR BLD AUTO: 1 %
BILIRUB SERPL-MCNC: 0.3 MG/DL (ref 0–1.2)
BILIRUB UR QL STRIP: NEGATIVE
BUN SERPL-MCNC: 14 MG/DL (ref 8–27)
BUN/CREAT SERPL: 15 (ref 10–24)
CALCIUM SERPL-MCNC: 9.2 MG/DL (ref 8.6–10.2)
CHLORIDE SERPL-SCNC: 103 MMOL/L (ref 96–106)
CHOLEST SERPL-MCNC: 141 MG/DL (ref 100–199)
CHOLEST/HDLC SERPL: 3.8 RATIO (ref 0–5)
CO2 SERPL-SCNC: 21 MMOL/L (ref 20–29)
COLOR UR: YELLOW
CREAT SERPL-MCNC: 0.95 MG/DL (ref 0.76–1.27)
EGFR: 87 ML/MIN/1.73
EOSINOPHIL # BLD AUTO: 0.2 X10E3/UL (ref 0–0.4)
EOSINOPHIL NFR BLD AUTO: 2 %
ERYTHROCYTE [DISTWIDTH] IN BLOOD BY AUTOMATED COUNT: 12.9 % (ref 11.6–15.4)
GLOBULIN SER-MCNC: 2.1 G/DL (ref 1.5–4.5)
GLUCOSE SERPL-MCNC: 105 MG/DL (ref 70–99)
GLUCOSE UR QL: NEGATIVE
HCT VFR BLD AUTO: 41.9 % (ref 37.5–51)
HDLC SERPL-MCNC: 37 MG/DL
HGB BLD-MCNC: 14.1 G/DL (ref 13–17.7)
HGB UR QL STRIP: NEGATIVE
IMM GRANULOCYTES # BLD: 0 X10E3/UL (ref 0–0.1)
IMM GRANULOCYTES NFR BLD: 0 %
KETONES UR QL STRIP: NEGATIVE
LDLC SERPL CALC-MCNC: 84 MG/DL (ref 0–99)
LDLC SERPL DIRECT ASSAY-MCNC: 88 MG/DL (ref 0–99)
LEUKOCYTE ESTERASE UR QL STRIP: NEGATIVE
LYMPHOCYTES # BLD AUTO: 3.1 X10E3/UL (ref 0.7–3.1)
LYMPHOCYTES NFR BLD AUTO: 40 %
MCH RBC QN AUTO: 30.8 PG (ref 26.6–33)
MCHC RBC AUTO-ENTMCNC: 33.7 G/DL (ref 31.5–35.7)
MCV RBC AUTO: 92 FL (ref 79–97)
MICRO URNS: NORMAL
MONOCYTES # BLD AUTO: 0.9 X10E3/UL (ref 0.1–0.9)
MONOCYTES NFR BLD AUTO: 12 %
NEUTROPHILS # BLD AUTO: 3.5 X10E3/UL (ref 1.4–7)
NEUTROPHILS NFR BLD AUTO: 45 %
NITRITE UR QL STRIP: NEGATIVE
PH UR STRIP: 5.5 [PH] (ref 5–7.5)
PLATELET # BLD AUTO: 260 X10E3/UL (ref 150–450)
POTASSIUM SERPL-SCNC: 4.8 MMOL/L (ref 3.5–5.2)
PROT SERPL-MCNC: 6.4 G/DL (ref 6–8.5)
PROT UR QL STRIP: NEGATIVE
RBC # BLD AUTO: 4.58 X10E6/UL (ref 4.14–5.8)
SL AMB VLDL CHOLESTEROL CALC: 20 MG/DL (ref 5–40)
SODIUM SERPL-SCNC: 138 MMOL/L (ref 134–144)
SP GR UR: 1.02 (ref 1–1.03)
TRIGL SERPL-MCNC: 107 MG/DL (ref 0–149)
TSH SERPL DL<=0.005 MIU/L-ACNC: 1.85 UIU/ML (ref 0.45–4.5)
UROBILINOGEN UR STRIP-ACNC: 0.2 MG/DL (ref 0.2–1)
WBC # BLD AUTO: 7.8 X10E3/UL (ref 3.4–10.8)

## 2023-08-09 ENCOUNTER — OFFICE VISIT (OUTPATIENT)
Dept: FAMILY MEDICINE CLINIC | Facility: CLINIC | Age: 68
End: 2023-08-09
Payer: COMMERCIAL

## 2023-08-09 VITALS
DIASTOLIC BLOOD PRESSURE: 80 MMHG | SYSTOLIC BLOOD PRESSURE: 124 MMHG | HEIGHT: 66 IN | HEART RATE: 67 BPM | OXYGEN SATURATION: 96 % | BODY MASS INDEX: 30.25 KG/M2 | TEMPERATURE: 98.3 F | WEIGHT: 188.2 LBS | RESPIRATION RATE: 14 BRPM

## 2023-08-09 DIAGNOSIS — F17.290 OTHER TOBACCO PRODUCT NICOTINE DEPENDENCE, UNCOMPLICATED: ICD-10-CM

## 2023-08-09 DIAGNOSIS — Z00.00 MEDICARE ANNUAL WELLNESS VISIT, SUBSEQUENT: Primary | ICD-10-CM

## 2023-08-09 DIAGNOSIS — I10 ESSENTIAL HYPERTENSION: ICD-10-CM

## 2023-08-09 DIAGNOSIS — E78.2 MIXED HYPERLIPIDEMIA: ICD-10-CM

## 2023-08-09 PROCEDURE — G0439 PPPS, SUBSEQ VISIT: HCPCS | Performed by: FAMILY MEDICINE

## 2023-08-09 RX ORDER — ROSUVASTATIN CALCIUM 10 MG/1
TABLET, COATED ORAL
Qty: 90 TABLET | Refills: 1 | Status: SHIPPED | OUTPATIENT
Start: 2023-08-09

## 2023-08-09 RX ORDER — AMLODIPINE BESYLATE 5 MG/1
5 TABLET ORAL DAILY
Qty: 90 TABLET | Refills: 1 | OUTPATIENT
Start: 2023-08-09

## 2023-08-09 NOTE — PROGRESS NOTES
Assessment and Plan:     Problem List Items Addressed This Visit    None  Visit Diagnoses     Medicare annual wellness visit, subsequent    -  Primary           Preventive health issues were discussed with patient, and age appropriate screening tests were ordered as noted in patient's After Visit Summary. Personalized health advice and appropriate referrals for health education or preventive services given if needed, as noted in patient's After Visit Summary. History of Present Illness:     Patient presents for a Medicare Wellness Visit    HPI   Patient Care Team:  Darcy Harmon DO as PCP - General  Leonie Mederos MD (Urology)     Review of Systems:     Review of Systems     Problem List:     Patient Active Problem List   Diagnosis   • Benign localized hyperplasia of prostate   • Essential hypertension   • Hyperlipidemia, unspecified   • Nicotine dependence   • Nummular dermatitis      Past Medical and Surgical History:     No past medical history on file. No past surgical history on file. Family History:     Family History   Problem Relation Age of Onset   • Hypertension Mother    • Diabetes Father    • Parkinsonism Father    • Heart disease Sister         coronary heart disease(CHD)   • Heart attack Sister 62        myocardial infarction      Social History:     Social History     Socioeconomic History   • Marital status:      Spouse name: None   • Number of children: 2   • Years of education: None   • Highest education level: None   Occupational History   • Occupation: [employer]   Tobacco Use   • Smoking status: Every Day     Types: Cigars     Passive exposure: Past   • Smokeless tobacco: Never   • Tobacco comments:     5 cigars a day for 30 years. Vaping Use   • Vaping Use: Never used   Substance and Sexual Activity   • Alcohol use:  Yes     Alcohol/week: 7.0 standard drinks of alcohol     Types: 7 Cans of beer per week   • Drug use: Never   • Sexual activity: Not Currently   Other Topics Concern   • None   Social History Narrative    Sendentary lifestyle    , per Sajan    Working full-time     Social Determinants of Health     Financial Resource Strain: Low Risk  (8/9/2023)    Overall Financial Resource Strain (CARDIA)    • Difficulty of Paying Living Expenses: Not hard at all   Food Insecurity: No Food Insecurity (1/22/2021)    Hunger Vital Sign    • Worried About Running Out of Food in the Last Year: Never true    • Ran Out of Food in the Last Year: Never true   Transportation Needs: No Transportation Needs (8/9/2023)    PRAPARE - Transportation    • Lack of Transportation (Medical): No    • Lack of Transportation (Non-Medical): No   Physical Activity: Inactive (1/22/2021)    Exercise Vital Sign    • Days of Exercise per Week: 0 days    • Minutes of Exercise per Session: 0 min   Stress: No Stress Concern Present (1/22/2021)    [facility]    • Feeling of Stress : Not at all   Social Connections: Socially Isolated (1/22/2021)    Social Connection and Isolation Panel [NHANES]    • Frequency of Communication with Friends and Family: More than three times a week    • Frequency of Social Gatherings with Friends and Family: Once a week    • Attends Synagogue Services: Never    • Active Member of Clubs or Organizations: No    • Attends Club or Organization Meetings: Never    • Marital Status:    Intimate Partner Violence: Not At Risk (8/9/2023)    Humiliation, Afraid, Rape, and Kick questionnaire    • Fear of Current or Ex-Partner: No    • Emotionally Abused: No    • Physically Abused: No    • Sexually Abused: No   Housing Stability: Not on file      Medications and Allergies:     Current Outpatient Medications   Medication Sig Dispense Refill   • amLODIPine (NORVASC) 5 mg tablet TAKE 1 TABLET (5 MG TOTAL) BY MOUTH DAILY.  90 tablet 1   • lisinopril (ZESTRIL) 40 mg tablet TAKE 1 TABLET BY MOUTH EVERY DAY 90 tablet 1   • rosuvastatin (CRESTOR) 10 MG tablet TAKE 1 TABLET BY MOUTH EVERY DAY 90 tablet 1     No current facility-administered medications for this visit.      No Known Allergies   Immunizations:     Immunization History   Administered Date(s) Administered   • COVID-19 PFIZER VACCINE 0.3 ML IM 03/25/2021, 04/15/2021, 12/23/2021   • Pneumococcal Conjugate 13-Valent 03/18/2019   • Pneumococcal Polysaccharide PPV23 05/27/2014, 07/21/2020   • Tdap 07/18/2019      Health Maintenance:         Topic Date Due   • Colorectal Cancer Screening  02/08/2024 (Originally 11/23/2000)   • Hepatitis C Screening  Completed         Topic Date Due   • Hepatitis A Vaccine (1 of 2 - Risk 2-dose series) Never done   • Hepatitis B Vaccine (1 of 3 - Risk 3-dose series) Never done   • COVID-19 Vaccine (4 - Pfizer series) 02/17/2022   • Influenza Vaccine (1) 09/01/2023      Medicare Screening Tests and Risk Assessments:     Annual Wellness Visit  Vision Screening    Right eye Left eye Both eyes   Without correction   20/25   With correction           Physical Exam:     /80 (BP Location: Right arm, Patient Position: Sitting, Cuff Size: Standard)   Pulse 67   Temp 98.3 °F (36.8 °C)   Resp 14   Ht 5' 6" (1.676 m)   Wt 85.4 kg (188 lb 3.2 oz)   SpO2 96%   BMI 30.38 kg/m²     Physical Exam     Alexy Canada DO

## 2023-08-09 NOTE — PATIENT INSTRUCTIONS
Medicare Preventive Visit Patient Instructions  Thank you for completing your Welcome to Medicare Visit or Medicare Annual Wellness Visit today. Your next wellness visit will be due in one year (8/9/2024). The screening/preventive services that you may require over the next 5-10 years are detailed below. Some tests may not apply to you based off risk factors and/or age. Screening tests ordered at today's visit but not completed yet may show as past due. Also, please note that scanned in results may not display below. Preventive Screenings:  Service Recommendations Previous Testing/Comments   Colorectal Cancer Screening  · Colonoscopy    · Fecal Occult Blood Test (FOBT)/Fecal Immunochemical Test (FIT)  · Fecal DNA/Cologuard Test  · Flexible Sigmoidoscopy Age: 43-73 years old   Colonoscopy: every 10 years (May be performed more frequently if at higher risk)  OR  FOBT/FIT: every 1 year  OR  Cologuard: every 3 years  OR  Sigmoidoscopy: every 5 years  Screening may be recommended earlier than age 39 if at higher risk for colorectal cancer. Also, an individualized decision between you and your healthcare provider will decide whether screening between the ages of 77-80 would be appropriate.  Colonoscopy: 11/28/2011  FOBT/FIT: Not on file  Cologuard: Not on file  Sigmoidoscopy: Not on file          Prostate Cancer Screening Individualized decision between patient and health care provider in men between ages of 53-66   Medicare will cover every 12 months beginning on the day after your 50th birthday PSA: No results in last 5 years           Hepatitis C Screening Once for adults born between 1945 and 1965  More frequently in patients at high risk for Hepatitis C Hep C Antibody: 05/29/2019        Diabetes Screening 1-2 times per year if you're at risk for diabetes or have pre-diabetes Fasting glucose: No results in last 5 years (No results in last 5 years)  A1C: No results in last 5 years (No results in last 5 years) Cholesterol Screening Once every 5 years if you don't have a lipid disorder. May order more often based on risk factors. Lipid panel: 07/17/2023         Other Preventive Screenings Covered by Medicare:  1. Abdominal Aortic Aneurysm (AAA) Screening: covered once if your at risk. You're considered to be at risk if you have a family history of AAA or a male between the age of 70-76 who smoking at least 100 cigarettes in your lifetime. 2. Lung Cancer Screening: covers low dose CT scan once per year if you meet all of the following conditions: (1) Age 48-67; (2) No signs or symptoms of lung cancer; (3) Current smoker or have quit smoking within the last 15 years; (4) You have a tobacco smoking history of at least 20 pack years (packs per day x number of years you smoked); (5) You get a written order from a healthcare provider. 3. Glaucoma Screening: covered annually if you're considered high risk: (1) You have diabetes OR (2) Family history of glaucoma OR (3)  aged 48 and older OR (3)  American aged 72 and older  3. Osteoporosis Screening: covered every 2 years if you meet one of the following conditions: (1) Have a vertebral abnormality; (2) On glucocorticoid therapy for more than 3 months; (3) Have primary hyperparathyroidism; (4) On osteoporosis medications and need to assess response to drug therapy. 5. HIV Screening: covered annually if you're between the age of 14-79. Also covered annually if you are younger than 13 and older than 72 with risk factors for HIV infection. For pregnant patients, it is covered up to 3 times per pregnancy.     Immunizations:  Immunization Recommendations   Influenza Vaccine Annual influenza vaccination during flu season is recommended for all persons aged >= 6 months who do not have contraindications   Pneumococcal Vaccine   * Pneumococcal conjugate vaccine = PCV13 (Prevnar 13), PCV15 (Vaxneuvance), PCV20 (Prevnar 20)  * Pneumococcal polysaccharide vaccine = PPSV23 (Pneumovax) Adults 2364 years old: 1-3 doses may be recommended based on certain risk factors  Adults 72 years old: 1-2 doses may be recommended based off what pneumonia vaccine you previously received   Hepatitis B Vaccine 3 dose series if at intermediate or high risk (ex: diabetes, end stage renal disease, liver disease)   Tetanus (Td) Vaccine - COST NOT COVERED BY MEDICARE PART B Following completion of primary series, a booster dose should be given every 10 years to maintain immunity against tetanus. Td may also be given as tetanus wound prophylaxis. Tdap Vaccine - COST NOT COVERED BY MEDICARE PART B Recommended at least once for all adults. For pregnant patients, recommended with each pregnancy. Shingles Vaccine (Shingrix) - COST NOT COVERED BY MEDICARE PART B  2 shot series recommended in those aged 48 and above     Health Maintenance Due:      Topic Date Due   • Colorectal Cancer Screening  02/08/2024 (Originally 11/23/1998)   • Hepatitis C Screening  Completed     Immunizations Due:      Topic Date Due   • Hepatitis A Vaccine (1 of 2 - Risk 2-dose series) Never done   • Hepatitis B Vaccine (1 of 3 - Risk 3-dose series) Never done   • COVID-19 Vaccine (4 - Pfizer series) 02/17/2022   • Influenza Vaccine (1) 09/01/2023     Advance Directives   What are advance directives? Advance directives are legal documents that state your wishes and plans for medical care. These plans are made ahead of time in case you lose your ability to make decisions for yourself. Advance directives can apply to any medical decision, such as the treatments you want, and if you want to donate organs. What are the types of advance directives? There are many types of advance directives, and each state has rules about how to use them. You may choose a combination of any of the following:  · Living will: This is a written record of the treatment you want.  You can also choose which treatments you do not want, which to limit, and which to stop at a certain time. This includes surgery, medicine, IV fluid, and tube feedings. · Durable power of  for [facility]): This is a written record that states who you want to make healthcare choices for you when you are unable to make them for yourself. This person, called a proxy, is usually a family member or a friend. You may choose more than 1 proxy. · Do not resuscitate (DNR) order:  A DNR order is used in case your heart stops beating or you stop breathing. It is a request not to have certain forms of treatment, such as CPR. A DNR order may be included in other types of advance directives. · Medical directive: This covers the care that you want if you are in a coma, near death, or unable to make decisions for yourself. You can list the treatments you want for each condition. Treatment may include pain medicine, surgery, blood transfusions, dialysis, IV or tube feedings, and a ventilator (breathing machine). · Values history: This document has questions about your views, beliefs, and how you feel and think about life. This information can help others choose the care that you would choose. Why are advance directives important? An advance directive helps you control your care. Although spoken wishes may be used, it is better to have your wishes written down. Spoken wishes can be misunderstood, or not followed. Treatments may be given even if you do not want them. An advance directive may make it easier for your family to make difficult choices about your care. Cigarette Smoking and Your Health   Risks to your health if you smoke:  Nicotine and other chemicals found in tobacco damage every cell in your body. Even if you are a light smoker, you have an increased risk for cancer, heart disease, and lung disease. If you are pregnant or have diabetes, smoking increases your risk for complications.    Benefits to your health if you stop smoking:   · You decrease respiratory symptoms such as coughing, wheezing, and shortness of breath. · You reduce your risk for cancers of the lung, mouth, throat, kidney, bladder, pancreas, stomach, and cervix. If you already have cancer, you increase the benefits of chemotherapy. You also reduce your risk for cancer returning or a second cancer from developing. · You reduce your risk for heart disease, blood clots, heart attack, and stroke. · You reduce your risk for lung infections, and diseases such as pneumonia, asthma, chronic bronchitis, and emphysema. · Your circulation improves. More oxygen can be delivered to your body. If you have diabetes, you lower your risk for complications, such as kidney, artery, and eye diseases. You also lower your risk for nerve damage. Nerve damage can lead to amputations, poor vision, and blindness. · You improve your body's ability to heal and to fight infections. For more information and support to stop smoking:   · [employer]  Phone: 3- 358 - [phone]  Web Address: www.[employer]  Weight Management   Why it is important to manage your weight:  Being overweight increases your risk of health conditions such as heart disease, high blood pressure, type 2 diabetes, and certain types of cancer. It can also increase your risk for osteoarthritis, sleep apnea, and other respiratory problems. Aim for a slow, steady weight loss. Even a small amount of weight loss can lower your risk of health problems. How to lose weight safely:  A safe and healthy way to lose weight is to eat fewer calories and get regular exercise. You can lose up about 1 pound a week by decreasing the number of calories you eat by 500 calories each day. Healthy meal plan for weight management:  A healthy meal plan includes a variety of foods, contains fewer calories, and helps you stay healthy. A healthy meal plan includes the following:  · Eat whole-grain foods more often. A healthy meal plan should contain fiber.  Fiber is the part of grains, fruits, and vegetables that is not broken down by your body. Whole-grain foods are healthy and provide extra fiber in your diet. Some examples of whole-grain foods are whole-wheat breads and pastas, oatmeal, brown rice, and bulgur. · Eat a variety of vegetables every day. Include dark, leafy greens such as spinach, kale, polo greens, and mustard greens. Eat yellow and orange vegetables such as carrots, sweet potatoes, and winter squash. · Eat a variety of fruits every day. Choose fresh or canned fruit (canned in its own juice or light syrup) instead of juice. Fruit juice has very little or no fiber. · Eat low-fat dairy foods. Drink fat-free (skim) milk or 1% milk. Eat fat-free yogurt and low-fat cottage cheese. Try low-fat cheeses such as mozzarella and other reduced-fat cheeses. · Choose meat and other protein foods that are low in fat. Choose beans or other legumes such as split peas or lentils. Choose fish, skinless poultry (chicken or turkey), or lean cuts of red meat (beef or pork). Before you cook meat or poultry, cut off any visible fat. · Use less fat and oil. Try baking foods instead of frying them. Add less fat, such as margarine, sour cream, regular salad dressing and mayonnaise to foods. Eat fewer high-fat foods. Some examples of high-fat foods include french fries, doughnuts, ice cream, and cakes. · Eat fewer sweets. Limit foods and drinks that are high in sugar. This includes candy, cookies, regular soda, and sweetened drinks. Exercise:  Exercise at least 30 minutes per day on most days of the week. Some examples of exercise include walking, biking, dancing, and swimming. You can also fit in more physical activity by taking the stairs instead of the elevator or parking farther away from stores. Ask your healthcare provider about the best exercise plan for you.       © Copyright [employer] 2018 Information is for End User's use only and may not be sold, redistributed or otherwise used for commercial purposes. All illustrations and images included in CareNotes® are the copyrighted property of [employer].A[employer]M., Inc. or [facility] Preventive Visit Patient Instructions  Thank you for completing your Welcome to Medicare Visit or Medicare Annual Wellness Visit today. Your next wellness visit will be due in one year (8/9/2024). The screening/preventive services that you may require over the next 5-10 years are detailed below. Some tests may not apply to you based off risk factors and/or age. Screening tests ordered at today's visit but not completed yet may show as past due. Also, please note that scanned in results may not display below. Preventive Screenings:  Service Recommendations Previous Testing/Comments   Colorectal Cancer Screening  · Colonoscopy    · Fecal Occult Blood Test (FOBT)/Fecal Immunochemical Test (FIT)  · Fecal DNA/Cologuard Test  · Flexible Sigmoidoscopy Age: 43-73 years old   Colonoscopy: every 10 years (May be performed more frequently if at higher risk)  OR  FOBT/FIT: every 1 year  OR  Cologuard: every 3 years  OR  Sigmoidoscopy: every 5 years  Screening may be recommended earlier than age 39 if at higher risk for colorectal cancer. Also, an individualized decision between you and your healthcare provider will decide whether screening between the ages of 77-80 would be appropriate.  Colonoscopy: 11/28/2011  FOBT/FIT: Not on file  Cologuard: Not on file  Sigmoidoscopy: Not on file          Prostate Cancer Screening Individualized decision between patient and health care provider in men between ages of 53-66   Medicare will cover every 12 months beginning on the day after your 50th birthday PSA: No results in last 5 years           Hepatitis C Screening Once for adults born between [address] [place] [address]  More frequently in patients at high risk for Hepatitis C Hep C Antibody: 05/29/2019    Screening Current   Diabetes Screening 1-2 times per year if you're at risk for diabetes or have pre-diabetes Fasting glucose: No results in last 5 years (No results in last 5 years)  A1C: No results in last 5 years (No results in last 5 years)  Screening Current   Cholesterol Screening Once every 5 years if you don't have a lipid disorder. May order more often based on risk factors. Lipid panel: 07/17/2023  Screening Not Indicated  History Lipid Disorder      Other Preventive Screenings Covered by Medicare:  6. Abdominal Aortic Aneurysm (AAA) Screening: covered once if your at risk. You're considered to be at risk if you have a family history of AAA or a male between the age of 70-76 who smoking at least 100 cigarettes in your lifetime. 7. Lung Cancer Screening: covers low dose CT scan once per year if you meet all of the following conditions: (1) Age 48-67; (2) No signs or symptoms of lung cancer; (3) Current smoker or have quit smoking within the last 15 years; (4) You have a tobacco smoking history of at least 20 pack years (packs per day x number of years you smoked); (5) You get a written order from a healthcare provider. 8. Glaucoma Screening: covered annually if you're considered high risk: (1) You have diabetes OR (2) Family history of glaucoma OR (3)  aged 48 and older OR (3)  American aged 72 and older  5. Osteoporosis Screening: covered every 2 years if you meet one of the following conditions: (1) Have a vertebral abnormality; (2) On glucocorticoid therapy for more than 3 months; (3) Have primary hyperparathyroidism; (4) On osteoporosis medications and need to assess response to drug therapy. 10. HIV Screening: covered annually if you're between the age of 14-79. Also covered annually if you are younger than 13 and older than 72 with risk factors for HIV infection. For pregnant patients, it is covered up to 3 times per pregnancy.     Immunizations:  Immunization Recommendations   Influenza Vaccine Annual influenza vaccination during flu season is recommended for all persons aged >= 6 months who do not have contraindications   Pneumococcal Vaccine   * Pneumococcal conjugate vaccine = PCV13 (Prevnar 13), PCV15 (Vaxneuvance), PCV20 (Prevnar 20)  * Pneumococcal polysaccharide vaccine = PPSV23 (Pneumovax) Adults 20-63 years old: 1-3 doses may be recommended based on certain risk factors  Adults 72 years old: 1-2 doses may be recommended based off what pneumonia vaccine you previously received   Hepatitis B Vaccine 3 dose series if at intermediate or high risk (ex: diabetes, end stage renal disease, liver disease)   Tetanus (Td) Vaccine - COST NOT COVERED BY MEDICARE PART B Following completion of primary series, a booster dose should be given every 10 years to maintain immunity against tetanus. Td may also be given as tetanus wound prophylaxis. Tdap Vaccine - COST NOT COVERED BY MEDICARE PART B Recommended at least once for all adults. For pregnant patients, recommended with each pregnancy. Shingles Vaccine (Shingrix) - COST NOT COVERED BY MEDICARE PART B  2 shot series recommended in those aged 48 and above     Health Maintenance Due:      Topic Date Due   • Colorectal Cancer Screening  02/08/2024 (Originally 11/23/1998)   • Hepatitis C Screening  Completed     Immunizations Due:      Topic Date Due   • Hepatitis A Vaccine (1 of 2 - Risk 2-dose series) Never done   • Hepatitis B Vaccine (1 of 3 - Risk 3-dose series) Never done   • COVID-19 Vaccine (4 - Pfizer series) 02/17/2022   • Influenza Vaccine (1) 09/01/2023     Advance Directives   What are advance directives? Advance directives are legal documents that state your wishes and plans for medical care. These plans are made ahead of time in case you lose your ability to make decisions for yourself. Advance directives can apply to any medical decision, such as the treatments you want, and if you want to donate organs. What are the types of advance directives?   There are many types of advance directives, and each state has rules about how to use them. You may choose a combination of any of the following:  · Living will: This is a written record of the treatment you want. You can also choose which treatments you do not want, which to limit, and which to stop at a certain time. This includes surgery, medicine, IV fluid, and tube feedings. · Durable power of  for healthcare [facility]): This is a written record that states who you want to make healthcare choices for you when you are unable to make them for yourself. This person, called a proxy, is usually a family member or a friend. You may choose more than 1 proxy. · Do not resuscitate (DNR) order:  A DNR order is used in case your heart stops beating or you stop breathing. It is a request not to have certain forms of treatment, such as CPR. A DNR order may be included in other types of advance directives. · Medical directive: This covers the care that you want if you are in a coma, near death, or unable to make decisions for yourself. You can list the treatments you want for each condition. Treatment may include pain medicine, surgery, blood transfusions, dialysis, IV or tube feedings, and a ventilator (breathing machine). · Values history: This document has questions about your views, beliefs, and how you feel and think about life. This information can help others choose the care that you would choose. Why are advance directives important? An advance directive helps you control your care. Although spoken wishes may be used, it is better to have your wishes written down. Spoken wishes can be misunderstood, or not followed. Treatments may be given even if you do not want them. An advance directive may make it easier for your family to make difficult choices about your care. Cigarette Smoking and Your Health   Risks to your health if you smoke:  Nicotine and other chemicals found in tobacco damage every cell in your body.  Even if you are a light smoker, you have an increased risk for cancer, heart disease, and lung disease. If you are pregnant or have diabetes, smoking increases your risk for complications. Benefits to your health if you stop smoking:   · You decrease respiratory symptoms such as coughing, wheezing, and shortness of breath. · You reduce your risk for cancers of the lung, mouth, throat, kidney, bladder, pancreas, stomach, and cervix. If you already have cancer, you increase the benefits of chemotherapy. You also reduce your risk for cancer returning or a second cancer from developing. · You reduce your risk for heart disease, blood clots, heart attack, and stroke. · You reduce your risk for lung infections, and diseases such as pneumonia, asthma, chronic bronchitis, and emphysema. · Your circulation improves. More oxygen can be delivered to your body. If you have diabetes, you lower your risk for complications, such as kidney, artery, and eye diseases. You also lower your risk for nerve damage. Nerve damage can lead to amputations, poor vision, and blindness. · You improve your body's ability to heal and to fight infections. For more information and support to stop smoking:   · [employer]  Phone: 5- 190 - [phone]  Web Address: www.[employer].mobi  Weight Management   Why it is important to manage your weight:  Being overweight increases your risk of health conditions such as heart disease, high blood pressure, type 2 diabetes, and certain types of cancer. It can also increase your risk for osteoarthritis, sleep apnea, and other respiratory problems. Aim for a slow, steady weight loss. Even a small amount of weight loss can lower your risk of health problems. How to lose weight safely:  A safe and healthy way to lose weight is to eat fewer calories and get regular exercise. You can lose up about 1 pound a week by decreasing the number of calories you eat by 500 calories each day.    Healthy meal plan for weight management:  A healthy meal plan includes a variety of foods, contains fewer calories, and helps you stay healthy. A healthy meal plan includes the following:  · Eat whole-grain foods more often. A healthy meal plan should contain fiber. Fiber is the part of grains, fruits, and vegetables that is not broken down by your body. Whole-grain foods are healthy and provide extra fiber in your diet. Some examples of whole-grain foods are whole-wheat breads and pastas, oatmeal, brown rice, and bulgur. · Eat a variety of vegetables every day. Include dark, leafy greens such as spinach, kale, polo greens, and mustard greens. Eat yellow and orange vegetables such as carrots, sweet potatoes, and winter squash. · Eat a variety of fruits every day. Choose fresh or canned fruit (canned in its own juice or light syrup) instead of juice. Fruit juice has very little or no fiber. · Eat low-fat dairy foods. Drink fat-free (skim) milk or 1% milk. Eat fat-free yogurt and low-fat cottage cheese. Try low-fat cheeses such as mozzarella and other reduced-fat cheeses. · Choose meat and other protein foods that are low in fat. Choose beans or other legumes such as split peas or lentils. Choose fish, skinless poultry (chicken or turkey), or lean cuts of red meat (beef or pork). Before you cook meat or poultry, cut off any visible fat. · Use less fat and oil. Try baking foods instead of frying them. Add less fat, such as margarine, sour cream, regular salad dressing and mayonnaise to foods. Eat fewer high-fat foods. Some examples of high-fat foods include french fries, doughnuts, ice cream, and cakes. · Eat fewer sweets. Limit foods and drinks that are high in sugar. This includes candy, cookies, regular soda, and sweetened drinks. Exercise:  Exercise at least 30 minutes per day on most days of the week. Some examples of exercise include walking, biking, dancing, and swimming.  You can also fit in more physical activity by taking the stairs instead of the elevator or parking farther away from stores. Ask your healthcare provider about the best exercise plan for you. © Copyright [employer] 2018 Information is for End User's use only and may not be sold, redistributed or otherwise used for commercial purposes.  All illustrations and images included in CareNotes® are the copyrighted property of [employer]. or [address] [place] [address]

## 2023-08-09 NOTE — PROGRESS NOTES
Assessment and Plan:     Problem List Items Addressed This Visit        Other    Nicotine dependence     Tobacco Cessation Counseling: Tobacco cessation counseling and education was provided. The patient is sincerely urged to quit consumption of tobacco. He is not ready to quit tobacco. The numerous health risks of tobacco consumption were discussed. If he decides to quit, there are a number of helpful adjunctive aids, and he can see me to discuss nicotine replacement therapy, chantix, or bupropion anytime in the future. Other Visit Diagnoses     Medicare annual wellness visit, subsequent    -  Primary      The patient had a normal exam today in the office. He will continue with his healthy diet and exercise. His lab work all looked very good. He will continue to work on his diet and exercise. We will see him back in the office as scheduled in 6 months or sooner as needed. Depression Screening and Follow-up Plan: Patient was screened for depression during today's encounter. They screened negative with a PHQ-2 score of 0. Preventive health issues were discussed with patient, and age appropriate screening tests were ordered as noted in patient's After Visit Summary. Personalized health advice and appropriate referrals for health education or preventive services given if needed, as noted in patient's After Visit Summary. Chief Complaint   Patient presents with   • Medicare Wellness Visit        History of Present Illness:     Patient presents for a Medicare Wellness Visit    Shannon Estevez is a 69 y.o. male who presents today for subsequent Medicare wellness visit. He is doing very well on his medications. His blood pressure has been well-controlled. The patient denies any chest pain, shortness of breath, or palpitations. There is no edema. There are no headaches or visual changes. There is no lightheadedness, dizziness, or fainting spells.   The patient currently denies any nausea, vomiting, or GERD symptoms. he has normal bowel movements and normal urine output. he has a normal appetite. The patient continues to follow-up with urology regularly for management and supervision of his elevated PSA. He has remained stable. He had a case of gout a month ago and he took over the counter medications- none since then. Patient Care Team:  Darcy Harmon DO as PCP - General  Leonie Mederos MD (Urology)     Review of Systems:     Review of Systems   Constitutional: Negative. HENT: Negative. Eyes: Negative. Respiratory: Negative. Cardiovascular: Negative. Gastrointestinal: Negative. Endocrine: Negative. Genitourinary: Negative. Musculoskeletal: Negative. Skin: Negative. Allergic/Immunologic: Negative. Neurological: Negative. Hematological: Negative. Psychiatric/Behavioral: Negative. Problem List:     Patient Active Problem List   Diagnosis   • Benign localized hyperplasia of prostate   • Essential hypertension   • Hyperlipidemia, unspecified   • Nicotine dependence   • Nummular dermatitis      Past Medical and Surgical History:     History reviewed. No pertinent past medical history. History reviewed. No pertinent surgical history. Family History:     Family History   Problem Relation Age of Onset   • Hypertension Mother    • Diabetes Father    • Parkinsonism Father    • Heart disease Sister         coronary heart disease(CHD)   • Heart attack Sister 62        myocardial infarction      Social History:     Social History     Socioeconomic History   • Marital status:      Spouse name: None   • Number of children: 2   • Years of education: None   • Highest education level: None   Occupational History   • Occupation: [employer]   Tobacco Use   • Smoking status: Every Day     Types: Cigars     Passive exposure: Past   • Smokeless tobacco: Never   • Tobacco comments:     5 cigars a day for 30 years.      Vaping Use   • Vaping Use: Never used   Substance and Sexual Activity   • Alcohol use: Yes     Alcohol/week: 7.0 standard drinks of alcohol     Types: 7 Cans of beer per week   • Drug use: Never   • Sexual activity: Not Currently   Other Topics Concern   • None   Social History Narrative    Sendentary lifestyle    , per [employer]    Working full-time     Social Determinants of Health     Financial Resource Strain: [address]  (8/9/2023)    Overall Financial Resource Strain (CARDIA)    • Difficulty of Paying Living Expenses: Not hard at all   Food Insecurity: No Food Insecurity (1/22/2021)    Hunger Vital Sign    • Worried About Running Out of Food in the Last Year: Never true    • Ran Out of Food in the Last Year: Never true   Transportation Needs: No Transportation Needs (8/9/2023)    PRAPARE - Transportation    • Lack of Transportation (Medical): No    • Lack of Transportation (Non-Medical):  No   Physical Activity: Inactive (1/22/2021)    Exercise Vital Sign    • Days of Exercise per Week: 0 days    • Minutes of Exercise per Session: 0 min   Stress: No Stress Concern Present (1/22/2021)    [address] [facility]    • Feeling of Stress : Not at all   Social Connections: Socially Isolated (1/22/2021)    Social Connection and Isolation Panel [NHANES]    • Frequency of Communication with Friends and Family: More than three times a week    • Frequency of Social Gatherings with Friends and Family: Once a week    • Attends Druze Services: Never    • Active Member of Clubs or Organizations: No    • Attends Club or Organization Meetings: Never    • Marital Status:    Intimate Partner Violence: Not At Risk (8/9/2023)    Humiliation, Afraid, Rape, and Kick questionnaire    • Fear of Current or Ex-Partner: No    • Emotionally Abused: No    • Physically Abused: No    • Sexually Abused: No   Housing Stability: Not on file      Medications and Allergies:     Current Outpatient Medications   Medication Sig Dispense Refill   • amLODIPine (NORVASC) 5 mg tablet TAKE 1 TABLET (5 MG TOTAL) BY MOUTH DAILY. 90 tablet 1   • lisinopril (ZESTRIL) 40 mg tablet TAKE 1 TABLET BY MOUTH EVERY DAY 90 tablet 1   • rosuvastatin (CRESTOR) 10 MG tablet TAKE 1 TABLET BY MOUTH EVERY DAY 90 tablet 1     No current facility-administered medications for this visit. No Known Allergies   Immunizations:     Immunization History   Administered Date(s) Administered   • COVID-19 PFIZER VACCINE 0.3 ML IM 03/25/2021, 04/15/2021, 12/23/2021   • Pneumococcal Conjugate 13-Valent 03/18/2019   • Pneumococcal Polysaccharide PPV23 05/27/2014, 07/21/2020   • Tdap 07/18/2019      Health Maintenance:         Topic Date Due   • Colorectal Cancer Screening  02/08/2024 (Originally 11/23/2000)   • Hepatitis C Screening  Completed         Topic Date Due   • COVID-19 Vaccine (4 - Pfizer series) 02/17/2022   • Influenza Vaccine (1) 09/01/2023      Medicare Screening Tests and Risk Assessments:     Tomas Perez is here for his Subsequent Wellness visit. Last Medicare Wellness visit information reviewed, patient interviewed and updates made to the record today. Health Risk Assessment:   Patient rates overall health as good. Patient feels that their physical health rating is same. Patient is satisfied with their life. Eyesight was rated as same. Hearing was rated as same. Patient feels that their emotional and mental health rating is same. Patients states they are never, rarely angry. Patient states they are never, rarely unusually tired/fatigued. Pain experienced in the last 7 days has been none. Patient states that he has experienced no weight loss or gain in last 6 months. Depression Screening:   PHQ-2 Score: 0      Fall Risk Screening: In the past year, patient has experienced: no history of falling in past year      Home Safety:  Patient does not have trouble with stairs inside or outside of their home.  Patient has working smoke alarms and has working carbon monoxide detector. Home safety hazards include: none. Nutrition:   Current diet is Regular. He is watching his diet- limiting purines. Medications:   Patient is not currently taking any over-the-counter supplements. Patient is able to manage medications. Activities of Daily Living (ADLs)/Instrumental Activities of Daily Living (IADLs):   Walk and transfer into and out of bed and chair?: Yes  Dress and groom yourself?: Yes    Bathe or shower yourself?: Yes    Feed yourself? Yes  Do your laundry/housekeeping?: Yes  Manage your money, pay your bills and track your expenses?: Yes  Make your own meals?: Yes    Do your own shopping?: Yes    Previous Hospitalizations:   Any hospitalizations or ED visits within the last 12 months?: No      Advance Care Planning:   Living will: No    Durable POA for healthcare: No    Advanced directive: No    Advanced directive counseling given: Yes    Five wishes given: Yes    Patient declined ACP directive: No    End of Life Decisions reviewed with patient: Yes    Provider agrees with end of life decisions: Yes      Comments: He is looking into this.       Cognitive Screening:   Provider or family/friend/caregiver concerned regarding cognition?: No    PREVENTIVE SCREENINGS      Cardiovascular Screening:    General: Screening Not Indicated and History Lipid Disorder      Diabetes Screening:     General: Screening Current      Prostate Cancer Screening:    General: Risks and Benefits Discussed      Osteoporosis Screening:    General: Risks and Benefits Discussed and Screening Current      Abdominal Aortic Aneurysm (AAA) Screening:    Risk factors include: age between 70-77 yo and tobacco use        Lung Cancer Screening:     General: Screening Not Indicated      Hepatitis C Screening:    General: Screening Current    Screening, Brief Intervention, and Referral to Treatment (SBIRT)    Screening  Typical number of drinks in a day: 0  Typical number of drinks in a week: 7  Interpretation: Low risk drinking behavior. AUDIT-C Screenin) How often did you have a drink containing alcohol in the past year? 4 or more times a week  2) How many drinks did you have on a typical day when you were drinking in the past year? 1 to 2  3) How often did you have 6 or more drinks on one occasion in the past year? never    AUDIT-C Score: 4  Interpretation: Score 4-12 (male): POSITIVE screen for alcohol misuse    AUDIT Screenin) How often during the last year have you found that you were not able to stop drinking once you had started? 0 - never  5) How often during the last year have you failed to do what was normally expected from you because of drinking? 0 - never  6) How often during the last year have you needed a first drink in the morning to get yourself going after a heavy drinking session? 0 - never  7) How often during the last year have you had a feeling of guilt or remorse after drinking? 0 - never  8) How often during the last year have you been unable to remember what happened the night before because you had been drinking? 0 - never  9) Have you or someone else been injured as a result of your drinking? 0 - no  10) Has a relative or friend or a doctor or another health worker been concerned about your drinking or suggested you cut down? 0 - no    AUDIT Score: 4  Interpretation: Low risk alcohol consumption    Single Item Drug Screening:  How often have you used an illegal drug (including marijuana) or a prescription medication for non-medical reasons in the past year? never    Single Item Drug Screen Score: 0  Interpretation: Negative screen for possible drug use disorder    Brief Intervention  Alcohol & drug use screenings were reviewed. No concerns regarding substance use disorder identified. Other Counseling Topics:   Car/seat belt/driving safety, skin self-exam, sunscreen and calcium and vitamin D intake and regular weightbearing exercise. Vision Screening    Right eye Left eye Both eyes   Without correction   20/25   With correction      Comments: Wears reading glasses-sees the eye doctor every 2 years. Physical Exam:     /80 (BP Location: Right arm, Patient Position: Sitting, Cuff Size: Standard)   Pulse 67   Temp 98.3 °F (36.8 °C)   Resp 14   Ht 5' 6" (1.676 m)   Wt 85.4 kg (188 lb 3.2 oz)   SpO2 96%   BMI 30.38 kg/m²     Physical Exam  Vitals and nursing note reviewed. Constitutional:       Appearance: He is well-developed. HENT:      Head: Normocephalic and atraumatic. Right Ear: External ear normal.      Left Ear: External ear normal.      Nose: Nose normal.      Mouth/Throat:      Pharynx: No oropharyngeal exudate. Eyes:      Conjunctiva/sclera: Conjunctivae normal.      Pupils: Pupils are equal, round, and reactive to light. Neck:      Thyroid: No thyromegaly. Trachea: No tracheal deviation. Cardiovascular:      Rate and Rhythm: Normal rate and regular rhythm. Heart sounds: Normal heart sounds. No murmur heard. No friction rub. No gallop. Pulmonary:      Effort: Pulmonary effort is normal. No respiratory distress. Breath sounds: Normal breath sounds. No stridor. No wheezing or rales. Chest:      Chest wall: No tenderness. Abdominal:      General: Bowel sounds are normal. There is no distension. Palpations: Abdomen is soft. There is no mass. Tenderness: There is no abdominal tenderness. There is no guarding or rebound. Hernia: No hernia is present. Genitourinary:     Penis: No tenderness. Musculoskeletal:         General: No tenderness or deformity. Normal range of motion. Cervical back: Normal range of motion and neck supple. Lymphadenopathy:      Cervical: No cervical adenopathy. Skin:     General: Skin is warm and dry. Coloration: Skin is not pale. Findings: No erythema or rash.    Neurological:      Mental Status: He is alert and oriented to person, place, and time. Cranial Nerves: No cranial nerve deficit. Sensory: No sensory deficit. Motor: No abnormal muscle tone. Coordination: Coordination normal.      Deep Tendon Reflexes: Reflexes normal.   Psychiatric:         Behavior: Behavior normal.         Thought Content:  Thought content normal.         Judgment: Judgment normal.        Recent Results (from the past 1008 hour(s))   CBC and differential    Collection Time: 07/17/23  7:20 AM   Result Value Ref Range    White Blood Cell Count 7.8 3.4 - 10.8 x10E3/uL    Red Blood Cell Count 4.58 4.14 - 5.80 x10E6/uL    Hemoglobin 14.1 13.0 - 17.7 g/dL    HCT 41.9 37.5 - 51.0 %    MCV 92 79 - 97 fL    MCH 30.8 26.6 - 33.0 pg    MCHC 33.7 31.5 - 35.7 g/dL    RDW 12.9 11.6 - 15.4 %    Platelet Count 899 824 - 450 x10E3/uL    Neutrophils 45 Not Estab. %    Lymphocytes 40 Not Estab. %    Monocytes 12 Not Estab. %    Eosinophils 2 Not Estab. %    Basophils PCT 1 Not Estab. %    Neutrophils (Absolute) 3.5 1.4 - 7.0 x10E3/uL    Lymphocytes (Absolute) 3.1 0.7 - 3.1 x10E3/uL    Monocytes (Absolute) 0.9 0.1 - 0.9 x10E3/uL    Eosinophils (Absolute) 0.2 0.0 - 0.4 x10E3/uL    Basophils ABS 0.1 0.0 - 0.2 x10E3/uL    Immature Granulocytes 0 Not Estab. %    Immature Granulocytes (Absolute) 0.0 0.0 - 0.1 x10E3/uL   Comprehensive metabolic panel    Collection Time: 07/17/23  7:20 AM   Result Value Ref Range    Glucose, Random 105 (H) 70 - 99 mg/dL    BUN 14 8 - 27 mg/dL    Creatinine 0.95 0.76 - 1.27 mg/dL    eGFR 87 >59 mL/min/1.73    SL AMB BUN/CREATININE RATIO 15 10 - 24    Sodium 138 134 - 144 mmol/L    Potassium 4.8 3.5 - 5.2 mmol/L    Chloride 103 96 - 106 mmol/L    CO2 21 20 - 29 mmol/L    CALCIUM 9.2 8.6 - 10.2 mg/dL    Protein, Total 6.4 6.0 - 8.5 g/dL    Albumin 4.3 3.9 - 4.9 g/dL    Globulin, Total 2.1 1.5 - 4.5 g/dL    Albumin/Globulin Ratio 2.0 1.2 - 2.2    TOTAL BILIRUBIN 0.3 0.0 - 1.2 mg/dL    Alk Phos Isoenzymes 47 44 - 121 IU/L AST 22 0 - 40 IU/L    ALT 29 0 - 44 IU/L   LDL cholesterol, direct    Collection Time: 07/17/23  7:20 AM   Result Value Ref Range    LDL Direct 88 0 - 99 mg/dL   Lipid panel    Collection Time: 07/17/23  7:20 AM   Result Value Ref Range    Cholesterol, Total 141 100 - 199 mg/dL    Triglycerides 107 0 - 149 mg/dL    HDL 37 (L) >39 mg/dL    VLDL Cholesterol Calculated 20 5 - 40 mg/dL    LDL Calculated 84 0 - 99 mg/dL    T.  Chol/HDL Ratio 3.8 0.0 - 5.0 ratio   TSH, 3rd generation with Free T4 reflex    Collection Time: 07/17/23  7:20 AM   Result Value Ref Range    TSH 1.850 0.450 - 4.500 uIU/mL   UA (URINE) with reflex to Scope    Collection Time: 07/17/23  7:20 AM   Result Value Ref Range    Specific Gravity 1.021 1.005 - 1.030    Ph 5.5 5.0 - 7.5    Color UA Yellow Yellow    Urine Appearance Clear Clear    Leukocyte Esterase Negative Negative    Protein Negative Negative/Trace    Glucose, 24 HR Urine Negative Negative    Ketone, Urine Negative Negative    Blood, Urine Negative Negative    Bilirubin, Urine Negative Negative    Urobilinogen Urine 0.2 0.2 - 1.0 mg/dL    Nitrites Urine Negative Negative    Microscopic Examination Comment            Lonna Opitz, DO

## 2023-08-18 DIAGNOSIS — I10 ESSENTIAL HYPERTENSION: ICD-10-CM

## 2023-08-18 RX ORDER — AMLODIPINE BESYLATE 5 MG/1
5 TABLET ORAL DAILY
Qty: 90 TABLET | Refills: 1 | Status: SHIPPED | OUTPATIENT
Start: 2023-08-18

## 2023-08-18 RX ORDER — LISINOPRIL 40 MG/1
TABLET ORAL
Qty: 90 TABLET | Refills: 1 | Status: SHIPPED | OUTPATIENT
Start: 2023-08-18

## 2023-09-25 ENCOUNTER — OFFICE VISIT (OUTPATIENT)
Dept: FAMILY MEDICINE CLINIC | Facility: CLINIC | Age: 68
End: 2023-09-25
Payer: COMMERCIAL

## 2023-09-25 ENCOUNTER — APPOINTMENT (OUTPATIENT)
Dept: RADIOLOGY | Facility: CLINIC | Age: 68
End: 2023-09-25
Payer: COMMERCIAL

## 2023-09-25 VITALS
HEIGHT: 66 IN | BODY MASS INDEX: 30.7 KG/M2 | SYSTOLIC BLOOD PRESSURE: 110 MMHG | OXYGEN SATURATION: 99 % | HEART RATE: 60 BPM | TEMPERATURE: 96.4 F | DIASTOLIC BLOOD PRESSURE: 78 MMHG | WEIGHT: 191 LBS | RESPIRATION RATE: 15 BRPM

## 2023-09-25 DIAGNOSIS — M79.641 PAIN IN BOTH HANDS: ICD-10-CM

## 2023-09-25 DIAGNOSIS — M79.642 PAIN IN BOTH HANDS: ICD-10-CM

## 2023-09-25 DIAGNOSIS — M25.50 POLYARTHRALGIA: ICD-10-CM

## 2023-09-25 DIAGNOSIS — M25.50 POLYARTHRALGIA: Primary | ICD-10-CM

## 2023-09-25 PROCEDURE — 73130 X-RAY EXAM OF HAND: CPT

## 2023-09-25 PROCEDURE — 99214 OFFICE O/P EST MOD 30 MIN: CPT | Performed by: FAMILY MEDICINE

## 2023-09-25 RX ORDER — PREDNISONE 10 MG/1
TABLET ORAL
Qty: 21 TABLET | Refills: 0 | Status: SHIPPED | OUTPATIENT
Start: 2023-09-25

## 2023-09-25 NOTE — PROGRESS NOTES
Assessment/Plan:     Diagnoses and all orders for this visit:    Polyarthralgia  -     Uric acid; Future  -     Lyme Disease Serology w/Reflex; Future  -     Sedimentation rate, automated; Future  -     XR hand 3+ vw right; Future  -     XR hand 3+ vw left; Future  -     ELIN w/Reflex if Positive; Future  -     predniSONE 10 mg tablet; 6 tabs on Day 1,5 tabs on Day 2,4 tabs on Day 3,3 tabs on Day 4,2 tabs on  Day 5And 1 tab on Day 6  -     Uric acid  -     Lyme Disease Serology w/Reflex  -     Sedimentation rate, automated  -     ELIN w/Reflex if Positive    Pain in both hands  -     XR hand 3+ vw right; Future  -     XR hand 3+ vw left; Future  -     predniSONE 10 mg tablet; 6 tabs on Day 1,5 tabs on Day 2,4 tabs on Day 3,3 tabs on Day 4,2 tabs on  Day 5And 1 tab on Day 6      Prednisone ordered  X-rays of hands ordered  Labs ordered  Follow-up as needed he had a course of playing the      Subjective:     Chief Complaint   Patient presents with   • Hand Pain     Right hand swollen and painful for 1 week        Patient ID: Siri Abbasi is a 69 y.o. male. r hand swelling for 1 week  Having hard time moving it- especially fingers   was taking tart cherry pills for gout  No prior history of gout. The following portions of the patient's history were reviewed and updated as appropriate: allergies, current medications, past family history, past medical history, past social history, past surgical history and problem list.    Review of Systems   Constitutional: Negative. HENT: Negative. Eyes: Negative. Respiratory: Negative. Cardiovascular: Negative. Gastrointestinal: Negative. Endocrine: Negative. Genitourinary: Negative. Musculoskeletal: Positive for arthralgias, joint swelling and myalgias. Skin: Negative. Allergic/Immunologic: Negative. Neurological: Negative. Hematological: Negative. Psychiatric/Behavioral: Negative. All other systems reviewed and are negative. Objective:    Vitals:    09/25/23 0844   BP: 110/78   BP Location: Left arm   Patient Position: Sitting   Cuff Size: Large   Pulse: 60   Resp: 15   Temp: (!) 96.4 °F (35.8 °C)   TempSrc: Tympanic   SpO2: 99%   Weight: 86.6 kg (191 lb)   Height: 5' 6" (1.676 m)          Physical Exam  Vitals and nursing note reviewed. Constitutional:       General: He is not in acute distress. Appearance: He is well-developed. HENT:      Head: Normocephalic. Right Ear: External ear normal.      Left Ear: External ear normal.      Nose: Nose normal.   Eyes:      General:         Right eye: No discharge. Left eye: No discharge. Conjunctiva/sclera: Conjunctivae normal.      Pupils: Pupils are equal, round, and reactive to light. Cardiovascular:      Rate and Rhythm: Normal rate and regular rhythm. Heart sounds: Normal heart sounds. Pulmonary:      Effort: Pulmonary effort is normal.      Breath sounds: Normal breath sounds. Abdominal:      General: Bowel sounds are normal. There is no distension. Palpations: Abdomen is soft. Tenderness: There is no abdominal tenderness. Musculoskeletal:         General: Normal range of motion. Cervical back: Normal range of motion. Skin:     General: Skin is warm and dry. Findings: No rash. Neurological:      Mental Status: He is alert and oriented to person, place, and time. Cranial Nerves: No cranial nerve deficit. Psychiatric:         Behavior: Behavior normal.         Thought Content:  Thought content normal.         Judgment: Judgment normal.

## 2023-09-26 LAB
ANA SER QL: NEGATIVE
B BURGDOR IGG+IGM SER QL IA: NEGATIVE
ERYTHROCYTE [SEDIMENTATION RATE] IN BLOOD BY WESTERGREN METHOD: 11 MM/HR (ref 0–30)
URATE SERPL-MCNC: 7.4 MG/DL (ref 3.8–8.4)

## 2023-09-29 ENCOUNTER — TELEPHONE (OUTPATIENT)
Dept: FAMILY MEDICINE CLINIC | Facility: CLINIC | Age: 68
End: 2023-09-29

## 2023-09-29 NOTE — TELEPHONE ENCOUNTER
Kiara Sy is calling about his labs and xray- I did advise the results of the x ray aren't final..  Please call him with the lab results   Thank you

## 2023-10-03 ENCOUNTER — TELEPHONE (OUTPATIENT)
Dept: FAMILY MEDICINE CLINIC | Facility: CLINIC | Age: 68
End: 2023-10-03

## 2023-10-03 DIAGNOSIS — M19.042 OSTEOARTHRITIS OF BOTH HANDS, UNSPECIFIED OSTEOARTHRITIS TYPE: Primary | ICD-10-CM

## 2023-10-03 DIAGNOSIS — M19.041 OSTEOARTHRITIS OF BOTH HANDS, UNSPECIFIED OSTEOARTHRITIS TYPE: Primary | ICD-10-CM

## 2023-10-03 RX ORDER — MELOXICAM 15 MG/1
15 TABLET ORAL DAILY PRN
Qty: 30 TABLET | Refills: 3 | Status: SHIPPED | OUTPATIENT
Start: 2023-10-03

## 2023-10-03 NOTE — TELEPHONE ENCOUNTER
Patient called. Patient saw you on 9/25. Patient completed the labs and x-rays ordered. Patient would like a call to review the results. Patient states he is not getting better.     Thank you

## 2023-10-03 NOTE — TELEPHONE ENCOUNTER
Patient called again and stated that he is going out this afternoon and would like you to call him on his cell [phone].       Thank You

## 2023-10-16 ENCOUNTER — TELEPHONE (OUTPATIENT)
Dept: FAMILY MEDICINE CLINIC | Facility: CLINIC | Age: 68
End: 2023-10-16

## 2023-10-16 NOTE — TELEPHONE ENCOUNTER
Patient called. Patient states that the meloxicam is not working. Patient states he is having difficulty lifting his arms over his head. Patient was wondering if he can increase his dose of meloxicam.     Please advise.  Thank you

## 2023-10-19 ENCOUNTER — OFFICE VISIT (OUTPATIENT)
Dept: FAMILY MEDICINE CLINIC | Facility: CLINIC | Age: 68
End: 2023-10-19
Payer: COMMERCIAL

## 2023-10-19 VITALS
HEART RATE: 72 BPM | RESPIRATION RATE: 16 BRPM | WEIGHT: 188.8 LBS | SYSTOLIC BLOOD PRESSURE: 162 MMHG | OXYGEN SATURATION: 98 % | BODY MASS INDEX: 30.34 KG/M2 | HEIGHT: 66 IN | DIASTOLIC BLOOD PRESSURE: 80 MMHG

## 2023-10-19 DIAGNOSIS — M19.049 ARTHRITIS PAIN OF HAND: Primary | ICD-10-CM

## 2023-10-19 DIAGNOSIS — M43.6 NECK STIFFNESS: ICD-10-CM

## 2023-10-19 PROCEDURE — 99214 OFFICE O/P EST MOD 30 MIN: CPT | Performed by: FAMILY MEDICINE

## 2023-10-19 RX ORDER — DICLOFENAC SODIUM 75 MG/1
75 TABLET, DELAYED RELEASE ORAL 2 TIMES DAILY
Qty: 60 TABLET | Refills: 0 | Status: SHIPPED | OUTPATIENT
Start: 2023-10-19

## 2023-10-19 NOTE — PROGRESS NOTES
Assessment/Plan:  Problem List Items Addressed This Visit    None  Visit Diagnoses     Arthritis pain of hands    -  Primary    Relevant Medications    diclofenac (VOLTAREN) 75 mg EC tablet    Other Relevant Orders    Rheumatoid Arthritis Factor (Completed)    Cyclic citrul peptide antibody, IgG    C-reactive protein (Completed)    Sedimentation rate, automated (Completed)    Neck stiffness        Relevant Medications    diclofenac (VOLTAREN) 75 mg EC tablet    Other Relevant Orders    Rheumatoid Arthritis Factor (Completed)    Cyclic citrul peptide antibody, IgG    C-reactive protein (Completed)    Sedimentation rate, automated (Completed)        The patient is having worsening pain and stiffness in her hands along with stiffness in his neck. The stiffness in his neck has worsened with the use of meloxicam.  We will start the patient will start on the Voltaren to help with his  hand discomfort and we will send him for the testing as indicated. We will monitor him closely. We will see him back in the office as scheduled. Return if symptoms worsen or fail to improve. I spent 15 minutes during the visit reviewing the history from the patient, performing the examination, discussing the findings with the patient, providing counseling and education, and making a plan. I spent 15 minutes ordering referrals and testing and documenting. Subjective:   Chief Complaint   Patient presents with   • Neck Stiffness     In hands and neck  Saw Dr. Landen Cantu about 3 weeks ago   Did get XR done but seems to still have pain  Mostly when trying to lift arms above head   • Arthritis     In both hands        Patient ID: Annita Velásquez is a 69 y.o. male presents today for evaluation of worsening pain in his hands and neck  Annita Velásquez is a 70 y/o male who presents with arthritis pain in his neck and both hands. He took prednisone and meloxicam and it helped little and went up his body.      There is stiffness in his neck and shoulders and he has a problems lifting up his arms. He has a remote history of rotator cuff tear in his right shoulder. There is pain in both thumbs. There is no numbness or tingling in the arms or hands. There is stiffness in his neck and trouble turning his neck due to spasm. His hands is better. There is no pain in his hands. He tried ibuprofen  He has a hard time getting out of bed. The patient is left handed. Arthritis  Presents for initial visit. The disease course has been worsening. He complains of pain, stiffness and joint swelling. He reports no joint warmth. Pertinent negatives include no diarrhea, dry eyes, dry mouth, dysuria, fatigue, fever, pain at night, pain while resting, rash, Raynaud's syndrome, uveitis or weight loss. The following portions of the patient's history were reviewed and updated as appropriate: allergies, current medications, past family history, past medical history, past social history, past surgical history and problem list.  Patient Active Problem List   Diagnosis   • Benign localized hyperplasia of prostate   • Essential hypertension   • Hyperlipidemia, unspecified   • Nicotine dependence   • Nummular dermatitis     History reviewed. No pertinent past medical history. History reviewed. No pertinent surgical history.   No Known Allergies  Family History   Problem Relation Age of Onset   • Hypertension Mother    • Diabetes Father    • Parkinsonism Father    • Heart disease Sister         coronary heart disease(CHD)   • Heart attack Sister 62        myocardial infarction     Social History     Socioeconomic History   • Marital status:      Spouse name: Not on file   • Number of children: 2   • Years of education: Not on file   • Highest education level: Not on file   Occupational History   • Occupation: [employer]   Tobacco Use   • Smoking status: Every Day     Types: Cigars     Passive exposure: Past   • Smokeless tobacco: Never   • Tobacco comments:     5 cigars a day for 30 years. Vaping Use   • Vaping Use: Never used   Substance and Sexual Activity   • Alcohol use: Yes     Alcohol/week: 7.0 standard drinks of alcohol     Types: 7 Cans of beer per week   • Drug use: Never   • Sexual activity: Not Currently   Other Topics Concern   • Not on file   Social History Narrative    Sendentary lifestyle    , per [employer]    Working full-time     Social Determinants of Health     Financial Resource Strain: [address]  (8/9/2023)    Overall Financial Resource Strain (CARDIA)    • Difficulty of Paying Living Expenses: Not hard at all   Food Insecurity: No Food Insecurity (1/22/2021)    Hunger Vital Sign    • Worried About Running Out of Food in the Last Year: Never true    • Ran Out of Food in the Last Year: Never true   Transportation Needs: No Transportation Needs (8/9/2023)    PRAPARE - Transportation    • Lack of Transportation (Medical): No    • Lack of Transportation (Non-Medical): No   Physical Activity: Inactive (1/22/2021)    Exercise Vital Sign    • Days of Exercise per Week: 0 days    • Minutes of Exercise per Session: 0 min   Stress: No Stress Concern Present (1/22/2021)    [facility]    • Feeling of Stress : Not at all   Social Connections: Socially Isolated (1/22/2021)    Social Connection and Isolation Panel [NHANES]    • Frequency of Communication with Friends and Family: More than three times a week    • Frequency of Social Gatherings with Friends and Family: Once a week    • Attends Rastafari Services: Never    • Active Member of Clubs or Organizations: No    • Attends Club or Organization Meetings: Never    • Marital Status:    Intimate Partner Violence: Not At Risk (9/25/2023)    Humiliation, Afraid, Rape, and Kick questionnaire    • Fear of Current or Ex-Partner: No    • Emotionally Abused: No    • Physically Abused: No    • Sexually Abused:  No Housing Stability: Not on file     Current Outpatient Medications on File Prior to Visit   Medication Sig Dispense Refill   • amLODIPine (NORVASC) 5 mg tablet TAKE 1 TABLET (5 MG TOTAL) BY MOUTH DAILY. 90 tablet 1   • lisinopril (ZESTRIL) 40 mg tablet TAKE 1 TABLET BY MOUTH EVERY DAY 90 tablet 1   • rosuvastatin (CRESTOR) 10 MG tablet TAKE 1 TABLET BY MOUTH EVERY DAY 90 tablet 1   • predniSONE 10 mg tablet 6 tabs on Day 1,5 tabs on Day 2,4 tabs on Day 3,3 tabs on Day 4,2 tabs on  Day 5And 1 tab on Day 6 (Patient not taking: Reported on 10/19/2023) 21 tablet 0     No current facility-administered medications on file prior to visit. Review of Systems   Constitutional: Negative. Negative for fatigue, fever and weight loss. HENT: Negative. Eyes: Negative. Respiratory: Negative. Cardiovascular: Negative. Gastrointestinal: Negative. Negative for diarrhea. Endocrine: Negative. Genitourinary: Negative. Negative for dysuria. Musculoskeletal:  Positive for arthralgias, arthritis, joint swelling and stiffness. Skin: Negative. Negative for rash. Allergic/Immunologic: Negative. Neurological: Negative. Hematological: Negative. Psychiatric/Behavioral: Negative. Objective:  Vitals:    10/19/23 1402   BP: 162/80   Pulse: 72   Resp: 16   SpO2: 98%   Weight: 85.6 kg (188 lb 12.8 oz)   Height: 5' 6" (1.676 m)     Body mass index is 30.47 kg/m². Physical Exam  Vitals and nursing note reviewed. Constitutional:       General: He is not in acute distress. Appearance: Normal appearance. He is well-developed. He is not diaphoretic. HENT:      Head: Normocephalic and atraumatic. Right Ear: Tympanic membrane, ear canal and external ear normal.      Left Ear: Tympanic membrane, ear canal and external ear normal.      Nose: No congestion or rhinorrhea. Mouth/Throat:      Mouth: Mucous membranes are moist.      Pharynx: Oropharynx is clear.    Eyes:      Extraocular Movements: Extraocular movements intact. Pupils: Pupils are equal, round, and reactive to light. Neck:      Thyroid: No thyromegaly. Vascular: No JVD. Trachea: No tracheal deviation. Cardiovascular:      Rate and Rhythm: Normal rate and regular rhythm. Heart sounds: Normal heart sounds. No murmur heard. No friction rub. No gallop. Pulmonary:      Effort: Pulmonary effort is normal. No respiratory distress. Breath sounds: Normal breath sounds. No stridor. No wheezing or rales. Chest:      Chest wall: No tenderness. Abdominal:      General: Bowel sounds are normal. There is no distension. Palpations: Abdomen is soft. There is no mass. Tenderness: There is no abdominal tenderness. There is no guarding or rebound. Musculoskeletal:         General: Normal range of motion. Cervical back: Normal range of motion and neck supple. Lymphadenopathy:      Cervical: No cervical adenopathy. Skin:     General: Skin is warm and dry. Coloration: Skin is not pale. Findings: No erythema or rash. Neurological:      Mental Status: He is alert and oriented to person, place, and time. Cranial Nerves: No cranial nerve deficit. Motor: No abnormal muscle tone. Coordination: Coordination normal.      Deep Tendon Reflexes: Reflexes are normal and symmetric. Reflexes normal.             Depression Screening and Follow-up Plan: Patient was screened for depression during today's encounter. They screened negative with a PHQ-2 score of 0.

## 2023-10-21 LAB
CRP SERPL-MCNC: 53 MG/L (ref 0–10)
ERYTHROCYTE [SEDIMENTATION RATE] IN BLOOD BY WESTERGREN METHOD: 13 MM/HR (ref 0–30)
RHEUMATOID FACT SERPL-ACNC: <10 IU/ML

## 2023-11-14 ENCOUNTER — TELEPHONE (OUTPATIENT)
Dept: FAMILY MEDICINE CLINIC | Facility: CLINIC | Age: 68
End: 2023-11-14

## 2023-11-14 DIAGNOSIS — M43.6 NECK STIFFNESS: ICD-10-CM

## 2023-11-14 DIAGNOSIS — M19.049 ARTHRITIS PAIN OF HAND: ICD-10-CM

## 2023-11-14 NOTE — TELEPHONE ENCOUNTER
Patient called.  He just wanted to know if his blood work was okay, because he is running out of the diclofenac and will need more.  I told him I would leave a message for you and we would call him back.  [phone]  Thank you.

## 2023-11-16 RX ORDER — DICLOFENAC SODIUM 75 MG/1
75 TABLET, DELAYED RELEASE ORAL 2 TIMES DAILY
Qty: 60 TABLET | Refills: 0 | Status: SHIPPED | OUTPATIENT
Start: 2023-11-16 | End: 2023-12-18 | Stop reason: SDUPTHER

## 2023-11-16 NOTE — TELEPHONE ENCOUNTER
Patient called back and said if the Doctor thinks he should still be taking the medication you can send in another refill. He is still in pain, it is getting better, but not totally away.  Patient uses [facility] in [place]. Please let me know If this was sent in so I can call patient and let him know thanks

## 2023-12-06 ENCOUNTER — OFFICE VISIT (OUTPATIENT)
Dept: FAMILY MEDICINE CLINIC | Facility: CLINIC | Age: 68
End: 2023-12-06
Payer: COMMERCIAL

## 2023-12-06 VITALS
SYSTOLIC BLOOD PRESSURE: 142 MMHG | RESPIRATION RATE: 16 BRPM | HEIGHT: 66 IN | WEIGHT: 193.8 LBS | HEART RATE: 57 BPM | BODY MASS INDEX: 31.15 KG/M2 | TEMPERATURE: 98.1 F | OXYGEN SATURATION: 97 % | DIASTOLIC BLOOD PRESSURE: 70 MMHG

## 2023-12-06 DIAGNOSIS — M19.049 ARTHRITIS PAIN OF HAND: Primary | ICD-10-CM

## 2023-12-06 DIAGNOSIS — M25.50 POLYARTHRALGIA: ICD-10-CM

## 2023-12-06 PROCEDURE — 99213 OFFICE O/P EST LOW 20 MIN: CPT | Performed by: PHYSICIAN ASSISTANT

## 2023-12-06 RX ORDER — PREDNISONE 10 MG/1
TABLET ORAL
Qty: 30 TABLET | Refills: 0 | Status: SHIPPED | OUTPATIENT
Start: 2023-12-06 | End: 2023-12-18

## 2023-12-06 NOTE — PROGRESS NOTES
Name: Rodriguez Varghese      : 1955      MRN: [id number]  Encounter Provider: Marek Alicia PA-C  Encounter Date: 2023   Encounter department: [facility]    Assessment & Plan     1. Arthritis pain of hand  -     Ambulatory Referral to Rheumatology; Future  -     predniSONE 10 mg tablet; Take 4 tablets (40 mg total) by mouth daily for 3 days, THEN 3 tablets (30 mg total) daily for 3 days, THEN 2 tablets (20 mg total) daily for 3 days, THEN 1 tablet (10 mg total) daily for 3 days. 2. Polyarthralgia  -     Ambulatory Referral to Rheumatology; Future  -     predniSONE 10 mg tablet; Take 4 tablets (40 mg total) by mouth daily for 3 days, THEN 3 tablets (30 mg total) daily for 3 days, THEN 2 tablets (20 mg total) daily for 3 days, THEN 1 tablet (10 mg total) daily for 3 days. Patient has follow-up appointment in February with Dr. Kashif Rose. Call with any questions or concerns. Depression Screening and Follow-up Plan: Patient was screened for depression during today's encounter. They screened negative with a PHQ-2 score of 0. Subjective      HPI  78-year-old male complaining of arthritic pain for greater than 3 months. Did review hand x-rays with patient which is showing significant arthritic changes in the left hand with some mild arthritis changes in the right hand. Patient also states get occasional shoulder pain lower back pain and cervical pain. Patient currently denying any back pain however states has significant in both hands as well as some mild shoulder and arm discomfort. Has any falls or trauma. CRP significantly elevated. Negative RF and ELIN. 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 Patient states in the past was prescribed Mobic which made his symptoms worse. Patient states was also prescribed some prednisone which did relieve his symptoms however they have returned. Currently on diclofenac which is giving minimal relief.   Review of Systems   Constitutional: Negative. Respiratory: Negative. Cardiovascular: Negative. Gastrointestinal: Negative. Neurological: Negative. Psychiatric/Behavioral: Negative. Current Outpatient Medications on File Prior to Visit   Medication Sig    amLODIPine (NORVASC) 5 mg tablet TAKE 1 TABLET (5 MG TOTAL) BY MOUTH DAILY. diclofenac (VOLTAREN) 75 mg EC tablet Take 1 tablet (75 mg total) by mouth 2 (two) times a day    lisinopril (ZESTRIL) 40 mg tablet TAKE 1 TABLET BY MOUTH EVERY DAY    rosuvastatin (CRESTOR) 10 MG tablet TAKE 1 TABLET BY MOUTH EVERY DAY    [DISCONTINUED] predniSONE 10 mg tablet 6 tabs on Day 1,5 tabs on Day 2,4 tabs on Day 3,3 tabs on Day 4,2 tabs on  Day 5And 1 tab on Day 6 (Patient not taking: Reported on 10/19/2023)       Objective     /70 (BP Location: Left arm, Patient Position: Sitting, Cuff Size: Large)   Pulse 57   Temp 98.1 °F (36.7 °C) (Tympanic)   Resp 16   Ht 5' 6" (1.676 m)   Wt 87.9 kg (193 lb 12.8 oz)   SpO2 97%   BMI 31.28 kg/m²     Physical Exam  Constitutional:       General: He is not in acute distress. Appearance: He is not ill-appearing, toxic-appearing or diaphoretic. HENT:      Head: Normocephalic. Cardiovascular:      Rate and Rhythm: Normal rate and regular rhythm. Pulmonary:      Effort: Pulmonary effort is normal.      Breath sounds: Normal breath sounds. Abdominal:      General: Bowel sounds are normal.      Tenderness: There is no abdominal tenderness. Musculoskeletal:      Comments: Noted arthritic changes both hands left greater than right   Lymphadenopathy:      Cervical: No cervical adenopathy. Neurological:      General: No focal deficit present. Mental Status: He is alert and oriented to person, place, and time.    Psychiatric:         Mood and Affect: Mood normal.       Cliff Guerra PA-C

## 2023-12-18 DIAGNOSIS — M19.049 ARTHRITIS PAIN OF HAND: ICD-10-CM

## 2023-12-18 DIAGNOSIS — M43.6 NECK STIFFNESS: ICD-10-CM

## 2023-12-18 RX ORDER — DICLOFENAC SODIUM 75 MG/1
75 TABLET, DELAYED RELEASE ORAL 2 TIMES DAILY
Qty: 30 TABLET | Refills: 5 | Status: SHIPPED | OUTPATIENT
Start: 2023-12-18

## 2024-01-03 DIAGNOSIS — M19.049 ARTHRITIS PAIN OF HAND: ICD-10-CM

## 2024-01-03 DIAGNOSIS — M43.6 NECK STIFFNESS: ICD-10-CM

## 2024-01-03 RX ORDER — DICLOFENAC SODIUM 75 MG/1
75 TABLET, DELAYED RELEASE ORAL 2 TIMES DAILY
Qty: 30 TABLET | Refills: 5 | Status: SHIPPED | OUTPATIENT
Start: 2024-01-03

## 2024-01-08 DIAGNOSIS — M19.049 ARTHRITIS PAIN OF HAND: ICD-10-CM

## 2024-01-08 DIAGNOSIS — I10 ESSENTIAL HYPERTENSION: ICD-10-CM

## 2024-01-08 DIAGNOSIS — M43.6 NECK STIFFNESS: ICD-10-CM

## 2024-01-08 RX ORDER — DICLOFENAC SODIUM 75 MG/1
75 TABLET, DELAYED RELEASE ORAL 2 TIMES DAILY
Qty: 30 TABLET | Refills: 5 | Status: SHIPPED | OUTPATIENT
Start: 2024-01-08

## 2024-01-08 RX ORDER — LISINOPRIL 40 MG/1
40 TABLET ORAL DAILY
Qty: 90 TABLET | Refills: 1 | Status: SHIPPED | OUTPATIENT
Start: 2024-01-08

## 2024-01-08 NOTE — TELEPHONE ENCOUNTER
Patient called. Voltaren was sent to the wrong pharmacy. Please send to [facility] in [place] along with the lisinopril

## 2024-01-22 DIAGNOSIS — M43.6 NECK STIFFNESS: ICD-10-CM

## 2024-01-22 DIAGNOSIS — M19.049 ARTHRITIS PAIN OF HAND: ICD-10-CM

## 2024-01-22 RX ORDER — DICLOFENAC SODIUM 75 MG/1
75 TABLET, DELAYED RELEASE ORAL 2 TIMES DAILY
Qty: 30 TABLET | Refills: 5 | Status: SHIPPED | OUTPATIENT
Start: 2024-01-22

## 2024-02-06 DIAGNOSIS — M43.6 NECK STIFFNESS: ICD-10-CM

## 2024-02-06 DIAGNOSIS — M19.049 ARTHRITIS PAIN OF HAND: ICD-10-CM

## 2024-02-06 RX ORDER — DICLOFENAC SODIUM 75 MG/1
75 TABLET, DELAYED RELEASE ORAL 2 TIMES DAILY
Qty: 30 TABLET | Refills: 5 | Status: SHIPPED | OUTPATIENT
Start: 2024-02-06

## 2024-02-11 DIAGNOSIS — E78.2 MIXED HYPERLIPIDEMIA: ICD-10-CM

## 2024-02-11 DIAGNOSIS — I10 ESSENTIAL HYPERTENSION: ICD-10-CM

## 2024-02-12 RX ORDER — ROSUVASTATIN CALCIUM 10 MG/1
TABLET, COATED ORAL
Qty: 90 TABLET | Refills: 1 | Status: SHIPPED | OUTPATIENT
Start: 2024-02-12

## 2024-02-20 DIAGNOSIS — M19.049 ARTHRITIS PAIN OF HAND: ICD-10-CM

## 2024-02-20 DIAGNOSIS — M43.6 NECK STIFFNESS: ICD-10-CM

## 2024-02-20 RX ORDER — DICLOFENAC SODIUM 75 MG/1
75 TABLET, DELAYED RELEASE ORAL 2 TIMES DAILY
Qty: 30 TABLET | Refills: 5 | Status: SHIPPED | OUTPATIENT
Start: 2024-02-20

## 2024-03-08 DIAGNOSIS — I10 ESSENTIAL HYPERTENSION: ICD-10-CM

## 2024-03-08 RX ORDER — AMLODIPINE BESYLATE 5 MG/1
5 TABLET ORAL DAILY
Qty: 90 TABLET | Refills: 1 | Status: SHIPPED | OUTPATIENT
Start: 2024-03-08

## 2024-03-22 DIAGNOSIS — M19.049 ARTHRITIS PAIN OF HAND: ICD-10-CM

## 2024-03-22 DIAGNOSIS — M43.6 NECK STIFFNESS: ICD-10-CM

## 2024-03-22 RX ORDER — DICLOFENAC SODIUM 75 MG/1
75 TABLET, DELAYED RELEASE ORAL 2 TIMES DAILY
Qty: 30 TABLET | Refills: 5 | Status: SHIPPED | OUTPATIENT
Start: 2024-03-22

## 2024-03-26 ENCOUNTER — OFFICE VISIT (OUTPATIENT)
Dept: RHEUMATOLOGY | Facility: CLINIC | Age: 69
End: 2024-03-26
Payer: COMMERCIAL

## 2024-03-26 VITALS
HEART RATE: 67 BPM | DIASTOLIC BLOOD PRESSURE: 82 MMHG | OXYGEN SATURATION: 98 % | WEIGHT: 196 LBS | BODY MASS INDEX: 31.5 KG/M2 | SYSTOLIC BLOOD PRESSURE: 124 MMHG | HEIGHT: 66 IN

## 2024-03-26 DIAGNOSIS — M25.50 POLYARTHRALGIA: ICD-10-CM

## 2024-03-26 DIAGNOSIS — M19.049 ARTHRITIS PAIN OF HAND: ICD-10-CM

## 2024-03-26 DIAGNOSIS — M35.3 PMR (POLYMYALGIA RHEUMATICA) (HCC): Primary | ICD-10-CM

## 2024-03-26 PROCEDURE — 99205 OFFICE O/P NEW HI 60 MIN: CPT | Performed by: INTERNAL MEDICINE

## 2024-03-26 RX ORDER — PREDNISONE 5 MG/1
15 TABLET ORAL DAILY
Qty: 90 TABLET | Refills: 3 | Status: SHIPPED | OUTPATIENT
Start: 2024-03-26

## 2024-03-26 NOTE — PROGRESS NOTES
This is a Rheumatology Consult seen at the request of Dr. Fountain      HPI: Bala Mejia is a 69 y/o male who presents for further evaluation arthralgias. He has past medical history HTN, HLD      Onset of symptoms 9/23. Started with b/l hands, pain and stiffness    Then progressed to b/l shoulders. Feels may have been related to picking up heavy water case.    No headaches, blurry or double vision, jaw claudication    Denies LE pain    He was prescribed prednisone with significant relief.     CRP was elevated          --------------------------------------------------------------------------------------------------------        ROS:        - for: Fevers, Chills or sweats.  No HAs or scalp tenderness.  No jaw claudication.  No acute visual or eye changes.  No dry eyes.  No auditory complaints.  No oral lesions or ulcers.  No dry mouth.  No sore throat or cough.  No chest pains or palpitations.  No shortness of breath, dyspnea on exertion or wheezing.  No hemotpysis.  No abdominal pain, GERD symptoms, diarrhea or constipation.  No urinary complaints.  No numbness, tingling or weakness.  No rashes.    All other ROS was reviewed and negative except as above         --------------------------------------------------------------------------------------------------------    Past Medical History    HTN  HLD        Past Surgical History    No surgeries         Family History    Family History   Problem Relation Age of Onset    Hypertension Mother     Diabetes Father     Parkinsonism Father     Heart disease Sister         coronary heart disease(CHD)    Heart attack Sister 58        myocardial infarction            Social History    Social History     Tobacco Use    Smoking status: Every Day     Types: Cigars     Passive exposure: Past    Smokeless tobacco: Never    Tobacco comments:     5 cigars a day for 30 years.     Vaping Use    Vaping status: Never Used   Substance Use Topics    Alcohol use: Not Currently     Comment: pt  "used to drink a lot but he stopped in september    Drug use: Never     Retired furniture [employer]     Allergies    No Known Allergies      Medications    Current Outpatient Medications   Medication Instructions    amLODIPine (NORVASC) 5 mg, Oral, Daily    diclofenac (VOLTAREN) 75 mg, Oral, 2 times daily    lisinopril (ZESTRIL) 40 mg, Oral, Daily    rosuvastatin (CRESTOR) 10 MG tablet TAKE 1 TABLET BY MOUTH EVERY DAY          Physical Exam    /82   Pulse 67   Ht 5' 6\" (1.676 m)   Wt 88.9 kg (196 lb)   SpO2 98%   BMI 31.64 kg/m²     GEN: AAO, No apparent distress.  Patient is well developed.  HEENT:  Pupils are equal, round and reactive.  Sclera are clear.  Fundoscopic exam is normal.  External ears are without lesions.  Oral pharynx is clear of ulcers or other lesions.  MMM.   NECK:  Supple.  There is no adenopathy appreciable in anterior or posterior cervical chains or supraclavicularly.  JVP is normal.    HEART: Regular rate and rhythm.  There is no appreciable murmur, gallop or rub.  LUNGS: Clear to auscultation.  ABD:  Soft, without tenderness, rebound or guarding.  No appreciable organomegally.  NEURO: Speech and cognition are normal.  Strength is 5/5 throughout.  Tone is normal.  DTRs are 2/4 at the knees, ankles and elbows.  Gait is normal.  SKIN: There are no rashes or lesions    MUSCULOSKELETAL:   + tj OA hands      ________________________________________________________________________          Results Review    Study Result    Narrative & Impression   LEFT HAND     INDICATION:   M25.50: Pain in unspecified joint  M79.641: Pain in right hand  M79.642: Pain in left hand.     COMPARISON:  None     VIEWS:  XR HAND 3+ VW LEFT        For the purposes of institution wide universal language the following terms will apply: (thumb=1st digit/finger, index finger=2nd digit/finger, long finger=3rd digit/finger, ring=4th digit/finger and small finger=5th digit/finger)     FINDINGS:     There is no acute " fracture or dislocation.     Scattered degenerative spurring along the interphalangeal joints. Slight subluxation at the second distal interphalangeal joint towards the ulnar aspect probably chronic. Associated severe degenerative changes. Small bony exostosis at the ulnar aspect of   the fourth middle phalanx proximal aspect.     No lytic or blastic osseous lesion.     Soft tissues are unremarkable.     IMPRESSION:     No acute osseous abnormality.     Degenerative changes as described.       Component      Latest Ref Rng 9/25/2023 10/20/2023   ANTI-NUCLEAR ANTIBODY (ELIN)      Negative  Negative     Rheumatoid Factor (RF)      <14.0 IU/mL  <10.0    C-Reactive Protein      0 - 10 mg/L  53 (H)    Sed Rate      0 - 30 mm/hr  13       Legend:  (H) High      Impressions and Plans:    Bala Mejia is a 67 y/o male with clinical s/s PMR    I have reviewed and discussed plan    At this time no other s/s GCA    + tj OA hands     Reviewed labs, ELIN, RF normal     CRP elevated    Check CCP Ab, CRP    D/c NSAIDs    Start him on 15 mg oral prednisone    RTC 6 weeks              Thank you for involving me in this patient's care.        Henrry Vinson MD  [facility]      I have spent a total time of 65 minutes on 03/26/24 in caring for this patient including Diagnostic results, Risks and benefits of tx options, Instructions for management, Importance of tx compliance, Risk factor reductions, Impressions, Counseling / Coordination of care, Documenting in the medical record, Reviewing / ordering tests, medicine, procedures  , and Obtaining or reviewing history  .

## 2024-05-02 ENCOUNTER — VBI (OUTPATIENT)
Dept: ADMINISTRATIVE | Facility: OTHER | Age: 69
End: 2024-05-02

## 2024-05-02 NOTE — TELEPHONE ENCOUNTER
05/02/24 1:49 PM     VB [employer] SmartForm used to document caregap status.    Franck Hernandez MA   
No

## 2024-05-10 ENCOUNTER — OFFICE VISIT (OUTPATIENT)
Dept: RHEUMATOLOGY | Facility: CLINIC | Age: 69
End: 2024-05-10
Payer: COMMERCIAL

## 2024-05-10 VITALS
DIASTOLIC BLOOD PRESSURE: 62 MMHG | OXYGEN SATURATION: 98 % | SYSTOLIC BLOOD PRESSURE: 122 MMHG | WEIGHT: 199 LBS | BODY MASS INDEX: 31.98 KG/M2 | HEART RATE: 66 BPM | HEIGHT: 66 IN

## 2024-05-10 DIAGNOSIS — M35.3 PMR (POLYMYALGIA RHEUMATICA) (HCC): Primary | ICD-10-CM

## 2024-05-10 PROCEDURE — G2211 COMPLEX E/M VISIT ADD ON: HCPCS | Performed by: INTERNAL MEDICINE

## 2024-05-10 PROCEDURE — 99214 OFFICE O/P EST MOD 30 MIN: CPT | Performed by: INTERNAL MEDICINE

## 2024-05-10 RX ORDER — PREDNISONE 1 MG/1
4 TABLET ORAL DAILY
Qty: 120 TABLET | Refills: 6 | Status: SHIPPED | OUTPATIENT
Start: 2024-05-10

## 2024-05-10 NOTE — PROGRESS NOTES
HPI: Bala Mejia is a 69 y/o male who presents for further evaluation arthralgias. He has past medical history HTN, HLD    Onset of symptoms 9/23. Started with b/l hands, pain and stiffness    Then progressed to b/l shoulders. Feels may have been related to picking up heavy water case.    Excellent response 15 mg oral prednisone, shoulders and hands much better    No headaches, blurry or double vision, jaw claudication            --------------------------------------------------------------------------------------------------------        ROS:        - for: Fevers, Chills or sweats.  No HAs or scalp tenderness.  No jaw claudication.  No acute visual or eye changes.  No dry eyes.  No auditory complaints.  No oral lesions or ulcers.  No dry mouth.  No sore throat or cough.  No chest pains or palpitations.  No shortness of breath, dyspnea on exertion or wheezing.  No hemotpysis.  No abdominal pain, GERD symptoms, diarrhea or constipation.  No urinary complaints.  No numbness, tingling or weakness.  No rashes.    All other ROS was reviewed and negative except as above         --------------------------------------------------------------------------------------------------------    Past Medical History    HTN  HLD        Past Surgical History    No surgeries         Family History    Family History   Problem Relation Age of Onset    Hypertension Mother     Diabetes Father     Parkinsonism Father     Heart disease Sister         coronary heart disease(CHD)    Heart attack Sister 58        myocardial infarction            Social History    Social History     Tobacco Use    Smoking status: Every Day     Types: Cigars     Passive exposure: Past    Smokeless tobacco: Never    Tobacco comments:     5 cigars a day for 30 years.     Vaping Use    Vaping status: Never Used   Substance Use Topics    Alcohol use: Not Currently     Comment: pt used to drink a lot but he stopped in september    Drug use: Never     Retired  "furniture factory     Allergies    No Known Allergies      Medications    Current Outpatient Medications   Medication Instructions    amLODIPine (NORVASC) 5 mg, Oral, Daily    diclofenac (VOLTAREN) 75 mg, Oral, 2 times daily    lisinopril (ZESTRIL) 40 mg, Oral, Daily    predniSONE 15 mg, Oral, Daily    rosuvastatin (CRESTOR) 10 MG tablet TAKE 1 TABLET BY MOUTH EVERY DAY          Physical Exam    /62   Pulse 66   Ht 5' 6\" (1.676 m)   Wt 90.3 kg (199 lb)   SpO2 98%   BMI 32.12 kg/m²     GEN: AAO, No apparent distress.  Patient is well developed.  HEENT:  Pupils are equal, round and reactive.  Sclera are clear.  Fundoscopic exam is normal.  External ears are without lesions.  Oral pharynx is clear of ulcers or other lesions.  MMM.   NECK:  Supple.  There is no adenopathy appreciable in anterior or posterior cervical chains or supraclavicularly.  JVP is normal.    HEART: Regular rate and rhythm.  There is no appreciable murmur, gallop or rub.  LUNGS: Clear to auscultation.  ABD:  Soft, without tenderness, rebound or guarding.  No appreciable organomegally.  NEURO: Speech and cognition are normal.  Strength is 5/5 throughout.  Tone is normal.  DTRs are 2/4 at the knees, ankles and elbows.  Gait is normal.  SKIN: There are no rashes or lesions    MUSCULOSKELETAL:   + tj OA hands      ________________________________________________________________________          Results Review    Study Result    Narrative & Impression   LEFT HAND     INDICATION:   M25.50: Pain in unspecified joint  M79.641: Pain in right hand  M79.642: Pain in left hand.     COMPARISON:  None     VIEWS:  XR HAND 3+ VW LEFT        For the purposes of institution wide universal language the following terms will apply: (thumb=1st digit/finger, index finger=2nd digit/finger, long finger=3rd digit/finger, ring=4th digit/finger and small finger=5th digit/finger)     FINDINGS:     There is no acute fracture or dislocation.     Scattered " degenerative spurring along the interphalangeal joints. Slight subluxation at the second distal interphalangeal joint towards the ulnar aspect probably chronic. Associated severe degenerative changes. Small bony exostosis at the ulnar aspect of   the fourth middle phalanx proximal aspect.     No lytic or blastic osseous lesion.     Soft tissues are unremarkable.     IMPRESSION:     No acute osseous abnormality.     Degenerative changes as described.       Component      Latest Ref Rng 9/25/2023 10/20/2023   ANTI-NUCLEAR ANTIBODY (ELIN)      Negative  Negative     Rheumatoid Factor (RF)      <14.0 IU/mL  <10.0    C-Reactive Protein      0 - 10 mg/L  53 (H)    Sed Rate      0 - 30 mm/hr  13       Legend:  (H) High      Impressions and Plans:    Bala Mejia is a 67 y/o male with clinical s/s PMR    CCP and CRP ordered last visit (done at [facility]) He will fax me the results    At this time no other s/s GCA    + tj OA hands     Reviewed labs, ELIN, RF normal     CRP elevated    Taper prednisone 12.5 mg X 3 weeks , 10 mg X 3 weeks then taper by 1 mg every 3 weeks till dose 5 mg     RTC 3 months              Thank you for involving me in this patient's care.        Henrry Vinson MD  [facility]

## 2024-07-04 DIAGNOSIS — I10 ESSENTIAL HYPERTENSION: ICD-10-CM

## 2024-07-05 RX ORDER — LISINOPRIL 40 MG/1
40 TABLET ORAL DAILY
Qty: 100 TABLET | Refills: 1 | Status: SHIPPED | OUTPATIENT
Start: 2024-07-05

## 2024-08-31 DIAGNOSIS — I10 ESSENTIAL HYPERTENSION: ICD-10-CM

## 2024-09-01 RX ORDER — AMLODIPINE BESYLATE 5 MG/1
5 TABLET ORAL DAILY
Qty: 90 TABLET | Refills: 1 | Status: SHIPPED | OUTPATIENT
Start: 2024-09-01

## 2024-09-11 ENCOUNTER — OFFICE VISIT (OUTPATIENT)
Dept: RHEUMATOLOGY | Facility: CLINIC | Age: 69
End: 2024-09-11
Payer: COMMERCIAL

## 2024-09-11 VITALS
OXYGEN SATURATION: 97 % | WEIGHT: 196 LBS | DIASTOLIC BLOOD PRESSURE: 68 MMHG | BODY MASS INDEX: 31.5 KG/M2 | SYSTOLIC BLOOD PRESSURE: 132 MMHG | HEIGHT: 66 IN | HEART RATE: 81 BPM

## 2024-09-11 DIAGNOSIS — M19.049 ARTHRITIS PAIN OF HAND: Primary | ICD-10-CM

## 2024-09-11 PROCEDURE — G2211 COMPLEX E/M VISIT ADD ON: HCPCS | Performed by: INTERNAL MEDICINE

## 2024-09-11 PROCEDURE — 99214 OFFICE O/P EST MOD 30 MIN: CPT | Performed by: INTERNAL MEDICINE

## 2024-09-11 RX ORDER — PREDNISONE 5 MG/1
5 TABLET ORAL DAILY
Qty: 30 TABLET | Refills: 11 | Status: SHIPPED | OUTPATIENT
Start: 2024-09-11

## 2024-09-11 NOTE — PROGRESS NOTES
HPI: Bala Mejia is a 69 y/o male who presents for further evaluation arthralgias. He has past medical history HTN, HLD    Doing well on prednisone taper, he is down to 6 mg daily and doing well CRP was 1 and CCP normal (scanned)    Onset of symptoms 9/23. Started with b/l hands, pain and stiffness    Then progressed to b/l shoulders. Feels may have been related to picking up heavy water case.    Excellent response 15 mg oral prednisone, shoulders and hands much better    No headaches, blurry or double vision, jaw claudication            --------------------------------------------------------------------------------------------------------        ROS:        - for: Fevers, Chills or sweats.  No HAs or scalp tenderness.  No jaw claudication.  No acute visual or eye changes.  No dry eyes.  No auditory complaints.  No oral lesions or ulcers.  No dry mouth.  No sore throat or cough.  No chest pains or palpitations.  No shortness of breath, dyspnea on exertion or wheezing.  No hemotpysis.  No abdominal pain, GERD symptoms, diarrhea or constipation.  No urinary complaints.  No numbness, tingling or weakness.  No rashes.    All other ROS was reviewed and negative except as above         --------------------------------------------------------------------------------------------------------    Past Medical History    HTN  HLD        Past Surgical History    No surgeries         Family History    Family History   Problem Relation Age of Onset    Hypertension Mother     Diabetes Father     Parkinsonism Father     Heart disease Sister         coronary heart disease(CHD)    Heart attack Sister 58        myocardial infarction            Social History    Social History     Tobacco Use    Smoking status: Every Day     Types: Cigars     Passive exposure: Past    Smokeless tobacco: Never    Tobacco comments:     5 cigars a day for 30 years.     Vaping Use    Vaping status: Never Used   Substance Use Topics    Alcohol use: Not  "Currently     Comment: occ, pt used to drink a lot but he stopped in september    Drug use: Never     Retired furniture factory     Allergies    No Known Allergies      Medications    Current Outpatient Medications   Medication Instructions    amLODIPine (NORVASC) 5 mg, Oral, Daily    diclofenac (VOLTAREN) 75 mg, Oral, 2 times daily    lisinopril (ZESTRIL) 40 mg, Oral, Daily    predniSONE 15 mg, Oral, Daily    predniSONE 4 mg, Oral, Daily    rosuvastatin (CRESTOR) 10 MG tablet TAKE 1 TABLET BY MOUTH EVERY DAY          Physical Exam    /68   Pulse 81   Ht 5' 6\" (1.676 m)   Wt 88.9 kg (196 lb)   SpO2 97%   BMI 31.64 kg/m²     GEN: AAO, No apparent distress.  Patient is well developed.  HEENT:  Pupils are equal, round and reactive.  Sclera are clear.  Fundoscopic exam is normal.  External ears are without lesions.  Oral pharynx is clear of ulcers or other lesions.  MMM.   NECK:  Supple.  There is no adenopathy appreciable in anterior or posterior cervical chains or supraclavicularly.  JVP is normal.    HEART: Regular rate and rhythm.  There is no appreciable murmur, gallop or rub.  LUNGS: Clear to auscultation.  ABD:  Soft, without tenderness, rebound or guarding.  No appreciable organomegally.  NEURO: Speech and cognition are normal.  Strength is 5/5 throughout.  Tone is normal.  DTRs are 2/4 at the knees, ankles and elbows.  Gait is normal.  SKIN: There are no rashes or lesions    MUSCULOSKELETAL:   + tj OA hands      ________________________________________________________________________          Results Review    Study Result    Narrative & Impression   LEFT HAND     INDICATION:   M25.50: Pain in unspecified joint  M79.641: Pain in right hand  M79.642: Pain in left hand.     COMPARISON:  None     VIEWS:  XR HAND 3+ VW LEFT        For the purposes of institution wide universal language the following terms will apply: (thumb=1st digit/finger, index finger=2nd digit/finger, long finger=3rd digit/finger, " ring=4th digit/finger and small finger=5th digit/finger)     FINDINGS:     There is no acute fracture or dislocation.     Scattered degenerative spurring along the interphalangeal joints. Slight subluxation at the second distal interphalangeal joint towards the ulnar aspect probably chronic. Associated severe degenerative changes. Small bony exostosis at the ulnar aspect of   the fourth middle phalanx proximal aspect.     No lytic or blastic osseous lesion.     Soft tissues are unremarkable.     IMPRESSION:     No acute osseous abnormality.     Degenerative changes as described.       Component      Latest Ref Rng 9/25/2023 10/20/2023   ANTI-NUCLEAR ANTIBODY (ELIN)      Negative  Negative     Rheumatoid Factor (RF)      <14.0 IU/mL  <10.0    C-Reactive Protein      0 - 10 mg/L  53 (H)    Sed Rate      0 - 30 mm/hr  13       Legend:  (H) High    Component      Latest Ref Rng 9/25/2023 10/20/2023   Sed Rate      0 - 30 mm/hr 11  13    ANTI-NUCLEAR ANTIBODY (ELIN)      Negative  Negative     Rheumatoid Factor (RF)      <14.0 IU/mL  <10.0    C-Reactive Protein      0 - 10 mg/L  53 (H)       Legend:  (H) High      Impressions and Plans:    Bala Mejia is a 67 y/o male with clinical s/s PMR    CCP and CRP ordered last visit (done at [facility]) CRP was 1 (down from 53) and CCP was negative    At this time no other s/s GCA    + tj OA hands     Continue taper by 1 mg every 3 weeks (he is on 6 mg now)    Check CRP    Right hand OA: refer to Orthopedics      RTC 4 months              Thank you for involving me in this patient's care.        Henrry Vinson MD  [facility]

## 2024-09-20 ENCOUNTER — OFFICE VISIT (OUTPATIENT)
Dept: OBGYN CLINIC | Facility: CLINIC | Age: 69
End: 2024-09-20
Payer: COMMERCIAL

## 2024-09-20 ENCOUNTER — APPOINTMENT (OUTPATIENT)
Dept: RADIOLOGY | Facility: CLINIC | Age: 69
End: 2024-09-20
Payer: COMMERCIAL

## 2024-09-20 VITALS
SYSTOLIC BLOOD PRESSURE: 151 MMHG | HEIGHT: 66 IN | DIASTOLIC BLOOD PRESSURE: 60 MMHG | BODY MASS INDEX: 31.82 KG/M2 | WEIGHT: 198 LBS

## 2024-09-20 DIAGNOSIS — M54.12 CERVICAL RADICULOPATHY: Primary | ICD-10-CM

## 2024-09-20 DIAGNOSIS — M54.12 CERVICAL RADICULOPATHY: ICD-10-CM

## 2024-09-20 DIAGNOSIS — R29.898 WEAKNESS OF RIGHT HAND: ICD-10-CM

## 2024-09-20 DIAGNOSIS — G56.21 CUBITAL TUNNEL SYNDROME ON RIGHT: ICD-10-CM

## 2024-09-20 PROCEDURE — 99214 OFFICE O/P EST MOD 30 MIN: CPT | Performed by: ORTHOPAEDIC SURGERY

## 2024-09-20 PROCEDURE — 72050 X-RAY EXAM NECK SPINE 4/5VWS: CPT

## 2024-09-20 NOTE — PROGRESS NOTES
Assessment/Plan:  1. Cervical radiculopathy  XR spine cervical complete 4 or 5 vw non injury      2. Weakness of right hand  Ambulatory Referral to Orthopedic Surgery    XR spine cervical complete 4 or 5 vw non injury      3. Cubital tunnel syndrome on right            Subjective history, physical examination performed, diagnostic imaging reviewed at today's visit  Diagnosis was discussed- cubital tunnel syndrome and cervical radiculopathy, given Wartenburg's escape sign.  Treatment options were discussed which included nonoperative and operative treatment.    Risks, benefits, and realistic expectations for treatment options were discussed.    The patient was given an opportunity to ask questions.  Questions were answered to the patient's satisfaction.    Through shared decision making, the patient decided to move forward with  spine and pain management consultation for cervical radiculopathy.  Patient is on prednisone currently prescribed by rheumatologist.  For the cubital tunnel- therapy for splinting and nerve glide.           cc: my pinky finger sticks out and I have numbness     Subjective:   Bala Mejia is a left hand dominant 68 y.o. male who presents for evaluation of right hand numbness, tingling, and decreased function of small finger. Patient states that he initially noticed this presentation approximately 1 year ago, without associated injury. He demonstrates that he has difficulty performing adduction of the small finger. He is able to perform this motion with all the other digits of the right hand. He states that he sometimes wakes up in the morning with numbness and tingling affecting the small finger and ring fingers of the right hand, and has lost  strength as compared to contralateral upper extremity. He was evaluated by rheumatology, and is currently undergoing tapered prednisone treatment for osteoarthritis. He states that this treatment has been successful at eliminating his pain, but he  continues to have right hand weakness as previously described. He denies any associated bruising, swelling, or mechanical clicking and locking.      Review of Systems   Constitutional:  Negative for chills, fever and unexpected weight change.   HENT:  Negative for hearing loss, nosebleeds and sore throat.    Eyes:  Negative for pain, redness and visual disturbance.   Respiratory:  Negative for cough, shortness of breath and wheezing.    Cardiovascular:  Negative for chest pain, palpitations and leg swelling.   Gastrointestinal:  Negative for abdominal pain, nausea and vomiting.   Endocrine: Negative for polydipsia and polyuria.   Genitourinary:  Negative for dysuria and hematuria.   Musculoskeletal:         As noted in HPI   Skin:  Negative for rash and wound.   Neurological:  Positive for numbness (Right small finger, right ring finger). Negative for dizziness and headaches.   Psychiatric/Behavioral:  Negative for decreased concentration and suicidal ideas. The patient is not nervous/anxious.        History reviewed. No pertinent past medical history.    History reviewed. No pertinent surgical history.    Family History   Problem Relation Age of Onset    Hypertension Mother     Diabetes Father     Parkinsonism Father     Heart disease Sister         coronary heart disease(CHD)    Heart attack Sister 58        myocardial infarction       Social History     Occupational History    Occupation: [employer]   Tobacco Use    Smoking status: Every Day     Types: Cigars     Passive exposure: Past    Smokeless tobacco: Never    Tobacco comments:     5 cigars a day for 30 years.     Vaping Use    Vaping status: Never Used   Substance and Sexual Activity    Alcohol use: Not Currently     Comment: occ, pt used to drink a lot but he stopped in september    Drug use: Never    Sexual activity: Not Currently         Current Outpatient Medications:     amLODIPine (NORVASC) 5 mg tablet, TAKE 1 TABLET (5 MG TOTAL) BY MOUTH  DAILY., Disp: 90 tablet, Rfl: 1    lisinopril (ZESTRIL) 40 mg tablet, TAKE 1 TABLET BY MOUTH EVERY DAY, Disp: 100 tablet, Rfl: 1    predniSONE 5 mg tablet, Take 1 tablet (5 mg total) by mouth daily, Disp: 30 tablet, Rfl: 11    rosuvastatin (CRESTOR) 10 MG tablet, TAKE 1 TABLET BY MOUTH EVERY DAY, Disp: 90 tablet, Rfl: 1    diclofenac (VOLTAREN) 75 mg EC tablet, Take 1 tablet (75 mg total) by mouth 2 (two) times a day (Patient not taking: Reported on 5/10/2024), Disp: 30 tablet, Rfl: 5    predniSONE 1 mg tablet, Take 4 tablets (4 mg total) by mouth daily (Patient not taking: Reported on 9/20/2024), Disp: 120 tablet, Rfl: 6    predniSONE 5 mg tablet, Take 3 tablets (15 mg total) by mouth daily (Patient not taking: Reported on 9/20/2024), Disp: 90 tablet, Rfl: 3    No Known Allergies    Objective:  Vitals:    09/20/24 0848   BP: 151/60       The patient was awake, alert, and oriented to person, place, and time.  No acute distress.  Normocephalic.  EOMI.  Mucous membranes moist.  Normal respiratory effort.    Right upper extremity-   Examination of the affected extremity was compared to the unaffected extremity.  Skin was intact.  No swelling or ecchymosis.  No deformity.  Hand and fingers were warm and well-perfused.  Capillary refill was brisk.  Full active range of motion of the elbows, forearms, wrists, and fingers.  5/5 elbow flexors/extensors and wrist flexor/extensors. 3 -/5 small finger adduction, 4/5 small finger abduction, 4/5 small finger FDS and FDP. Compound finger flexion to contact distal palmar crease with all fingers.    -Spurling's during exam (patient has been on prednisone)      Imaging/Diagnostic Studies:    Attending Physician has personally reviewed pertinent imaging in PACS, impression is as follows:    Review of radiographic series taken 9/25/2023 of the right hand shows osteoarthritic changes of multiple PIP and DIP joints of the hand. No sign of acute osseous abnormality or malalignment is  present.    Review of radiographic series taken 9/20/2024 of the cervical spine      This document was created using speech voice recognition software.   Grammatical errors, random word insertions, pronoun errors, and incomplete sentences are an occasional consequence of this system due to software limitations, ambient noise, and hardware issues.   Any formal questions or concerns about content, text, or information contained within the body of this dictation should be directly addressed to the provider for clarification.     Scribe Attestation      I,:  Oliver Meade am acting as a scribe while in the presence of the attending physician.:       I,:  Allison Delcid MD personally performed the services described in this documentation    as scribed in my presence.:

## 2024-09-24 ENCOUNTER — CONSULT (OUTPATIENT)
Dept: PAIN MEDICINE | Facility: CLINIC | Age: 69
End: 2024-09-24
Payer: COMMERCIAL

## 2024-09-24 VITALS
BODY MASS INDEX: 31.64 KG/M2 | HEART RATE: 67 BPM | DIASTOLIC BLOOD PRESSURE: 79 MMHG | SYSTOLIC BLOOD PRESSURE: 135 MMHG | TEMPERATURE: 98.2 F | WEIGHT: 196 LBS

## 2024-09-24 DIAGNOSIS — M50.30 DEGENERATIVE CERVICAL DISC: ICD-10-CM

## 2024-09-24 DIAGNOSIS — R20.0 NUMBNESS OF FINGER: ICD-10-CM

## 2024-09-24 DIAGNOSIS — R29.898 WEAKNESS OF RIGHT HAND: Primary | ICD-10-CM

## 2024-09-24 PROCEDURE — 99204 OFFICE O/P NEW MOD 45 MIN: CPT | Performed by: PHYSICIAN ASSISTANT

## 2024-09-24 NOTE — PATIENT INSTRUCTIONS
Consider cervical MRI if develops neck pain or arm pain.  For now, proceed with the occupation therapy eval and continue conservative treatments.

## 2024-09-24 NOTE — PROGRESS NOTES
Assessment  1. Weakness of right hand    2. Numbness of finger    3. Degenerative cervical disc        Plan  While the patient was in the office today, I did have a thorough conversation regarding their chronic pain syndrome, medication management, and treatment plan options.    At this point in time, the patient denies any neck pain or upper extremity pain.  His only symptom is issue with mobility of the right fifth digit and numbness.  I do not think a cervical spine MRI is indicated at this time.  If patient develops any neck pain, and/or radicular pain then we could consider ordering the MRI.  For now, the patient is comfortable proceeding with the Occupational Therapy evaluation as scheduled this Friday and splinting.    Continue to follow-up with rheumatology as scheduled.    He will follow-up with us on a as needed basis.    My recommendations and treatment recommendations were discussed in detail with the patient who verbalized understanding and had no further questions.  Discharge instructions were provided. I personally saw and examined the patient and I agree with the above discussed plan of care.    No orders of the defined types were placed in this encounter.    No orders of the defined types were placed in this encounter.      History of Present Illness    Bala Mejia is a 68 y.o. male presents today for the initial pain management consultation with complaints of decreased range of motion and numbness in the right fifth digit.  Symptoms have been present for about 12 months.  He denies any specific injury/trauma/inciting event.  Patient has 0 pain and describes his symptoms as an occasional numbness.  Patient denies neck pain and denies any symptoms consistent with cervical radiculopathy.  He follows with rheumatology in regards to polymyalgia rheumatica and is on a tapering course of prednisone.  Patient has a follow-up visit with rheumatology in January.  He was recently seen and evaluated by  orthopedic hand specialist Dr. Delcid.  He was diagnosed with cubital tunnel syndrome and is being evaluated by Occupational Therapy this Friday for therapy and splinting.  Patient was referred here to discuss the possibility of this emanating from the cervical spine.  X-ray of the cervical spine was done which revealed degenerative changes.    I have personally reviewed and/or updated the patient's past medical history, past surgical history, family history, social history, current medications, allergies, and vital signs today.     Review of Systems    Patient Active Problem List   Diagnosis    Benign localized hyperplasia of prostate    Essential hypertension    Hyperlipidemia, unspecified    Nicotine dependence    Nummular dermatitis       History reviewed. No pertinent past medical history.    History reviewed. No pertinent surgical history.    Family History   Problem Relation Age of Onset    Hypertension Mother     Diabetes Father     Parkinsonism Father     Heart disease Sister         coronary heart disease(CHD)    Heart attack Sister 58        myocardial infarction       Social History     Occupational History    Occupation: [employer]   Tobacco Use    Smoking status: Every Day     Types: Cigars     Passive exposure: Past    Smokeless tobacco: Never    Tobacco comments:     5 cigars a day for 30 years.     Vaping Use    Vaping status: Never Used   Substance and Sexual Activity    Alcohol use: Not Currently     Comment: occ, pt used to drink a lot but he stopped in september    Drug use: Never    Sexual activity: Not Currently       Current Outpatient Medications on File Prior to Visit   Medication Sig    amLODIPine (NORVASC) 5 mg tablet TAKE 1 TABLET (5 MG TOTAL) BY MOUTH DAILY.    lisinopril (ZESTRIL) 40 mg tablet TAKE 1 TABLET BY MOUTH EVERY DAY    predniSONE 5 mg tablet Take 1 tablet (5 mg total) by mouth daily    rosuvastatin (CRESTOR) 10 MG tablet TAKE 1 TABLET BY MOUTH EVERY DAY    diclofenac  (VOLTAREN) 75 mg EC tablet Take 1 tablet (75 mg total) by mouth 2 (two) times a day (Patient not taking: Reported on 5/10/2024)    predniSONE 1 mg tablet Take 4 tablets (4 mg total) by mouth daily (Patient not taking: Reported on 9/20/2024)    predniSONE 5 mg tablet Take 3 tablets (15 mg total) by mouth daily (Patient not taking: Reported on 9/20/2024)     No current facility-administered medications on file prior to visit.       No Known Allergies    Physical Exam    /79 (BP Location: Right arm, Patient Position: Sitting, Cuff Size: Large)   Pulse 67   Temp 98.2 °F (36.8 °C)   Wt 88.9 kg (196 lb)   BMI 31.64 kg/m²     Constitutional: normal, well developed, well nourished, alert, in no distress and non-toxic and no overt pain behavior.  Eyes: anicteric  HEENT: grossly intact  Neck: supple, symmetric, trachea midline and no masses   Pulmonary:even and unlabored  Cardiovascular:No edema or pitting edema present  Skin:Normal without rashes or lesions and well hydrated  Psychiatric:Mood and affect appropriate  Neurologic:Cranial Nerves II-XII grossly intact  Musculoskeletal: Decreased right fifth digit adduction, cervical spine is nontender, full range of motion, negative Spurling's.    Imaging     LEFT HAND @ 9-25-23     INDICATION:   M25.50: Pain in unspecified joint  M79.641: Pain in right hand  M79.642: Pain in left hand.     COMPARISON:  None     VIEWS:  XR HAND 3+ VW LEFT        For the purposes of institution wide universal language the following terms will apply: (thumb=1st digit/finger, index finger=2nd digit/finger, long finger=3rd digit/finger, ring=4th digit/finger and small finger=5th digit/finger)     FINDINGS:     There is no acute fracture or dislocation.     Scattered degenerative spurring along the interphalangeal joints. Slight subluxation at the second distal interphalangeal joint towards the ulnar aspect probably chronic. Associated severe degenerative changes. Small bony exostosis at  the ulnar aspect of   the fourth middle phalanx proximal aspect.     No lytic or blastic osseous lesion.     Soft tissues are unremarkable.     IMPRESSION:     No acute osseous abnormality.     Degenerative changes as described.      RIGHT HAND @ 9-25-23     INDICATION:   M25.50: Pain in unspecified joint  M79.641: Pain in right hand  M79.642: Pain in left hand.     COMPARISON:  None     VIEWS:  XR HAND 3+ VW RIGHT        For the purposes of institution wide universal language the following terms will apply: (thumb=1st digit/finger, index finger=2nd digit/finger, long finger=3rd digit/finger, ring=4th digit/finger and small finger=5th digit/finger)     FINDINGS:     There is no acute fracture or dislocation.     Scattered mild degenerative changes along the interphalangeal joints.     No lytic or blastic osseous lesion.     Soft tissues are unremarkable.     IMPRESSION:     No acute osseous abnormality.     Degenerative changes as described.

## 2024-09-27 ENCOUNTER — EVALUATION (OUTPATIENT)
Dept: OCCUPATIONAL THERAPY | Facility: CLINIC | Age: 69
End: 2024-09-27
Payer: COMMERCIAL

## 2024-09-27 DIAGNOSIS — G56.21 CUBITAL TUNNEL SYNDROME ON RIGHT: ICD-10-CM

## 2024-09-27 PROCEDURE — 97760 ORTHOTIC MGMT&TRAING 1ST ENC: CPT | Performed by: OCCUPATIONAL THERAPIST

## 2024-09-27 PROCEDURE — 97165 OT EVAL LOW COMPLEX 30 MIN: CPT | Performed by: OCCUPATIONAL THERAPIST

## 2024-09-27 NOTE — PROGRESS NOTES
OT Evaluation     Today's date: 2024  Patient name: Bala Mejia  : 1955  MRN: [id number]  Referring provider: Allison Delcid,*  Dx:   Encounter Diagnosis     ICD-10-CM    1. Cubital tunnel syndrome on right  G56.21 Ambulatory Referral to Occupational Therapy                     Assessment  Impairments: impaired physical strength and lacks appropriate home exercise program    Assessment details: Pt would like HEP and splint  only .     Bala has intrinsic weakness . He has decreased 2 point in the ulnar distribution . He will be having an MRI and then f/u with his doctor .    Plan  Patient would benefit from: custom splinting and OT eval    Plan details: No formal treatment     Subjective Evaluation    History of Present Illness  Date of onset: 2023  Mechanism of injury: Bala reports progressive onset of R hand paresthesias  and intrinsic weakness last September. He has an MRI scheduled.   Patient Goals  Patient goals for therapy: independence with ADLs/IADLs, increased strength and increased motion    Pain  Current pain ratin  At best pain ratin  At worst pain ratin  Location: R ulnar dist  Quality: paresthesias.  Progression: worsening    Social Support  Steps to enter house: no  Stairs in house: no   Lives in: one-story house  Lives with: adult children    Employment status: not working  Hand dominance: ambidextrous    Treatments  No previous or current treatments    Objective     Observations     Additional Observation Details  Atrophy 1st DI and hypothenar     Neurological Testing     Additional Neurological Details  2 point 6 mm ulnar    Active Range of Motion     Right Elbow   Normal active range of motion    Right Wrist   Normal active range of motion    Additional Active Range of Motion Details  Full digit flex/ext   Unable to adduct  R SF     Strength/Myotome Testing     Right Shoulder     Planes of Motion   External rotation at 0°: 4+     Right Elbow   Normal  strength    Left Wrist/Hand      (2nd hand position)     Trial 1: 58    Thumb Strength  Key/Lateral Pinch     Trial 1: 17  Palmar/Three-Point Pinch     Trial 1: 12    Right Wrist/Hand   Normal wrist strength     (2nd hand position)     Trial 1: 58    Thumb Strength   Key/Lateral Pinch     Trial 1: 12  Palmar/Three-Point Pinch     Trial 1: 10    Tests     Right Elbow   Positive Tinel's sign (cubital tunnel).     Right Wrist/Hand   Negative Froment's sign.            Precautions: universal       Manuals 9/27  IE            Custom R night elbow orthosis 14m                                                   Neuro Re-Ed                                                                                                        Ther Ex                                                                                                                     Ther Activity                                       Gait Training                                       Modalities

## 2024-10-16 DIAGNOSIS — I10 ESSENTIAL HYPERTENSION: ICD-10-CM

## 2024-10-16 DIAGNOSIS — E78.2 MIXED HYPERLIPIDEMIA: ICD-10-CM

## 2024-10-17 ENCOUNTER — TELEPHONE (OUTPATIENT)
Dept: FAMILY MEDICINE CLINIC | Facility: CLINIC | Age: 69
End: 2024-10-17

## 2024-10-17 RX ORDER — ROSUVASTATIN CALCIUM 10 MG/1
TABLET, COATED ORAL
Qty: 30 TABLET | Refills: 0 | Status: SHIPPED | OUTPATIENT
Start: 2024-10-17

## 2024-10-17 NOTE — TELEPHONE ENCOUNTER
LVM for patient regarding rosuvastatin refill and need for check up/AWV appointment and lab work. Asked pt to give a call back to schedule and let us know what lab orders should be placed for.

## 2024-11-25 DIAGNOSIS — I10 ESSENTIAL HYPERTENSION: ICD-10-CM

## 2024-11-25 DIAGNOSIS — E78.2 MIXED HYPERLIPIDEMIA: ICD-10-CM

## 2024-11-25 RX ORDER — ROSUVASTATIN CALCIUM 10 MG/1
10 TABLET, COATED ORAL DAILY
Qty: 90 TABLET | Refills: 0 | Status: SHIPPED | OUTPATIENT
Start: 2024-11-25

## 2024-11-25 NOTE — TELEPHONE ENCOUNTER
Patient made appointment first available is 01/ . Please refill for 90 days. Last time was courtesy refill.until appointment was scheduled. Out of medication for 1 week     Reason for call:   [x] Refill   [] Prior Auth  [] Other:     Office:   [x] PCP/Provider - Marylou Fountain  [] Specialty/Provider -     Medication: Rosuvastatin     Dose/Frequency: 10 mg Daily     Quantity: 90    Pharmacy: [facility] [place] [address]    Does the patient have enough for 3 days?   [] Yes   [x] No - Send as HP to POD

## 2024-12-04 LAB — CRP SERPL-MCNC: 1 MG/L (ref 0–10)

## 2024-12-21 DIAGNOSIS — M35.3 PMR (POLYMYALGIA RHEUMATICA) (HCC): ICD-10-CM

## 2024-12-23 RX ORDER — PREDNISONE 1 MG/1
4 TABLET ORAL DAILY
Qty: 120 TABLET | Refills: 6 | Status: SHIPPED | OUTPATIENT
Start: 2024-12-23

## 2025-01-05 DIAGNOSIS — I10 ESSENTIAL HYPERTENSION: ICD-10-CM

## 2025-01-07 RX ORDER — LISINOPRIL 40 MG/1
40 TABLET ORAL DAILY
Qty: 90 TABLET | Refills: 0 | Status: SHIPPED | OUTPATIENT
Start: 2025-01-07

## 2025-01-14 ENCOUNTER — OFFICE VISIT (OUTPATIENT)
Dept: RHEUMATOLOGY | Facility: CLINIC | Age: 70
End: 2025-01-14

## 2025-01-14 VITALS
DIASTOLIC BLOOD PRESSURE: 70 MMHG | WEIGHT: 200 LBS | HEART RATE: 79 BPM | SYSTOLIC BLOOD PRESSURE: 134 MMHG | OXYGEN SATURATION: 97 % | HEIGHT: 66 IN | BODY MASS INDEX: 32.14 KG/M2

## 2025-01-14 DIAGNOSIS — G56.00 CARPAL TUNNEL SYNDROME, UNSPECIFIED LATERALITY: ICD-10-CM

## 2025-01-14 DIAGNOSIS — M19.041 PRIMARY OSTEOARTHRITIS OF BOTH HANDS: ICD-10-CM

## 2025-01-14 DIAGNOSIS — M35.3 PMR (POLYMYALGIA RHEUMATICA) (HCC): Primary | ICD-10-CM

## 2025-01-14 DIAGNOSIS — G56.20 CUBITAL TUNNEL SYNDROME, UNSPECIFIED LATERALITY: ICD-10-CM

## 2025-01-14 DIAGNOSIS — M51.379 DEGENERATION OF INTERVERTEBRAL DISC OF LUMBOSACRAL REGION, UNSPECIFIED WHETHER PAIN PRESENT: ICD-10-CM

## 2025-01-14 DIAGNOSIS — M19.042 PRIMARY OSTEOARTHRITIS OF BOTH HANDS: ICD-10-CM

## 2025-01-14 NOTE — PROGRESS NOTES
Name: Bala Mejia      : 1955      MRN: [id number]  Encounter Provider: Henrry Vinson MD  Encounter Date: 2025   Encounter department: [facility] RHEUMATOLOGY [facility]  :  Assessment & Plan  PMR (polymyalgia rheumatica) ([facility])    At this time he is doing well off prednisone (last dose was 24)  RF, CCP normal CRP was down from 53 to 1    + tj OA hands    Continue to monitor off prednisone   labs reviewed           Primary osteoarthritis of both hands  Tylenol as needed       Degeneration of intervertebral disc of lumbosacral region, unspecified whether pain present  F/u pain management       Carpal tunnel syndrome, unspecified laterality  F/u Orthopedics       Cubital tunnel syndrome, unspecified laterality  F/u Orthopedics           RTC 6 months        History of Present Illness   HPI  Bala Mejia is a 69 y.o. male who presents for further f/u PMR.    Since last visit he has been doing well on prednisone taper. He denies and PMR symptoms    CRP was 1 and CCP normal (scanned). RF was normal    Chronic cervical and lumbar radiculopathy follows with pain management    Also follows with Orthopedics with h/o carpal and cubital syndrome.       Social History     Socioeconomic History    Marital status:      Spouse name: Not on file    Number of children: 2    Years of education: Not on file    Highest education level: Not on file   Occupational History    Occupation: [employer]   Tobacco Use    Smoking status: Every Day     Types: Cigars     Passive exposure: Past    Smokeless tobacco: Never    Tobacco comments:     5 cigars a day for 30 years.     Vaping Use    Vaping status: Never Used   Substance and Sexual Activity    Alcohol use: Not Currently     Comment: occ, pt used to drink a lot but he stopped in september    Drug use: Never    Sexual activity: Not Currently   Other Topics Concern    Not on file   Social History Narrative    Sendentary lifestyle     , per [employer]    Working full-time     Social Drivers of Health     Financial Resource Strain: Low Risk  (8/9/2023)    Overall Financial Resource Strain (CARDIA)     Difficulty of Paying Living Expenses: Not hard at all   Food Insecurity: No Food Insecurity (1/22/2021)    Hunger Vital Sign     Worried About Running Out of Food in the Last Year: Never true     Ran Out of Food in the Last Year: Never true   Transportation Needs: No Transportation Needs (8/9/2023)    PRAPARE - Transportation     Lack of Transportation (Medical): No     Lack of Transportation (Non-Medical): No   Physical Activity: Inactive (1/22/2021)    Exercise Vital Sign     Days of Exercise per Week: 0 days     Minutes of Exercise per Session: 0 min   Stress: No Stress Concern Present (1/22/2021)    [employer] - Occupational Stress Questionnaire     Feeling of Stress : Not at all   Social Connections: Socially Isolated (1/22/2021)    Social Connection and Isolation Panel [NHANES]     Frequency of Communication with Friends and Family: More than three times a week     Frequency of Social Gatherings with Friends and Family: Once a week     Attends Anabaptist Services: Never     Active Member of Clubs or Organizations: No     Attends Club or Organization Meetings: Never     Marital Status:    Intimate Partner Violence: Not At Risk (12/6/2023)    Humiliation, Afraid, Rape, and Kick questionnaire     Fear of Current or Ex-Partner: No     Emotionally Abused: No     Physically Abused: No     Sexually Abused: No   Housing Stability: Not on file       Retired furniture factory     Results Review         Component  Ref Range & Units (hover) 12/3/24  8:09 AM 10/20/23  7:08 AM   C-Reactive Protein, Quant 1 53 High         Show images for XR hand 3+ vw left  Study Result    Narrative & Impression   LEFT HAND     INDICATION:   M25.50: Pain in unspecified joint  M79.641: Pain in right hand  M79.642: Pain in left  "hand.     COMPARISON:  None     VIEWS:  XR HAND 3+ VW LEFT        For the purposes of institution wide universal language the following terms will apply: (thumb=1st digit/finger, index finger=2nd digit/finger, long finger=3rd digit/finger, ring=4th digit/finger and small finger=5th digit/finger)     FINDINGS:     There is no acute fracture or dislocation.     Scattered degenerative spurring along the interphalangeal joints. Slight subluxation at the second distal interphalangeal joint towards the ulnar aspect probably chronic. Associated severe degenerative changes. Small bony exostosis at the ulnar aspect of   the fourth middle phalanx proximal aspect.     No lytic or blastic osseous lesion.     Soft tissues are unremarkable.     IMPRESSION:     No acute osseous abnormality.     Degenerative changes as described.             Review of Systems  Pertinent Medical History     He has past medical history HTN, HLD     Onset of symptoms 9/23. Started with b/l hands, pain and stiffness     Then progressed to b/l shoulders. Feels may have been related to picking up heavy water case.     Excellent response 15 mg oral prednisone, shoulders and hands much better     No headaches, blurry or double vision, jaw claudication     Objective   /70   Pulse 79   Ht 5' 6\" (1.676 m)   Wt 90.7 kg (200 lb)   SpO2 97%   BMI 32.28 kg/m²      Physical Exam    GEN: AAO, No apparent distress.  Patient is well developed.  HEENT:  Pupils are equal, round and reactive.  Sclera are clear.  Fundoscopic exam is normal.  External ears are without lesions.  Oral pharynx is clear of ulcers or other lesions.  MMM.   NECK:  Supple.  There is no adenopathy appreciable in anterior or posterior cervical chains or supraclavicularly.  JVP is normal.    HEART: Regular rate and rhythm.  There is no appreciable murmur, gallop or rub.  LUNGS: Clear to auscultation.  ABD:  Soft, without tenderness, rebound or guarding.  No appreciable " organomegally.  NEURO: Speech and cognition are normal.  Strength is 5/5 throughout.  Tone is normal.  DTRs are 2/4 at the knees, ankles and elbows.  Gait is normal.  SKIN: There are no rashes or lesions     MUSCULOSKELETAL:   + jt OA hands

## 2025-01-30 ENCOUNTER — OFFICE VISIT (OUTPATIENT)
Dept: FAMILY MEDICINE CLINIC | Facility: CLINIC | Age: 70
End: 2025-01-30
Payer: COMMERCIAL

## 2025-01-30 VITALS
HEART RATE: 68 BPM | HEIGHT: 66 IN | DIASTOLIC BLOOD PRESSURE: 78 MMHG | RESPIRATION RATE: 16 BRPM | TEMPERATURE: 98.3 F | BODY MASS INDEX: 32.27 KG/M2 | OXYGEN SATURATION: 98 % | SYSTOLIC BLOOD PRESSURE: 124 MMHG | WEIGHT: 200.8 LBS

## 2025-01-30 DIAGNOSIS — M35.3 PMR (POLYMYALGIA RHEUMATICA) (HCC): ICD-10-CM

## 2025-01-30 DIAGNOSIS — E78.2 MIXED HYPERLIPIDEMIA: ICD-10-CM

## 2025-01-30 DIAGNOSIS — Z12.11 SCREEN FOR COLON CANCER: ICD-10-CM

## 2025-01-30 DIAGNOSIS — F17.290 OTHER TOBACCO PRODUCT NICOTINE DEPENDENCE, UNCOMPLICATED: ICD-10-CM

## 2025-01-30 DIAGNOSIS — I10 ESSENTIAL HYPERTENSION: Primary | ICD-10-CM

## 2025-01-30 PROBLEM — N42.31 PROSTATIC INTRAEPITHELIAL NEOPLASIA: Status: ACTIVE | Noted: 2021-01-28

## 2025-01-30 PROCEDURE — 99214 OFFICE O/P EST MOD 30 MIN: CPT | Performed by: FAMILY MEDICINE

## 2025-01-30 RX ORDER — AMLODIPINE BESYLATE 5 MG/1
5 TABLET ORAL DAILY
Qty: 90 TABLET | Refills: 1 | Status: SHIPPED | OUTPATIENT
Start: 2025-01-30

## 2025-01-30 RX ORDER — LISINOPRIL 40 MG/1
40 TABLET ORAL DAILY
Qty: 90 TABLET | Refills: 1 | Status: SHIPPED | OUTPATIENT
Start: 2025-01-30

## 2025-01-30 RX ORDER — ROSUVASTATIN CALCIUM 10 MG/1
10 TABLET, COATED ORAL DAILY
Qty: 90 TABLET | Refills: 1 | Status: SHIPPED | OUTPATIENT
Start: 2025-01-30

## 2025-01-30 NOTE — PROGRESS NOTES
Name: Bala Mejia      : 1955      MRN: [id number]  Encounter Provider: Marylou Fountain DO  Encounter Date: 2025   Encounter department: [facility]  :  Assessment & Plan  Essential hypertension  The patient's blood pressure is well-controlled with his current medication.  He will go for the up-to-date lab work as indicated and we will follow-up with the results.  He will continue to work on improving his diet and exercise.  We will see him back as scheduled.  Orders:  •  CBC and differential; Future  •  Comprehensive metabolic panel; Future  •  LDL cholesterol, direct; Future  •  Lipid panel; Future  •  TSH, 3rd generation with Free T4 reflex; Future  •  UA (URINE) with reflex to Scope; Future  •  amLODIPine (NORVASC) 5 mg tablet; Take 1 tablet (5 mg total) by mouth daily  •  lisinopril (ZESTRIL) 40 mg tablet; Take 1 tablet (40 mg total) by mouth daily  •  rosuvastatin (CRESTOR) 10 MG tablet; Take 1 tablet (10 mg total) by mouth daily    Mixed hyperlipidemia  Patient will continue the current dose of his rosuvastatin.  We have made no changes today. The patient was counseled on healthy lifestyle interventions for weight loss and improving his cholesterol.  Discussed implementing dietary changes (smaller portions, higher protein, 4-5 servings of fruits and vegetables a day) as well as increasing activity (strength training and 150 minutes weekly of moderate exercise).    Orders:  •  CBC and differential; Future  •  Comprehensive metabolic panel; Future  •  LDL cholesterol, direct; Future  •  Lipid panel; Future  •  TSH, 3rd generation with Free T4 reflex; Future  •  UA (URINE) with reflex to Scope; Future  •  rosuvastatin (CRESTOR) 10 MG tablet; Take 1 tablet (10 mg total) by mouth daily    PMR (polymyalgia rheumatica) (HCC)  Patient is currently stable and has no symptoms and will continue to follow-up with rheumatology as scheduled.  Orders:  •  CBC and differential;  Future  •  Comprehensive metabolic panel; Future    Screen for colon cancer  Bala Mejia is refusing a colonoscopy for colon cancer screening.  he is aware of the risks of not screening including missing a pre cancerous polyp that could lead to the development of colon cancer and complications including death.  he is aware of this and is still declining a colonoscopy.  he is agreeable to do the Cologuard as an alternative.  We will prepare the paperwork and fax it to the company for a kit to be sent to the patient.  We will follow up with the results when available.    Orders:  •  Cologuard    Other tobacco product nicotine dependence, uncomplicated  Tobacco Cessation Counseling: Tobacco cessation counseling and education was provided. The patient is sincerely urged to quit consumption of tobacco. He is not ready to quit tobacco. The numerous health risks of tobacco consumption were discussed. If he decides to quit, there are a number of helpful adjunctive aids, and he can see me to discuss nicotine replacement therapy, chantix, or bupropion anytime in the future.. I spent 5 minutes on Tobacco Cessation counseling during today's visit.                Depression Screening and Follow-up Plan: Patient was screened for depression during today's encounter. They screened negative with a PHQ-2 score of 0.    Tobacco Cessation Counseling: Tobacco cessation counseling was provided. The patient is sincerely urged to quit consumption of tobacco. He is ready to quit tobacco.       Chief Complaint   Patient presents with   • Follow-up     Overdue check up       History of Present Illness   Bala Mejia is a 69 y.o. male presents today for follow-up of his hypertension, hyperlipidemia, and PMR.  He was seen the rheumatologist for the PMR and has recently finished taking his steroids.  He is feeling much better and feels his symptoms are well-controlled.  He is doing well on his medication.    The patient denies any chest pain,  "shortness of breath, or palpitations.  There is no WILKINS, PND, or orthopnea.  There is no edema.  There are no headaches or visual changes.  There is no lightheadedness, dizziness, or fainting spells.    There is back pain with lifting and standing- low back. There is no radiation.  There is no loss of bowel or bladder function or any numbness going down his legs.  It is very infrequent and is not inhibiting his activity level.        Hypertension  This is a chronic problem. The current episode started more than 1 year ago. The problem is unchanged. The problem is controlled. Pertinent negatives include no anxiety, blurred vision, chest pain, headaches, malaise/fatigue, neck pain, orthopnea, palpitations, peripheral edema, PND, shortness of breath or sweats.     Review of Systems   Constitutional: Negative.  Negative for malaise/fatigue.   HENT: Negative.     Eyes: Negative.  Negative for blurred vision.   Respiratory: Negative.  Negative for shortness of breath.    Cardiovascular: Negative.  Negative for chest pain, palpitations, orthopnea and PND.   Gastrointestinal: Negative.    Endocrine: Negative.    Genitourinary: Negative.    Musculoskeletal: Negative.  Negative for neck pain.   Skin: Negative.    Allergic/Immunologic: Negative.    Neurological: Negative.  Negative for headaches.   Hematological: Negative.    Psychiatric/Behavioral: Negative.         Objective   /78   Pulse 68   Temp 98.3 °F (36.8 °C) (Tympanic)   Resp 16   Ht 5' 6\" (1.676 m)   Wt 91.1 kg (200 lb 12.8 oz)   SpO2 98%   BMI 32.41 kg/m²      Physical Exam  Vitals and nursing note reviewed.   Constitutional:       General: He is not in acute distress.     Appearance: Normal appearance. He is well-developed. He is not diaphoretic.   HENT:      Head: Normocephalic and atraumatic.      Right Ear: Tympanic membrane, ear canal and external ear normal.      Left Ear: Tympanic membrane, ear canal and external ear normal.      Nose: No " congestion or rhinorrhea.      Mouth/Throat:      Mouth: Mucous membranes are moist.      Pharynx: Oropharynx is clear.   Eyes:      Extraocular Movements: Extraocular movements intact.      Pupils: Pupils are equal, round, and reactive to light.   Neck:      Thyroid: No thyromegaly.      Vascular: No JVD.      Trachea: No tracheal deviation.   Cardiovascular:      Rate and Rhythm: Normal rate and regular rhythm.      Heart sounds: Normal heart sounds. No murmur heard.     No friction rub. No gallop.   Pulmonary:      Effort: Pulmonary effort is normal. No respiratory distress.      Breath sounds: Normal breath sounds. No stridor. No wheezing or rales.   Chest:      Chest wall: No tenderness.   Abdominal:      General: Bowel sounds are normal. There is no distension.      Palpations: Abdomen is soft. There is no mass.      Tenderness: There is no abdominal tenderness. There is no guarding or rebound.   Musculoskeletal:         General: Normal range of motion.      Cervical back: Normal range of motion and neck supple.   Lymphadenopathy:      Cervical: No cervical adenopathy.   Skin:     General: Skin is warm and dry.      Coloration: Skin is not pale.      Findings: No erythema or rash.   Neurological:      Mental Status: He is alert and oriented to person, place, and time.      Cranial Nerves: No cranial nerve deficit.      Motor: No abnormal muscle tone.      Coordination: Coordination normal.      Deep Tendon Reflexes: Reflexes are normal and symmetric. Reflexes normal.       Administrative Statements   I have spent a total time of 20 minutes in caring for this patient on the day of the visit/encounter including Diagnostic results, Prognosis, Risks and benefits of tx options, Instructions for management, Patient and family education, Importance of tx compliance, Risk factor reductions, Impressions, Counseling / Coordination of care, Documenting in the medical record, Reviewing / ordering tests, medicine,  procedures  , and Obtaining or reviewing history  .

## 2025-01-30 NOTE — ASSESSMENT & PLAN NOTE
The patient's blood pressure is well-controlled with his current medication.  He will go for the up-to-date lab work as indicated and we will follow-up with the results.  He will continue to work on improving his diet and exercise.  We will see him back as scheduled.  Orders:  •  CBC and differential; Future  •  Comprehensive metabolic panel; Future  •  LDL cholesterol, direct; Future  •  Lipid panel; Future  •  TSH, 3rd generation with Free T4 reflex; Future  •  UA (URINE) with reflex to Scope; Future  •  amLODIPine (NORVASC) 5 mg tablet; Take 1 tablet (5 mg total) by mouth daily  •  lisinopril (ZESTRIL) 40 mg tablet; Take 1 tablet (40 mg total) by mouth daily  •  rosuvastatin (CRESTOR) 10 MG tablet; Take 1 tablet (10 mg total) by mouth daily

## 2025-01-30 NOTE — ASSESSMENT & PLAN NOTE
Patient will continue the current dose of his rosuvastatin.  We have made no changes today. The patient was counseled on healthy lifestyle interventions for weight loss and improving his cholesterol.  Discussed implementing dietary changes (smaller portions, higher protein, 4-5 servings of fruits and vegetables a day) as well as increasing activity (strength training and 150 minutes weekly of moderate exercise).    Orders:  •  CBC and differential; Future  •  Comprehensive metabolic panel; Future  •  LDL cholesterol, direct; Future  •  Lipid panel; Future  •  TSH, 3rd generation with Free T4 reflex; Future  •  UA (URINE) with reflex to Scope; Future  •  rosuvastatin (CRESTOR) 10 MG tablet; Take 1 tablet (10 mg total) by mouth daily

## 2025-01-31 NOTE — ASSESSMENT & PLAN NOTE
Tobacco Cessation Counseling: Tobacco cessation counseling and education was provided. The patient is sincerely urged to quit consumption of tobacco. He is not ready to quit tobacco. The numerous health risks of tobacco consumption were discussed. If he decides to quit, there are a number of helpful adjunctive aids, and he can see me to discuss nicotine replacement therapy, chantix, or bupropion anytime in the future.. I spent 5 minutes on Tobacco Cessation counseling during today's visit.

## 2025-01-31 NOTE — ASSESSMENT & PLAN NOTE
Patient is currently stable and has no symptoms and will continue to follow-up with rheumatology as scheduled.  Orders:  •  CBC and differential; Future  •  Comprehensive metabolic panel; Future

## 2025-02-18 LAB — COLOGUARD RESULT REPORTABLE: POSITIVE

## 2025-02-19 ENCOUNTER — RESULTS FOLLOW-UP (OUTPATIENT)
Dept: FAMILY MEDICINE CLINIC | Facility: CLINIC | Age: 70
End: 2025-02-19

## 2025-02-20 DIAGNOSIS — R19.5 POSITIVE COLORECTAL CANCER SCREENING USING COLOGUARD TEST: Primary | ICD-10-CM

## 2025-02-20 NOTE — RESULT ENCOUNTER NOTE
Informed pt that he was positive for cologuard and he needs to have colonoscopy. I also told him about the refer and he said ok to that.

## 2025-02-28 ENCOUNTER — TELEPHONE (OUTPATIENT)
Age: 70
End: 2025-02-28

## 2025-02-28 ENCOUNTER — PREP FOR PROCEDURE (OUTPATIENT)
Age: 70
End: 2025-02-28

## 2025-02-28 DIAGNOSIS — R19.5 POSITIVE COLORECTAL CANCER SCREENING USING COLOGUARD TEST: Primary | ICD-10-CM

## 2025-02-28 NOTE — TELEPHONE ENCOUNTER
Patients GI provider:  Dr. Rosario    Number to return call: [phone]    Reason for call: Pt requesting Miralax/Ducolax prep instructions be mailed to his verified address at: [address] [place] [phone]. Thank you    Scheduled procedure/appointment date if applicable: Procedure 3/10/25

## 2025-02-28 NOTE — TELEPHONE ENCOUNTER
Scheduled date of colonoscopy (as of today): 3/10/25  Physician performing colonoscopy: Dr. Rosario  Location of colonoscopy: [facility]  Bowel prep reviewed with patient: everardo/bakari  Instructions reviewed with patient by: Sarina RIVERA, sent via mail per pt   Clearances: N/A      Since pt requesting for prep instructions to be mailed I informed him of the foods he must avoid 5 days leading up to colonoscopy. I informed pt to start avoiding them on 3/5/25. Pt understood all. This is in case he does not get instructions on time. I sent urgent message to the office.

## 2025-02-28 NOTE — TELEPHONE ENCOUNTER
02/28/25  Screened by: Sarina Braun MA    Referring Provider Dr. Marylou Fountain    Pre- Screening:     There is no height or weight on file to calculate BMI.  Has patient been referred for a routine screening Colonoscopy? yes  Is the patient between 45-75 years old? yes      Previous Colonoscopy yes   If yes:    Date: 10 yrs ago    Facility:     Reason:       Does the patient want to see a Gastroenterologist prior to their procedure OR are they having any GI symptoms? no    Has the patient been hospitalized or had abdominal surgery in the past 6 months? no    Does the patient use supplemental oxygen? no    Does the patient take Coumadin, Lovenox, Plavix, Elliquis, Xarelto, or other blood thinning medication? no    Has the patient had a stroke, cardiac event, or stent placed in the past year? no      If patient is between 45yrs - 49yrs, please advise patient that we will have to confirm benefits & coverage with their insurance company for a routine screening colonoscopy.

## 2025-03-10 ENCOUNTER — ANESTHESIA EVENT (OUTPATIENT)
Dept: GASTROENTEROLOGY | Facility: HOSPITAL | Age: 70
End: 2025-03-10
Payer: COMMERCIAL

## 2025-03-10 ENCOUNTER — ANESTHESIA (OUTPATIENT)
Dept: GASTROENTEROLOGY | Facility: HOSPITAL | Age: 70
End: 2025-03-10
Payer: COMMERCIAL

## 2025-03-10 ENCOUNTER — HOSPITAL ENCOUNTER (OUTPATIENT)
Dept: GASTROENTEROLOGY | Facility: HOSPITAL | Age: 70
Setting detail: OUTPATIENT SURGERY
Discharge: HOME | End: 2025-03-10
Attending: INTERNAL MEDICINE
Payer: COMMERCIAL

## 2025-03-10 VITALS
DIASTOLIC BLOOD PRESSURE: 59 MMHG | RESPIRATION RATE: 20 BRPM | TEMPERATURE: 97.6 F | SYSTOLIC BLOOD PRESSURE: 117 MMHG | HEART RATE: 65 BPM | OXYGEN SATURATION: 97 %

## 2025-03-10 DIAGNOSIS — R19.5 POSITIVE COLORECTAL CANCER SCREENING USING COLOGUARD TEST: ICD-10-CM

## 2025-03-10 PROBLEM — IMO0001 SMOKING: Status: ACTIVE | Noted: 2025-03-10

## 2025-03-10 PROBLEM — F17.200 SMOKING: Status: ACTIVE | Noted: 2025-03-10

## 2025-03-10 PROCEDURE — 88305 TISSUE EXAM BY PATHOLOGIST: CPT | Performed by: PATHOLOGY

## 2025-03-10 PROCEDURE — 45385 COLONOSCOPY W/LESION REMOVAL: CPT | Performed by: STUDENT IN AN ORGANIZED HEALTH CARE EDUCATION/TRAINING PROGRAM

## 2025-03-10 RX ORDER — SODIUM CHLORIDE 9 MG/ML
100 INJECTION, SOLUTION INTRAVENOUS CONTINUOUS
Status: CANCELLED | OUTPATIENT
Start: 2025-03-10

## 2025-03-10 RX ORDER — PHENYLEPHRINE HCL IN 0.9% NACL 1 MG/10 ML
SYRINGE (ML) INTRAVENOUS AS NEEDED
Status: DISCONTINUED | OUTPATIENT
Start: 2025-03-10 | End: 2025-03-10

## 2025-03-10 RX ORDER — SODIUM CHLORIDE 9 MG/ML
INJECTION, SOLUTION INTRAVENOUS CONTINUOUS PRN
Status: DISCONTINUED | OUTPATIENT
Start: 2025-03-10 | End: 2025-03-10

## 2025-03-10 RX ORDER — PROPOFOL 10 MG/ML
INJECTION, EMULSION INTRAVENOUS AS NEEDED
Status: DISCONTINUED | OUTPATIENT
Start: 2025-03-10 | End: 2025-03-10

## 2025-03-10 RX ADMIN — Medication 200 MCG: at 13:07

## 2025-03-10 RX ADMIN — SODIUM CHLORIDE: 0.9 INJECTION, SOLUTION INTRAVENOUS at 12:39

## 2025-03-10 RX ADMIN — PROPOFOL 50 MG: 10 INJECTION, EMULSION INTRAVENOUS at 12:49

## 2025-03-10 RX ADMIN — PROPOFOL 120 MG: 10 INJECTION, EMULSION INTRAVENOUS at 12:47

## 2025-03-10 RX ADMIN — Medication 200 MCG: at 12:51

## 2025-03-10 RX ADMIN — PROPOFOL 50 MG: 10 INJECTION, EMULSION INTRAVENOUS at 13:07

## 2025-03-10 RX ADMIN — PROPOFOL 30 MG: 10 INJECTION, EMULSION INTRAVENOUS at 12:55

## 2025-03-10 RX ADMIN — Medication 200 MCG: at 12:57

## 2025-03-10 RX ADMIN — PROPOFOL 50 MG: 10 INJECTION, EMULSION INTRAVENOUS at 13:03

## 2025-03-10 RX ADMIN — PROPOFOL 50 MG: 10 INJECTION, EMULSION INTRAVENOUS at 12:53

## 2025-03-10 NOTE — H&P
History and Physical -  [facility]  Bala Mejia 69 y.o. male MRN: [id number]    HPI: Bala Mejia is a 69 y.o. male who presents for colonoscopy    REVIEW OF SYSTEMS: Per the HPI, and otherwise unremarkable.    Historical Information   No past medical history on file.  No past surgical history on file.  Social History   Social History     Substance and Sexual Activity   Alcohol Use Yes    Comment: social     Social History     Substance and Sexual Activity   Drug Use Never     Social History     Tobacco Use   Smoking Status Every Day    Types: Cigars    Passive exposure: Past   Smokeless Tobacco Never   Tobacco Comments    5 cigars a day for 30 years.       Family History   Problem Relation Age of Onset    Hypertension Mother     Diabetes Father     Parkinsonism Father     Heart disease Sister         coronary heart disease(CHD)    Heart attack Sister 58        myocardial infarction       Meds/Allergies       Current Outpatient Medications:     amLODIPine (NORVASC) 5 mg tablet    lisinopril (ZESTRIL) 40 mg tablet    rosuvastatin (CRESTOR) 10 MG tablet    No Known Allergies    Objective     /81   Pulse 64   Temp 97.6 °F (36.4 °C) (Temporal)   Resp 16     PHYSICAL EXAM    General Appearance: NAD, cooperative, alert  Eyes: Anicteric  GI:  Soft, non-tender, non-distended; normal bowel sounds; no masses, no organomegaly   Rectal: Deferred until procedure  Musculoskeletal: No edema.  Skin:  No jaundice    ASSESSMENT/PLAN:  This is a 69 y.o. male here for colonoscopy, and he is stable and optimized for his procedure.

## 2025-03-10 NOTE — ANESTHESIA POSTPROCEDURE EVALUATION
Post-Op Assessment Note    CV Status:  Stable  Pain Score: 0    Pain management: adequate       Mental Status:  Alert and awake   Hydration Status:  Euvolemic   PONV Controlled:  Controlled   Airway Patency:  Patent     Post Op Vitals Reviewed: Yes    No anethesia notable event occurred.    Staff: Anesthesiologist, CRNA           Last Filed PACU Vitals:  Vitals Value Taken Time   Temp     Pulse 60    BP 96/56    Resp 18    SpO2 98

## 2025-03-10 NOTE — ANESTHESIA PREPROCEDURE EVALUATION
Procedure:  COLONOSCOPY - pos cologuard    Relevant Problems   ANESTHESIA (within normal limits)      CARDIO   (+) Essential hypertension   (+) Hyperlipidemia, unspecified      /RENAL   (+) Benign localized hyperplasia of prostate      PULMONARY  Snores, no known BLESSING   (+) Smoking (5 cigars/day)   (-) URI (upper respiratory infection)      Rheumatology   (+) PMR (polymyalgia rheumatica) (HCC)    BMI 32    Physical Exam    Airway  Comment: Large neck circumference  Mallampati score: III         Dental   Comment: Denies loose, lower front teeth discolored     Cardiovascular      Pulmonary      Other Findings      Lab Results   Component Value Date    WBC 7.8 07/17/2023    HGB 14.1 07/17/2023     07/17/2023     Lab Results   Component Value Date    SODIUM 138 07/17/2023    K 4.8 07/17/2023    BUN 14 07/17/2023    CREATININE 0.95 07/17/2023    EGFR 87 07/17/2023    GLUCOSE 104 (H) 11/06/2017     Anesthesia Plan  ASA Score- 2     Anesthesia Type- IV sedation with anesthesia with ASA Monitors.         Additional Monitors:     Airway Plan:            Plan Factors-Exercise tolerance (METS): >4 METS.    Chart reviewed.   Existing labs reviewed. Patient summary reviewed.    Patient is a current smoker.              Induction- intravenous.    Postoperative Plan-         Informed Consent- Anesthetic plan and risks discussed with patient.  I personally reviewed this patient with the CRNA. Discussed and agreed on the Anesthesia Plan with the CRNA..      NPO Status:  No vitals data found for the desired time range.

## 2025-03-14 PROCEDURE — 88305 TISSUE EXAM BY PATHOLOGIST: CPT | Performed by: PATHOLOGY

## 2025-03-15 ENCOUNTER — RESULTS FOLLOW-UP (OUTPATIENT)
Dept: GASTROENTEROLOGY | Facility: CLINIC | Age: 70
End: 2025-03-15

## 2025-03-17 ENCOUNTER — TELEPHONE (OUTPATIENT)
Age: 70
End: 2025-03-17

## 2025-03-17 NOTE — TELEPHONE ENCOUNTER
Pt called in to discuss his results. Relayed message per Dr. Rosario.  No further questions.   Jere Rosario, DO to Bala Mejia Regarding result: Tissue Exam         3/15/25  2:35 PM  Norman Mckenna,     The polyps we removed from your recent colonoscopy were analyzed.  These were benign tubular adenomas which are precancerous.  However there are no actual cancerous cells.  We will plan for repeat colonoscopy in 5 years.  If you have any questions or concerns please call or message anytime.  Thanks and please take care.

## 2025-04-25 ENCOUNTER — VBI (OUTPATIENT)
Dept: ADMINISTRATIVE | Facility: OTHER | Age: 70
End: 2025-04-25

## 2025-04-25 NOTE — TELEPHONE ENCOUNTER
04/25/25 9:58 AM     Chart reviewed for CRC: Colonoscopy ; nothing is submitted to the patient's insurance at this time.     Franck Hernandez MA   [facility]

## 2025-05-19 ENCOUNTER — OFFICE VISIT (OUTPATIENT)
Dept: URGENT CARE | Facility: CLINIC | Age: 70
End: 2025-05-19
Payer: COMMERCIAL

## 2025-05-19 VITALS
RESPIRATION RATE: 16 BRPM | WEIGHT: 200 LBS | SYSTOLIC BLOOD PRESSURE: 120 MMHG | DIASTOLIC BLOOD PRESSURE: 70 MMHG | HEIGHT: 66 IN | TEMPERATURE: 98.7 F | BODY MASS INDEX: 32.14 KG/M2 | HEART RATE: 69 BPM | OXYGEN SATURATION: 96 %

## 2025-05-19 DIAGNOSIS — R10.9 ABDOMINAL PAIN, UNSPECIFIED ABDOMINAL LOCATION: Primary | ICD-10-CM

## 2025-05-19 LAB
SL AMB  POCT GLUCOSE, UA: NEGATIVE
SL AMB LEUKOCYTE ESTERASE,UA: NEGATIVE
SL AMB POCT BILIRUBIN,UA: 6.5
SL AMB POCT BLOOD,UA: NEGATIVE
SL AMB POCT CLARITY,UA: CLEAR
SL AMB POCT COLOR,UA: YELLOW
SL AMB POCT KETONES,UA: NEGATIVE
SL AMB POCT NITRITE,UA: NEGATIVE
SL AMB POCT PH,UA: 6.5
SL AMB POCT SPECIFIC GRAVITY,UA: 1.01
SL AMB POCT URINE PROTEIN: NEGATIVE
SL AMB POCT UROBILINOGEN: 0.2

## 2025-05-19 PROCEDURE — 81002 URINALYSIS NONAUTO W/O SCOPE: CPT

## 2025-05-19 PROCEDURE — 99213 OFFICE O/P EST LOW 20 MIN: CPT

## 2025-05-19 NOTE — PROGRESS NOTES
"[facility]        NAME: Bala Mejia is a 69 y.o. male  : 1955    MRN: [id number]  DATE: May 19, 2025  TIME: 2:10 PM    /70 (BP Location: Right arm, Patient Position: Sitting, Cuff Size: Standard)   Pulse 69   Temp 98.7 °F (37.1 °C) (Tympanic)   Resp 16   Ht 5' 6\" (1.676 m)   Wt 90.7 kg (200 lb)   SpO2 96%   BMI 32.28 kg/m²     Assessment and Plan   Abdominal pain, unspecified abdominal location [R10.9]  1. Abdominal pain, unspecified abdominal location  POCT urine dip            Patient Instructions       Follow up with PCP in 3-5 days.  Proceed to  ER if symptoms worsen.    Chief Complaint     Chief Complaint   Patient presents with    Abdominal Pain     Pt reports right upper abdominal pain with onset yesterday after reaching for a blanket from a supine position. Reports feeling a \"lump\" at time of occurrence. Denies any pain today.          History of Present Illness       Pt with ruq pan last cat none now in office,no sweating no nausea ,pt states he felt a lump pt states pain was for several minutes     Abdominal Pain        Review of Systems   Review of Systems   Constitutional: Negative.    HENT: Negative.     Eyes: Negative.    Respiratory: Negative.     Cardiovascular: Negative.    Gastrointestinal:  Positive for abdominal pain.   Endocrine: Negative.    Genitourinary: Negative.    Musculoskeletal: Negative.    Skin: Negative.    Allergic/Immunologic: Negative.    Neurological: Negative.    Hematological: Negative.    Psychiatric/Behavioral: Negative.     All other systems reviewed and are negative.        Current Medications     Current Medications[1]    Current Allergies     Allergies as of 2025    (No Known Allergies)            The following portions of the patient's history were reviewed and updated as appropriate: allergies, current medications, past family history, past medical history, past social history, past surgical history and problem list.     No past " "medical history on file.    No past surgical history on file.    Family History   Problem Relation Age of Onset    Hypertension Mother     Diabetes Father     Parkinsonism Father     Heart disease Sister         coronary heart disease(CHD)    Heart attack Sister 58        myocardial infarction         Medications have been verified.        Objective   /70 (BP Location: Right arm, Patient Position: Sitting, Cuff Size: Standard)   Pulse 69   Temp 98.7 °F (37.1 °C) (Tympanic)   Resp 16   Ht 5' 6\" (1.676 m)   Wt 90.7 kg (200 lb)   SpO2 96%   BMI 32.28 kg/m²        Physical Exam     Physical Exam  Vitals and nursing note reviewed.   Constitutional:       Appearance: He is well-developed and normal weight.      Comments: No abdomen pain in office  at all    HENT:      Head: Normocephalic and atraumatic.      Mouth/Throat:      Mouth: Mucous membranes are moist.     Eyes:      Extraocular Movements: Extraocular movements intact.       Cardiovascular:      Rate and Rhythm: Normal rate.      Heart sounds: Normal heart sounds.   Pulmonary:      Effort: Pulmonary effort is normal.      Breath sounds: Normal breath sounds.   Abdominal:      General: Bowel sounds are normal.      Palpations: Abdomen is soft.      Comments: No pain to palpation ruq rlq mid epigastic luq llq      Neurological:      Mental Status: He is alert.                          [1]   Current Outpatient Medications:     amLODIPine (NORVASC) 5 mg tablet, Take 1 tablet (5 mg total) by mouth daily, Disp: 90 tablet, Rfl: 1    lisinopril (ZESTRIL) 40 mg tablet, Take 1 tablet (40 mg total) by mouth daily, Disp: 90 tablet, Rfl: 1    rosuvastatin (CRESTOR) 10 MG tablet, Take 1 tablet (10 mg total) by mouth daily, Disp: 90 tablet, Rfl: 1    "

## 2025-06-04 DIAGNOSIS — I10 ESSENTIAL HYPERTENSION: ICD-10-CM

## 2025-06-05 RX ORDER — AMLODIPINE BESYLATE 5 MG/1
5 TABLET ORAL DAILY
Qty: 90 TABLET | Refills: 1 | Status: SHIPPED | OUTPATIENT
Start: 2025-06-05

## 2025-06-23 ENCOUNTER — OFFICE VISIT (OUTPATIENT)
Dept: URGENT CARE | Facility: CLINIC | Age: 70
End: 2025-06-23
Payer: COMMERCIAL

## 2025-06-23 VITALS
HEART RATE: 75 BPM | TEMPERATURE: 98.9 F | BODY MASS INDEX: 32.14 KG/M2 | HEIGHT: 66 IN | RESPIRATION RATE: 16 BRPM | OXYGEN SATURATION: 96 % | SYSTOLIC BLOOD PRESSURE: 124 MMHG | WEIGHT: 200 LBS | DIASTOLIC BLOOD PRESSURE: 72 MMHG

## 2025-06-23 DIAGNOSIS — J40 BRONCHITIS: ICD-10-CM

## 2025-06-23 DIAGNOSIS — J01.00 ACUTE MAXILLARY SINUSITIS, RECURRENCE NOT SPECIFIED: Primary | ICD-10-CM

## 2025-06-23 PROCEDURE — 99213 OFFICE O/P EST LOW 20 MIN: CPT | Performed by: PHYSICIAN ASSISTANT

## 2025-06-23 RX ORDER — BENZONATATE 100 MG/1
100 CAPSULE ORAL 3 TIMES DAILY PRN
Qty: 20 CAPSULE | Refills: 0 | Status: SHIPPED | OUTPATIENT
Start: 2025-06-23

## 2025-06-23 RX ORDER — DOXYCYCLINE 100 MG/1
100 TABLET ORAL 2 TIMES DAILY
Qty: 20 TABLET | Refills: 0 | Status: SHIPPED | OUTPATIENT
Start: 2025-06-23 | End: 2025-07-03

## 2025-06-23 NOTE — PROGRESS NOTES
"[facility]        NAME: Bala Mejia is a 69 y.o. male  : 1955    MRN: [id number]  DATE: 2025  TIME: 8:33 AM    /72 (BP Location: Right arm, Patient Position: Sitting, Cuff Size: Standard)   Pulse 75   Temp 98.9 °F (37.2 °C) (Tympanic)   Resp 16   Ht 5' 6\" (1.676 m)   Wt 90.7 kg (200 lb)   SpO2 96%   BMI 32.28 kg/m²     Assessment and Plan   Acute maxillary sinusitis, recurrence not specified [J01.00]  1. Acute maxillary sinusitis, recurrence not specified  benzonatate (TESSALON PERLES) 100 mg capsule    doxycycline (ADOXA) 100 MG tablet      2. Bronchitis  benzonatate (TESSALON PERLES) 100 mg capsule    doxycycline (ADOXA) 100 MG tablet            Patient Instructions       Follow up with PCP in 3-5 days.  Proceed to  ER if symptoms worsen.    Chief Complaint     Chief Complaint   Patient presents with    Cough     Pt reports non-productive cough with onset two weeks ago. Denies any other symptoms. Managing with NyQuil.         History of Present Illness       Pt with 2 weeiks of slight productive cough and congestion       Cough        Review of Systems   Review of Systems   Constitutional: Negative.    HENT:  Positive for congestion.    Eyes: Negative.    Respiratory:  Positive for cough.    Cardiovascular: Negative.    Gastrointestinal: Negative.    Endocrine: Negative.    Genitourinary: Negative.    Musculoskeletal: Negative.    Skin: Negative.    Allergic/Immunologic: Negative.    Neurological: Negative.    Hematological: Negative.    Psychiatric/Behavioral: Negative.     All other systems reviewed and are negative.        Current Medications     Current Medications[1]    Current Allergies     Allergies as of 2025    (No Known Allergies)            The following portions of the patient's history were reviewed and updated as appropriate: allergies, current medications, past family history, past medical history, past social history, past surgical history and problem " "list.     Past Medical History[2]    Past Surgical History[3]    Family History[4]      Medications have been verified.        Objective   /72 (BP Location: Right arm, Patient Position: Sitting, Cuff Size: Standard)   Pulse 75   Temp 98.9 °F (37.2 °C) (Tympanic)   Resp 16   Ht 5' 6\" (1.676 m)   Wt 90.7 kg (200 lb)   SpO2 96%   BMI 32.28 kg/m²        Physical Exam     Physical Exam  Vitals and nursing note reviewed.   Constitutional:       Appearance: Normal appearance. He is normal weight.   HENT:      Head: Normocephalic and atraumatic.      Nose: Rhinorrhea present.     Cardiovascular:      Rate and Rhythm: Normal rate and regular rhythm.      Pulses: Normal pulses.      Heart sounds: Normal heart sounds.   Pulmonary:      Effort: Pulmonary effort is normal.      Comments: Right sided minor coarse sounds cleared with cough   Abdominal:      Palpations: Abdomen is soft.     Musculoskeletal:         General: Normal range of motion.      Cervical back: Normal range of motion and neck supple.     Skin:     General: Skin is warm.      Capillary Refill: Capillary refill takes less than 2 seconds.     Neurological:      Mental Status: He is alert and oriented to person, place, and time.                          [1]   Current Outpatient Medications:     amLODIPine (NORVASC) 5 mg tablet, TAKE 1 TABLET (5 MG TOTAL) BY MOUTH DAILY., Disp: 90 tablet, Rfl: 1    benzonatate (TESSALON PERLES) 100 mg capsule, Take 1 capsule (100 mg total) by mouth 3 (three) times a day as needed for cough, Disp: 20 capsule, Rfl: 0    doxycycline (ADOXA) 100 MG tablet, Take 1 tablet (100 mg total) by mouth 2 (two) times a day for 10 days, Disp: 20 tablet, Rfl: 0    lisinopril (ZESTRIL) 40 mg tablet, Take 1 tablet (40 mg total) by mouth daily, Disp: 90 tablet, Rfl: 1    rosuvastatin (CRESTOR) 10 MG tablet, Take 1 tablet (10 mg total) by mouth daily, Disp: 90 tablet, Rfl: 1  [2] No past medical history on file.  [3] No past surgical " history on file.  [4]   Family History  Problem Relation Name Age of Onset    Hypertension Mother      Diabetes Father      Parkinsonism Father      Heart disease Sister          coronary heart disease(CHD)    Heart attack Sister  58        myocardial infarction

## 2025-07-08 LAB
ALBUMIN SERPL-MCNC: 4.4 G/DL (ref 3.8–4.8)
ALP SERPL-CCNC: 50 IU/L (ref 44–121)
ALT SERPL-CCNC: 22 IU/L (ref 0–44)
APPEARANCE UR: CLEAR
AST SERPL-CCNC: 20 IU/L (ref 0–40)
BASOPHILS # BLD AUTO: 0.1 X10E3/UL (ref 0–0.2)
BASOPHILS NFR BLD AUTO: 1 %
BILIRUB SERPL-MCNC: 0.4 MG/DL (ref 0–1.2)
BILIRUB UR QL STRIP: NEGATIVE
BUN SERPL-MCNC: 13 MG/DL (ref 8–27)
BUN/CREAT SERPL: 14 (ref 10–24)
CALCIUM SERPL-MCNC: 9.2 MG/DL (ref 8.6–10.2)
CHLORIDE SERPL-SCNC: 103 MMOL/L (ref 96–106)
CHOLEST SERPL-MCNC: 141 MG/DL (ref 100–199)
CHOLEST/HDLC SERPL: 4.9 RATIO (ref 0–5)
CO2 SERPL-SCNC: 22 MMOL/L (ref 20–29)
COLOR UR: YELLOW
CREAT SERPL-MCNC: 0.92 MG/DL (ref 0.76–1.27)
EGFR: 89 ML/MIN/1.73
EOSINOPHIL # BLD AUTO: 0.1 X10E3/UL (ref 0–0.4)
EOSINOPHIL NFR BLD AUTO: 2 %
ERYTHROCYTE [DISTWIDTH] IN BLOOD BY AUTOMATED COUNT: 13.1 % (ref 11.6–15.4)
GLOBULIN SER-MCNC: 2.2 G/DL (ref 1.5–4.5)
GLUCOSE SERPL-MCNC: 96 MG/DL (ref 70–99)
GLUCOSE UR QL: NEGATIVE
HCT VFR BLD AUTO: 44.1 % (ref 37.5–51)
HDLC SERPL-MCNC: 29 MG/DL
HGB BLD-MCNC: 14.3 G/DL (ref 13–17.7)
HGB UR QL STRIP: NEGATIVE
IMM GRANULOCYTES # BLD: 0 X10E3/UL (ref 0–0.1)
IMM GRANULOCYTES NFR BLD: 0 %
KETONES UR QL STRIP: NEGATIVE
LDL CALC COMMENT: ABNORMAL
LDLC SERPL CALC-MCNC: 84 MG/DL (ref 0–99)
LDLC SERPL DIRECT ASSAY-MCNC: 82 MG/DL (ref 0–99)
LEUKOCYTE ESTERASE UR QL STRIP: NEGATIVE
LYMPHOCYTES # BLD AUTO: 2.4 X10E3/UL (ref 0.7–3.1)
LYMPHOCYTES NFR BLD AUTO: 36 %
MCH RBC QN AUTO: 30.3 PG (ref 26.6–33)
MCHC RBC AUTO-ENTMCNC: 32.4 G/DL (ref 31.5–35.7)
MCV RBC AUTO: 93 FL (ref 79–97)
MICRO URNS: NORMAL
MONOCYTES # BLD AUTO: 0.8 X10E3/UL (ref 0.1–0.9)
MONOCYTES NFR BLD AUTO: 12 %
NEUTROPHILS # BLD AUTO: 3.3 X10E3/UL (ref 1.4–7)
NEUTROPHILS NFR BLD AUTO: 49 %
NITRITE UR QL STRIP: NEGATIVE
PH UR STRIP: 6 [PH] (ref 5–7.5)
PLATELET # BLD AUTO: 278 X10E3/UL (ref 150–450)
POTASSIUM SERPL-SCNC: 5 MMOL/L (ref 3.5–5.2)
PROT SERPL-MCNC: 6.6 G/DL (ref 6–8.5)
PROT UR QL STRIP: NEGATIVE
RBC # BLD AUTO: 4.72 X10E6/UL (ref 4.14–5.8)
SL AMB VLDL CHOLESTEROL CALC: 28 MG/DL (ref 5–40)
SODIUM SERPL-SCNC: 139 MMOL/L (ref 134–144)
SP GR UR: 1.01 (ref 1–1.03)
TRIGL SERPL-MCNC: 160 MG/DL (ref 0–149)
TSH SERPL DL<=0.005 MIU/L-ACNC: 1.5 UIU/ML (ref 0.45–4.5)
UROBILINOGEN UR STRIP-ACNC: 0.2 MG/DL (ref 0.2–1)
WBC # BLD AUTO: 6.7 X10E3/UL (ref 3.4–10.8)

## 2025-07-30 ENCOUNTER — OFFICE VISIT (OUTPATIENT)
Dept: FAMILY MEDICINE CLINIC | Facility: CLINIC | Age: 70
End: 2025-07-30
Payer: COMMERCIAL

## 2025-07-30 VITALS
HEART RATE: 78 BPM | WEIGHT: 197.4 LBS | OXYGEN SATURATION: 97 % | SYSTOLIC BLOOD PRESSURE: 122 MMHG | HEIGHT: 66 IN | TEMPERATURE: 98.8 F | BODY MASS INDEX: 31.72 KG/M2 | DIASTOLIC BLOOD PRESSURE: 74 MMHG | RESPIRATION RATE: 16 BRPM

## 2025-07-30 DIAGNOSIS — Z23 ENCOUNTER FOR IMMUNIZATION: ICD-10-CM

## 2025-07-30 DIAGNOSIS — Z00.00 MEDICARE ANNUAL WELLNESS VISIT, SUBSEQUENT: Primary | ICD-10-CM

## 2025-07-30 PROCEDURE — 90677 PCV20 VACCINE IM: CPT

## 2025-07-30 PROCEDURE — G0439 PPPS, SUBSEQ VISIT: HCPCS

## 2025-07-30 PROCEDURE — G0009 ADMIN PNEUMOCOCCAL VACCINE: HCPCS
